# Patient Record
Sex: MALE | Race: WHITE
[De-identification: names, ages, dates, MRNs, and addresses within clinical notes are randomized per-mention and may not be internally consistent; named-entity substitution may affect disease eponyms.]

---

## 2019-12-05 ENCOUNTER — HOSPITAL ENCOUNTER (INPATIENT)
Dept: HOSPITAL 74 - JER | Age: 69
LOS: 1 days | Discharge: LEFT BEFORE DISCHARGE | DRG: 194 | End: 2019-12-06
Attending: NURSE PRACTITIONER | Admitting: INTERNAL MEDICINE
Payer: COMMERCIAL

## 2019-12-05 VITALS — BODY MASS INDEX: 18.2 KG/M2

## 2019-12-05 VITALS — TEMPERATURE: 98 F

## 2019-12-05 DIAGNOSIS — R64: ICD-10-CM

## 2019-12-05 DIAGNOSIS — A15.9: ICD-10-CM

## 2019-12-05 DIAGNOSIS — J18.9: Primary | ICD-10-CM

## 2019-12-05 DIAGNOSIS — R62.7: ICD-10-CM

## 2019-12-05 DIAGNOSIS — J44.9: ICD-10-CM

## 2019-12-05 DIAGNOSIS — E44.0: ICD-10-CM

## 2019-12-05 DIAGNOSIS — F17.210: ICD-10-CM

## 2019-12-05 LAB
ALBUMIN SERPL-MCNC: 3.1 G/DL (ref 3.4–5)
ALP SERPL-CCNC: 84 U/L (ref 45–117)
ALT SERPL-CCNC: 17 U/L (ref 13–61)
AMPHET UR-MCNC: NEGATIVE NG/ML
ANION GAP SERPL CALC-SCNC: 8 MMOL/L (ref 8–16)
APPEARANCE UR: CLEAR
AST SERPL-CCNC: 15 U/L (ref 15–37)
BACTERIA # UR AUTO: 1 /HPF
BARBITURATES UR-MCNC: NEGATIVE NG/ML
BASOPHILS # BLD: 0.9 % (ref 0–2)
BENZODIAZ UR SCN-MCNC: NEGATIVE NG/ML
BILIRUB SERPL-MCNC: 0.2 MG/DL (ref 0.2–1)
BILIRUB UR STRIP.AUTO-MCNC: NEGATIVE MG/DL
BUN SERPL-MCNC: 12.4 MG/DL (ref 7–18)
CALCIUM SERPL-MCNC: 8.5 MG/DL (ref 8.5–10.1)
CASTS URNS QL MICRO: 1 /LPF (ref 0–8)
CHLORIDE SERPL-SCNC: 103 MMOL/L (ref 98–107)
CO2 SERPL-SCNC: 25 MMOL/L (ref 21–32)
COCAINE UR-MCNC: NEGATIVE NG/ML
COLOR UR: YELLOW
CREAT SERPL-MCNC: 0.6 MG/DL (ref 0.55–1.3)
DEPRECATED RDW RBC AUTO: 13.1 % (ref 11.9–15.9)
EOSINOPHIL # BLD: 2.4 % (ref 0–4.5)
EPITH CASTS URNS QL MICRO: 0.3 /HPF
GLUCOSE SERPL-MCNC: 152 MG/DL (ref 74–106)
HCT VFR BLD CALC: 35.2 % (ref 35.4–49)
HGB BLD-MCNC: 11.9 GM/DL (ref 11.7–16.9)
KETONES UR QL STRIP: NEGATIVE
LEUKOCYTE ESTERASE UR QL STRIP.AUTO: NEGATIVE
LYMPHOCYTES # BLD: 14.6 % (ref 8–40)
MCH RBC QN AUTO: 31.8 PG (ref 25.7–33.7)
MCHC RBC AUTO-ENTMCNC: 33.9 G/DL (ref 32–35.9)
MCV RBC: 93.7 FL (ref 80–96)
METHADONE UR-MCNC: NEGATIVE NG/ML
MONOCYTES # BLD AUTO: 6.8 % (ref 3.8–10.2)
NEUTROPHILS # BLD: 75.3 % (ref 42.8–82.8)
NITRITE UR QL STRIP: NEGATIVE
OPIATES UR QL SCN: NEGATIVE NG/ML
PCP UR QL SCN: NEGATIVE NG/ML
PH UR: 5.5 [PH] (ref 5–8)
PLATELET # BLD AUTO: 253 K/MM3 (ref 134–434)
PMV BLD: 7.8 FL (ref 7.5–11.1)
POTASSIUM SERPLBLD-SCNC: 3.8 MMOL/L (ref 3.5–5.1)
PROT SERPL-MCNC: 7.3 G/DL (ref 6.4–8.2)
PROT UR QL STRIP: (no result)
PROT UR QL STRIP: NEGATIVE
RBC # BLD AUTO: 1 /HPF (ref 0–4)
RBC # BLD AUTO: 3.76 M/MM3 (ref 4–5.6)
SODIUM SERPL-SCNC: 136 MMOL/L (ref 136–145)
SP GR UR: 1.01 (ref 1.01–1.03)
UROBILINOGEN UR STRIP-MCNC: 0.2 MG/DL (ref 0.2–1)
WBC # BLD AUTO: 8.5 K/MM3 (ref 4–10)
WBC # UR AUTO: 0 /HPF (ref 0–5)

## 2019-12-05 RX ADMIN — IPRATROPIUM BROMIDE AND ALBUTEROL SULFATE SCH AMP: .5; 3 SOLUTION RESPIRATORY (INHALATION) at 16:30

## 2019-12-05 RX ADMIN — IPRATROPIUM BROMIDE AND ALBUTEROL SULFATE SCH AMP: .5; 3 SOLUTION RESPIRATORY (INHALATION) at 12:13

## 2019-12-05 RX ADMIN — IPRATROPIUM BROMIDE AND ALBUTEROL SULFATE SCH AMP: .5; 3 SOLUTION RESPIRATORY (INHALATION) at 20:28

## 2019-12-05 NOTE — PDOC
History of Present Illness





- General


Chief Complaint: Chest Pain


Stated Complaint: CHEST PAIN


Time Seen by Provider: 12/05/19 05:24





- History of Present Illness


Initial Comments: 


12/05/19 05:42


69 yo M who does not see doctors, p/w chest pain. Patient reports that he was 

at a restaurant eating this morning when he experienced L sided chest pain, 

associated with SOB and nausea w/o vomiting. Someone at the restaurant called 

the ambulance for him. Patient says that he has had this pain intermittently 

for weeks. Reports that the pain is not currently there.





Reports that he drinks approximately 3 beers daily, but "I drink more in the 

summer months". Further states "I'm not an alcoholic, I can stop anytime I want

". About 1ppd smoker since 15 years old.





States that in the ambulance, he saw "beautiful children, they were so beautiful

".





Currently complains that his back hurts and his legs are tingling.








Past History





- Past Medical History


Allergies/Adverse Reactions: 


 Allergies











Allergy/AdvReac Type Severity Reaction Status Date / Time


 


No Known Allergies Allergy   Verified 12/05/19 05:20











COPD: No


HTN: No (hypotension)





- Psycho Social/Smoking Cessation Hx


Smoking History: Unknown if ever smoked





**Review of Systems





- Review of Systems


Comments:: 


12/05/19 06:15


GENERAL/CONSTITUTIONAL: No fever or chills. No weakness.


HEAD, EYES, EARS, NOSE AND THROAT: No change in vision. No ear pain or 

discharge. No sore throat.


CARDIOVASCULAR: Chest pain w/ shortness of breath, resolved.


RESPIRATORY: Cough. No wheezing, or hemoptysis.


GASTROINTESTINAL: No nausea, vomiting, diarrhea or constipation.


GENITOURINARY: No dysuria, frequency, or change in urination.


MUSCULOSKELETAL: No joint or muscle swelling or pain. No neck or back pain.


SKIN: No rash


NEUROLOGIC: No headache, vertigo, loss of consciousness, or change in strength/

sensation.


ENDOCRINE: No increased thirst. No abnormal weight change.


HEMATOLOGIC/LYMPHATIC: No anemia, easy bleeding, or history of blood clots.


ALLERGIC/IMMUNOLOGIC: No hives or skin allergy








*Physical Exam





- Vital Signs


 Last Vital Signs











Temp Pulse Resp BP Pulse Ox


 


 98.5 F   81   94 H  101/62   99 


 


 12/05/19 05:20  12/05/19 05:20  12/05/19 05:20  12/05/19 05:20  12/05/19 05:20














- Physical Exam


12/05/19 06:14


Gen: unkempt, malodorous, undernourished, appears clinically intoxicated


Neuro: AAOX4, CN II-XII intact, FTN intact, EOMI, PERRLA, 5/5 strength, SILT


HEENT: atraumatic, normocephalic, dry mucous membranes


Neck: trachea midline, supple


CV: regular rate, regular rhythm, no murmurs, rubs, or gallops


Pulm: CTA b/l, no wheezing


Abd: soft, non-distended, non-tender


MSK: full ROM, intact pulses 


Extr: no edema, no deformities


Skin: warm, dry








ED Treatment Course





- LABORATORY


CBC & Chemistry Diagram: 


 12/05/19 05:45





 12/05/19 05:45





- RADIOLOGY


Radiology Studies Ordered: 














 Category Date Time Status


 


 CXRPORT [CHEST X-RAY PORTABLE*] [RAD] Stat Radiology  12/05/19 05:36 Ordered














Medical Decision Making





- Medical Decision Making


12/05/19 05:41


69 yo M who does not see doctors, drinks 3 beers daily, smoker, p/w CP.


- CBC, CMP


- EKG, trop


- CXR


- u tox, UA/UC


- reassess





PE low risk considering not tachycardic, not hypoxemic, no SOB.





12/05/19 06:30


EKG normal sinus at 91 bpm, left anterior fascicular block. Do not have old EKG

, but concern for ischemic change.





12/05/19 07:09


Patient signed out to Dr. SHELTON.








Discharge





- Discharge Information


Problems reviewed: Yes


Clinical Impression/Diagnosis: 


 Failure to thrive, COPD suggested by initial evaluation








- Follow up/Referral





- Patient Discharge Instructions





- Post Discharge Activity

## 2019-12-05 NOTE — PDOC
Attending Attestation





- Resident


Resident Name: Lisette Torres





- ED Attending Attestation


I have performed the following: I have examined & evaluated the patient, The 

case was reviewed & discussed with the resident, I agree w/resident's findings 

& plan





- HPI


HPI: 





12/05/19 06:19


see resident hpi





- Physicial Exam


PE: 





12/05/19 06:19


agree with resident exam





- Medical Decision Making





12/05/19 06:19


68-year-old male who does not pursue outpatient primary medical care with left-

sided chest pain after eating


Chest x-ray, EKG, labs


We will plan to admit to medical service for at minimum telemetry observation

## 2019-12-05 NOTE — CON.PULM
Consult


Consult Specialty:: PULMONARY


Referred by:: DOUGIE Laguerre


Reason for Consultation:: pneumonia





- History of Present Illness


Chief Complaint: chest pain


History of Present Illness: 





69yo male who denies past medical history who presents to the ED for abdominal 

pain per chart but tells me chest pain. Found to have a RUL infiltrate on CXR 

and CT chest. He denies any fevers, chills or sweats but reports a 60lb weight 

loss over the past 10 years. +cough with white sputum. Denies history of TB, 

states he has been tested for TB and HIV both of which were negative but unable 

to tell me when. Denies smoking. Born in [place].








- History Source


History Provided By: Patient, Medical Record


Limitations to Obtaining History: Poor Historian





- Alcohol/Substance Use


Hx Alcohol Use: Yes (denies to the writer)


Number of Drinks Daily: 3


Date of Last Use: 12/04/19





- Smoking History


Smoking history: Current every day smoker (1 ppd as per report to ED provider , 

denies to this writer)


Aproximately how many cigarettes per day: 20





- Social History


ADL: Independent


Occupation: unemployed


History of Recent Travel: No





Home Medications





- Allergies


Allergies/Adverse Reactions: 


 Allergies











Allergy/AdvReac Type Severity Reaction Status Date / Time


 


No Known Allergies Allergy   Verified 12/05/19 05:20














Review of Systems





- Review of Systems


Constitutional: reports: Unintentional Wgt. Loss.  denies: Chills, Fever


Eyes: denies: Recent Change in Vision


HENT: denies: Nasal Congestion, Throat Pain


Neck: denies: Stiffness, Tenderness


Cardiovascular: reports: Chest Pain.  denies: Palpitations, Shortness of Breath


Respiratory: reports: Cough.  denies: Hemoptysis, Wheezing


Gastrointestinal: denies: Abdominal Pain, Nausea, Vomiting


Genitourinary: denies: Dysuria, Hematuria


Neurological: denies: Dizziness, Headache


Endocrine: denies: Unexplained Weight Loss





Physical Exam


Vital Sings: 


 Vital Signs











Temperature  98.0 F   12/05/19 12:04


 


Pulse Rate  92 H  12/05/19 12:04


 


Respiratory Rate  16   12/05/19 12:04


 


Blood Pressure  104/58 L  12/05/19 12:04


 


O2 Sat by Pulse Oximetry (%)  97   12/05/19 07:15











Constitutional: Yes: Cachectic


Eyes: Yes: Conjunctiva Clear, EOM Intact


HENT: Yes: Atraumatic, Normocephalic


Neck: Yes: Supple, Trachea Midline


Cardiovascular: Yes: Regular Rate and Rhythm


Respiratory: Yes: Diminished (distant breath sounds)


...Clubbing: No


Gastrointestinal: Yes: Normal Bowel Sounds, Soft.  No: Tenderness


Edema: No


Neurological: Yes: Alert, Oriented


Labs: 


 CBC, BMP





 12/05/19 05:45 





 12/05/19 05:45 











Imaging





- Results


Chest X-ray: Report Reviewed, Image Reviewed


Cat Scan: Report Reviewed, Image Reviewed (RUL consolidation, RML infiltrates)





Assessment/Plan





Pneumonia


Failure to Thrive





-  collect sputum samples for AFB x 3


-  quantifieron


-  agree with empiric antibiotics but hold off on macrolides for now


-  may need bronchoscopy/BAL if sputum studies unrevealing


-  DVT prophylaxis





Thank you for this consult


Abraham Garcia MD

## 2019-12-05 NOTE — PDOC
*Physical Exam





- Vital Signs


 Last Vital Signs











Temp Pulse Resp BP Pulse Ox


 


 98.5 F   81   94 H  101/62   99 


 


 19 05:20  19 05:20  19 05:20  19 05:20  19 05:20














- Physical Exam


General Appearance: Yes: Nourished, Appropriately Dressed, Disheveled, Other (

strong scent of tobacco).  No: Alcohol on Breath


HEENT: positive: EOMI, DOC, Normal Voice, Symmetrical.  negative: Scleral 

Icterus (R), Scleral Icterus (L)


Neck: positive: Supple.  negative: Tender, Lymphadenopathy (R), Lymphadenopathy 

(L)


Respiratory/Chest: positive: Lungs Clear, Normal Breath Sounds.  negative: 

Chest Tender, Respiratory Distress, Accessory Muscle Use, Crackles, Rales, 

Rhonchi, Stridor, Wheezing


Cardiovascular: positive: Regular Rhythm, Regular Rate


Gastrointestinal/Abdominal: positive: Normal Bowel Sounds, Flat, Soft.  negative

: Tender, Organomegaly, Pulsatile Mass, Guarding, Rebound, Hernia


Musculoskeletal: positive: Normal Inspection.  negative: CVA Tenderness


Extremity: positive: Normal Capillary Refill, Normal Inspection, Normal Range 

of Motion, Tender (tender to LT, bilateral LE feet to knees, no evidence of 

swelling or pedal edema)


Integumentary: positive: Normal Color, Dry, Warm


Neurologic: positive: Fully Oriented (patient provides correct name, , 

location, and current President. Denies AV hallucinations, no evidence of 

improper logic or speech patterns), Alert, Normal Mood/Affect, Normal Response





ED Treatment Course





- LABORATORY


CBC & Chemistry Diagram: 


 19 05:45





 19 05:45





- ADDITIONAL ORDERS


Additional order review: 


 Laboratory  Results











  19





  06:10 06:10 05:45


 


Sodium   


 


Potassium   


 


Chloride   


 


Carbon Dioxide   


 


Anion Gap   


 


BUN   


 


Creatinine   


 


Est GFR (CKD-EPI)AfAm   


 


Est GFR (CKD-EPI)NonAf   


 


Random Glucose   


 


Calcium   


 


Total Bilirubin   


 


AST   


 


ALT   


 


Alkaline Phosphatase   


 


Creatine Kinase    113


 


Troponin I    < 0.02


 


Total Protein   


 


Albumin   


 


Urine Color  Yellow  


 


Urine Appearance  Clear  


 


Urine pH  5.5  


 


Ur Specific Gravity  1.009 L  


 


Urine Protein  Negative  


 


Urine Glucose (UA)  2+ H  


 


Urine Ketones  Negative  


 


Urine Blood  Trace  


 


Urine Nitrite  Negative  


 


Urine Bilirubin  Negative  


 


Urine Urobilinogen  0.2  


 


Ur Leukocyte Esterase  Negative  


 


Urine WBC (Auto)  0  


 


Urine RBC (Auto)  1  


 


Urine Casts (Auto)  1  


 


U Epithel Cells (Auto)  0.3  


 


Urine Bacteria (Auto)  1.0  


 


Opiates Screen   Negative 


 


Methadone Screen   Negative 


 


Barbiturate Screen   Negative 


 


Phencyclidine Screen   Negative 


 


Ur Amphetamines Screen   Negative 


 


MDMA (Ecstasy) Screen   Negative 


 


Benzodiazepines Screen   Negative 


 


Cocaine Screen   Negative 


 


U Marijuana (THC) Screen   Negative 














  19





  05:45


 


Sodium  136


 


Potassium  3.8


 


Chloride  103


 


Carbon Dioxide  25


 


Anion Gap  8


 


BUN  12.4


 


Creatinine  0.6


 


Est GFR (CKD-EPI)AfAm  118.90


 


Est GFR (CKD-EPI)NonAf  102.59


 


Random Glucose  152 H


 


Calcium  8.5


 


Total Bilirubin  0.2


 


AST  15


 


ALT  17


 


Alkaline Phosphatase  84


 


Creatine Kinase 


 


Troponin I 


 


Total Protein  7.3


 


Albumin  3.1 L


 


Urine Color 


 


Urine Appearance 


 


Urine pH 


 


Ur Specific Gravity 


 


Urine Protein 


 


Urine Glucose (UA) 


 


Urine Ketones 


 


Urine Blood 


 


Urine Nitrite 


 


Urine Bilirubin 


 


Urine Urobilinogen 


 


Ur Leukocyte Esterase 


 


Urine WBC (Auto) 


 


Urine RBC (Auto) 


 


Urine Casts (Auto) 


 


U Epithel Cells (Auto) 


 


Urine Bacteria (Auto) 


 


Opiates Screen 


 


Methadone Screen 


 


Barbiturate Screen 


 


Phencyclidine Screen 


 


Ur Amphetamines Screen 


 


MDMA (Ecstasy) Screen 


 


Benzodiazepines Screen 


 


Cocaine Screen 


 


U Marijuana (THC) Screen 








 











  19





  05:45


 


RBC  3.76 L


 


MCV  93.7


 


MCHC  33.9


 


RDW  13.1


 


MPV  7.8


 


Neutrophils %  75.3


 


Lymphocytes %  14.6


 


Monocytes %  6.8


 


Eosinophils %  2.4


 


Basophils %  0.9














Medical Decision Making





- Medical Decision Making





19 07:15


Signed out to me by Dr. Torres.





68M here with chest pain with SOB, N/V while eating at restaurant. Has had 

intermittent pain for weeks. Not currently in pain.





1ppd smoker, daily beer drinker. 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 

ambulance. Has back pain with leg tingling.





[] labs


[] UTOX


[] BAL


[] dispo





19 08:53


Patient re-assessed, appears disheveled but is fully oriented and does not want 

to answer questions about his symptoms.


Says he has chest pain and pain in both of his legs.





Labs notable for:


- BAL 40





CXR shows consolidations in upper lobes consistent with PNA vs. TB vs. mass.


No obvious symptoms for any of these other than nonproductive cough and known 

smoking history with poor prior medical follow-up, afebrile.


Ordering CT chest noncon for further evaluation, as well as CT head for 

evaluation of reported AMS with EMS.





19 10:32


Chest CT shows severe COPD with consolidations in the right upper and middle 

lobes concerning for TB vs. infiltrate.





Patient denies risk factors for TB, says he lives alone in a house on [facility] and does all chores by himself, no sick contacts, denies weight loss, 

denies incarceration or close contact with large number of other people, denies 

recent travel out of [place].





However, patient is an unreliable historian despite being A/O x4, not 

intoxicated and BAL 40. Patient refusing to answer further questioning at this 

time, but is concerned about a possible PNA when results discussed with him. 

Moderate suspicion of TB despite lack of symptoms given patient's unkempt 

appearance, unclear history, and lack of prior medical care.





CT head shows no acute pathology.


Ordering quantiferon and sputum cultures.





Plan to admit for PNA vs. TB.





19 12:13


Cased discussed with NP Nguyen Matt with admitting team, good for 

admission to Med Surg under Dr. Mackenzie.








Discharge





- Discharge Information


Problems reviewed: Yes


Clinical Impression/Diagnosis: 


 COPD suggested by initial evaluation





Failure to thrive


Qualifiers:


 Failure to thrive age range: in adult Qualified Code(s): R62.7 - Adult failure 

to thrive





Condition: Stable


Disposition: ELOPED





- Follow up/Referral





- Patient Discharge Instructions





- Post Discharge Activity

## 2019-12-05 NOTE — EKG
Test Reason : 

Blood Pressure : ***/*** mmHG

Vent. Rate : 091 BPM     Atrial Rate : 091 BPM

   P-R Int : 120 ms          QRS Dur : 100 ms

    QT Int : 352 ms       P-R-T Axes : 065 -74 063 degrees

   QTc Int : 432 ms

 

NORMAL SINUS RHYTHM

LEFT ANTERIOR FASCICULAR BLOCK

ABNORMAL ECG

NO PREVIOUS ECGS AVAILABLE

Confirmed by KURT FULLER MD ([id number]) on 12/5/2019 10:44:19 AM

 

Referred By:             Confirmed By:KURT FULLER MD

## 2019-12-05 NOTE — HP
Admitting History and Physical





- Primary Care Physician


PCP: none





- Admission


Chief Complaint: abdominal pain x 1 day since eating at [employer]


History of Present Illness: 





69 y/o male who denies any PMH or recent hospitalizations. Poor historian 

giving conflicting history to ED provider. States he ate a cheeseburger at 

[employer] yesterday and shorty after developed epigastric pain and vomiting. 

Denies hemetemesis or nausea. Denies any sick contacts, fever, chills, cough, 

malaise however report cough and recent visit to ED to the ED provider. States 

he lives alone in apartment in [place], unemployed and independent of ADLs. 

Denies any ETOH, illicit drug use or smoking however smells like cigarette smoke


History Source: Patient


Limitations to Obtaining History: Poor Historian, Uncooperative





- Smoking History


Smoking history: Current every day smoker (1 ppd as per report to ED provider , 

denies to this writer)


Aproximately how many cigarettes per day: 20





- Alcohol/Substance Use


Hx Alcohol Use: Yes (denies to the writer)


Number of Drinks Daily: 3


Date of Last Use: 12/04/19





- Social History


Usual Living Arrangement: Yes: Alone


Do you think of yourself as: Straight/Heterosexual


ADL: Independent


Occupation: unemployed


History of Recent Travel: No





Home Medications





- Allergies


Allergies/Adverse Reactions: 


 Allergies











Allergy/AdvReac Type Severity Reaction Status Date / Time


 


No Known Allergies Allergy   Verified 12/05/19 05:20














Family Medical History


Family History: Denies





Review of Systems





- Review of Systems


Constitutional: reports: No Symptoms


Eyes: reports: No Symptoms


HENT: reports: No Symptoms


Neck: reports: No Symptoms


Cardiovascular: reports: No Symptoms


Respiratory: reports: No Symptoms


Gastrointestinal: reports: Abdominal Pain


Genitourinary: reports: No Symptoms


Breasts: reports: No Symptoms Reported


Musculoskeletal: reports: No Symptoms


Integumentary: reports: No Symptoms


Neurological: reports: No Symptoms


Endocrine: reports: No Symptoms


Hematology/Lymphatic: reports: No Symptoms


Psychiatric: reports: No Symptoms





Physical Examination


Vital Signs: 


 Vital Signs











Temperature  97.7 F   12/05/19 07:15


 


Pulse Rate  85   12/05/19 07:15


 


Respiratory Rate  17   12/05/19 07:15


 


Blood Pressure  103/59 L  12/05/19 07:15


 


O2 Sat by Pulse Oximetry (%)  97   12/05/19 07:15











Constitutional: Yes: Poor Hygeine, Thin


Eyes: Yes: WNL, Conjunctiva Clear, EOM Intact


HENT: Yes: WNL, Atraumatic, Normocephalic


Neck: Yes: WNL, Supple, Trachea Midline


Cardiovascular: Yes: WNL, Regular Rate and Rhythm


Respiratory: Yes: Regular, CTA Bilaterally, Diminished (at bases)


Gastrointestinal: Yes: WNL, Normal Bowel Sounds, Soft


...Rectal Exam: Yes: Deferred


Renal/: Yes: WNL


Breast(s): Yes: WNL


Musculoskeletal: Yes: WNL


Extremities: Yes: WNL


Edema: No


Peripheral Pulses WNL: Yes


Peripheral Pulses: Left Radial: 2+, Right Radial: 2+, Left Doralis Pedis: 2+, 

Right Dorsalis Pedis: 2+, Left Femoral: 2+, Right Femoral: 2+


Integumentary: Yes: WNL


Neurological: Yes: WNL, Alert, Oriented


...Motor Strength: WNL


Psychiatric: Yes: WNL, Alert, Oriented


Labs: 


 CBC, BMP





 12/05/19 05:45 





 12/05/19 05:45 











Imaging





- Results


Cat Scan: Report Reviewed (HCt no acute pathology  Chest CT:moderately severe 

CPOD with RUL/RML consolidation)





Problem List





- Problems


(1) Abdominal pain


Assessment/Plan: 


denies any abdominal pain now


antiemetics as needed


if abdominal pain return will further investigate with CT


Code(s): R10.9 - UNSPECIFIED ABDOMINAL PAIN   





(2) Prophylactic measure


Assessment/Plan: 


FEN


regular diet


dietician consult requested


no additional IVF needed


monitor electrolytes





DVT


heparin sq





Dispo


admit to med surg


full code 


discharge planning


Code(s): Z29.9 - ENCOUNTER FOR PROPHYLACTIC MEASURES, UNSPECIFIED   





(3) PNA (pneumonia)


Assessment/Plan: 


RUL/RML consolidation


SPO2 97% on RA


start abx for questionble PNA


duo nebs prn


pulmonary consultation requested


Code(s): J18.9 - PNEUMONIA, UNSPECIFIED ORGANISM   





(4) COPD suggested by initial evaluation


Assessment/Plan: 


COPD changes seen on CT


Code(s): J44.9 - CHRONIC OBSTRUCTIVE PULMONARY DISEASE, UNSPECIFIED   





(5) Moderate protein-calorie malnutrition


Assessment/Plan: 


Clinical indicators: BMI 18, albumin 2.9, sunken cheeks with prominent bones, 

muscle wasting


add snacks to meals


start MVI


dietician consult requested





Code(s): E44.0 - MODERATE PROTEIN-CALORIE MALNUTRITION   





(6) Tuberculosis


Assessment/Plan: 


collect sputum samples for AFB x 3


quantifieron pending


maintain isolation until proven TB negative


Code(s): A15.9 - RESPIRATORY TUBERCULOSIS UNSPECIFIED   





Visit type





- Emergency Visit


Emergency Visit: Yes


ED Registration Date: 12/05/19


Care time: The patient presented to the Emergency Department on the above date 

and was hospitalized for further evaluation of their emergent condition.





- New Patient


This patient is new to me today: Yes


Date on this admission: 12/05/19





- Critical Care


Critical Care patient: No

## 2019-12-06 VITALS — DIASTOLIC BLOOD PRESSURE: 63 MMHG | SYSTOLIC BLOOD PRESSURE: 123 MMHG | HEART RATE: 92 BPM

## 2019-12-18 ENCOUNTER — HOSPITAL ENCOUNTER (INPATIENT)
Dept: HOSPITAL 74 - JER | Age: 69
LOS: 12 days | Discharge: HOME | DRG: 193 | End: 2019-12-30
Attending: FAMILY MEDICINE | Admitting: INTERNAL MEDICINE
Payer: COMMERCIAL

## 2019-12-18 VITALS — BODY MASS INDEX: 21.2 KG/M2

## 2019-12-18 DIAGNOSIS — J44.9: ICD-10-CM

## 2019-12-18 DIAGNOSIS — E43: ICD-10-CM

## 2019-12-18 DIAGNOSIS — E44.0: ICD-10-CM

## 2019-12-18 DIAGNOSIS — J18.1: Primary | ICD-10-CM

## 2019-12-18 DIAGNOSIS — E11.65: ICD-10-CM

## 2019-12-18 DIAGNOSIS — R64: ICD-10-CM

## 2019-12-18 LAB
ALBUMIN SERPL-MCNC: 3 G/DL (ref 3.4–5)
ALP SERPL-CCNC: 86 U/L (ref 45–117)
ALT SERPL-CCNC: 16 U/L (ref 13–61)
ANION GAP SERPL CALC-SCNC: 5 MMOL/L (ref 8–16)
AST SERPL-CCNC: 15 U/L (ref 15–37)
BASOPHILS # BLD: 0.8 % (ref 0–2)
BILIRUB SERPL-MCNC: 0.3 MG/DL (ref 0.2–1)
BUN SERPL-MCNC: 20.7 MG/DL (ref 7–18)
CALCIUM SERPL-MCNC: 8.7 MG/DL (ref 8.5–10.1)
CHLORIDE SERPL-SCNC: 106 MMOL/L (ref 98–107)
CO2 SERPL-SCNC: 28 MMOL/L (ref 21–32)
CREAT SERPL-MCNC: 0.9 MG/DL (ref 0.55–1.3)
DEPRECATED RDW RBC AUTO: 13.7 % (ref 11.9–15.9)
EOSINOPHIL # BLD: 3.1 % (ref 0–4.5)
GLUCOSE SERPL-MCNC: 243 MG/DL (ref 74–106)
HCT VFR BLD CALC: 37.4 % (ref 35.4–49)
HGB BLD-MCNC: 12.6 GM/DL (ref 11.7–16.9)
LYMPHOCYTES # BLD: 20 % (ref 8–40)
MCH RBC QN AUTO: 31.5 PG (ref 25.7–33.7)
MCHC RBC AUTO-ENTMCNC: 33.7 G/DL (ref 32–35.9)
MCV RBC: 93.5 FL (ref 80–96)
MONOCYTES # BLD AUTO: 10.1 % (ref 3.8–10.2)
NEUTROPHILS # BLD: 66 % (ref 42.8–82.8)
PLATELET # BLD AUTO: 257 K/MM3 (ref 134–434)
PMV BLD: 8.3 FL (ref 7.5–11.1)
POTASSIUM SERPLBLD-SCNC: 4.1 MMOL/L (ref 3.5–5.1)
PROT SERPL-MCNC: 7.4 G/DL (ref 6.4–8.2)
RBC # BLD AUTO: 3.99 M/MM3 (ref 4–5.6)
SODIUM SERPL-SCNC: 139 MMOL/L (ref 136–145)
WBC # BLD AUTO: 5.6 K/MM3 (ref 4–10)

## 2019-12-18 NOTE — PDOC
History of Present Illness





- General


Chief Complaint: Cold Symptoms


Stated Complaint: SENT BY PCP


Time Seen by Provider: 12/18/19 17:56


History Source: Patient, Old Records


Exam Limitations: No Limitations





- History of Present Illness


Initial Comments: 





12/18/19 19:44


68y M with PMH of COPD, DM, CVA w/ no residuals presenting to ED because he got 

a note from [facility]. He tried calling the 

number but did not get an answer so he called his PMD who said to go to the ER. 

He states that he may have TB. He was in the hospital 12/5/2019 but eloped 

before any testing for TB could be done. Patient is not having any problems at 

this time. He endorses weight loss over the period of a few years. Endorses 

chronic cough productive of white phlegm which has not increased, denies chest 

pain, new sob, n/v/d, fevers, chills, night sweats, hemoptysis, back pain, 

abdominal pain, headache. Denies history of HIV





PMD: Kati


PMH: see hpi


Meds: see med rec


Allergies: nkda


Social: current smoker








Past History





- Past Medical History


Allergies/Adverse Reactions: 


 Allergies











Allergy/AdvReac Type Severity Reaction Status Date / Time


 


No Known Allergies Allergy   Verified 02/12/19 08:57











Home Medications: 


Ambulatory Orders





NK [No Known Home Medication]  02/12/19 








COPD: No


HTN: No (hypotension)





- Psycho Social/Smoking Cessation Hx


Smoking History: Never smoked


Number of Cigarettes Smoked Daily: 20


Information on smoking cessation initiated: No


Hx Alcohol Use: No


Drug/Substance Use Hx: No





**Review of Systems





- Review of Systems


Constitutional: No: Chills, Fever, Night Sweats


HEENTM: No: Symptoms Reported


Respiratory: Yes: Productive cough.  No: Hemoptysis


Cardiac (ROS): No: Symptoms Reported


ABD/GI: No: Symptoms Reported


: No: Symptoms Reported


Musculoskeletal: No: Symptoms Reported


Integumentary: No: Symptoms Reported


Neurological: No: Symptoms reported





*Physical Exam





- Vital Signs


 Last Vital Signs











Temp Pulse Resp BP Pulse Ox


 


 98.5 F   96 H  18   103/58 L  98 


 


 12/18/19 18:04  12/18/19 18:04  12/18/19 18:04  12/18/19 18:04  12/18/19 18:04














- Physical Exam


General Appearance: Yes: Appropriately Dressed, Thin.  No: Apparent Distress


HEENT: positive: EOMI, DOC, Normal ENT Inspection.  negative: Tonsillar Exudate

, Lesions, Thrush


Neck: positive: Trachea midline, Supple.  negative: Lymphadenopathy (R), 

Lymphadenopathy (L)


Respiratory/Chest: positive: Lungs Clear.  negative: Respiratory Distress, 

Accessory Muscle Use, Crackles, Rales, Rhonchi, Stridor, Plerual Rub


Cardiovascular: positive: Regular Rhythm, Regular Rate, S1, S2.  negative: Edema

, JVD, Murmur


Gastrointestinal/Abdominal: positive: Normal Bowel Sounds, Soft.  negative: 

Tender


Musculoskeletal: negative: CVA Tenderness


Extremity: positive: Normal Capillary Refill.  negative: Swelling, Calf 

Tenderness, Erythema


Integumentary: positive: Normal Color, Dry, Warm.  negative: Petechiae, Rash


Neurologic: positive: CNs II-XII NML intact, Fully Oriented, Alert, Normal Mood/

Affect, Normal Response, Motor Strength 5/5





ED Treatment Course





- LABORATORY


CBC & Chemistry Diagram: 


 12/18/19 21:00





 12/18/19 22:21





Medical Decision Making





- Medical Decision Making





12/19/19 05:16


68y M presenting to ED for possible TB. 


vitals wnl





patient was in the hospital a few days ago however left prior to full workup. 

TB cannot be excluded. 





will admit for TB rule out. 


basic labs sent, cxr, ekg. 


cxr has not changed from prior showing RUL consolidation. 





ekg: nsr at 87bpm. no david or depressions. slighly widened qrs at 102. lAFB 





labs wnl. 





admit med/surg





Discharge





- Discharge Information


Problems reviewed: Yes


Clinical Impression/Diagnosis: 


 Evaluation by medical service required, Right upper lobe consolidation





Condition: Good





- Admission


Yes





- Follow up/Referral





- Patient Discharge Instructions





- Post Discharge Activity

## 2019-12-18 NOTE — PDOC
Attending Attestation





- Resident


Resident Name: JoselinMary





- ED Attending Attestation


I have performed the following: I have examined & evaluated the patient, The 

case was reviewed & discussed with the resident, I agree w/resident's findings 

& plan





- HPI


HPI: 





12/18/19 19:52


see resident hpi





- Physicial Exam


PE: 





12/18/19 19:53


agree with resident exam





- Medical Decision Making





68-year-old male noncompliant with last visit to rule out TB sent back by the 

[facility] for further evaluation


Plan for readmission with AFB x3 as previously recommended by pulmonary 

consultation


Patient has no complaints at this time

## 2019-12-18 NOTE — PDOC
Rapid Medical Evaluation


Chief Complaint: Cold Symptoms


Time Seen by Provider: 12/18/19 17:56


Medical Evaluation: 


 Allergies











Allergy/AdvReac Type Severity Reaction Status Date / Time


 


No Known Allergies Allergy   Verified 02/12/19 08:57








Vital Signs











Temp Pulse Resp BP Pulse Ox


 


 98.5 F   96 H  18   103/58 L  98 


 


 12/18/19 18:04  12/18/19 18:04  12/18/19 18:04  12/18/19 18:04  12/18/19 18:04








12/18/19 18:12


I have performed a brief in-person evaluation of this patient.





The patient presents with a chief complaint of:h/o COPD, DM, CVA w/ no residuals

, s/p admission for CP 12/5/19 and tx empirically for PNA for RUL infiltrate. 

Seen by pulm who r/o TB w/ sputum culture (unclear from chart if all 3 AFB 

smears were negative), no discharge summary on records. Pt here after receiving 

letter for [facility] and states he spoke to staff who told him to come to ER but pt 

does not know the reason. No acute med complaints at this time





Pertinent physical exam findings:stable





I have ordered the following:nothing





The patient will proceed to the ED for further evaluation.








**Discharge Disposition





- Diagnosis


 Evaluation by medical service required








- Discharge Dispostion


Last Admission D/C Date: 12/06/19





- Referrals





- Patient Instructions





- Post Discharge Activity

## 2019-12-19 LAB
ANION GAP SERPL CALC-SCNC: 5 MMOL/L (ref 8–16)
BASOPHILS # BLD: 0.8 % (ref 0–2)
BUN SERPL-MCNC: 22.2 MG/DL (ref 7–18)
CALCIUM SERPL-MCNC: 9.1 MG/DL (ref 8.5–10.1)
CHLORIDE SERPL-SCNC: 106 MMOL/L (ref 98–107)
CO2 SERPL-SCNC: 28 MMOL/L (ref 21–32)
CREAT SERPL-MCNC: 0.8 MG/DL (ref 0.55–1.3)
DEPRECATED RDW RBC AUTO: 13.9 % (ref 11.9–15.9)
EOSINOPHIL # BLD: 4.1 % (ref 0–4.5)
GLUCOSE SERPL-MCNC: 173 MG/DL (ref 74–106)
HCT VFR BLD CALC: 39.4 % (ref 35.4–49)
HGB BLD-MCNC: 13 GM/DL (ref 11.7–16.9)
LYMPHOCYTES # BLD: 23.9 % (ref 8–40)
MAGNESIUM SERPL-MCNC: 2 MG/DL (ref 1.8–2.4)
MCH RBC QN AUTO: 31.6 PG (ref 25.7–33.7)
MCHC RBC AUTO-ENTMCNC: 33 G/DL (ref 32–35.9)
MCV RBC: 95.9 FL (ref 80–96)
MONOCYTES # BLD AUTO: 11.4 % (ref 3.8–10.2)
NEUTROPHILS # BLD: 59.8 % (ref 42.8–82.8)
PHOSPHATE SERPL-MCNC: 4.1 MG/DL (ref 2.5–4.9)
PLATELET # BLD AUTO: 248 K/MM3 (ref 134–434)
PMV BLD: 8.4 FL (ref 7.5–11.1)
POTASSIUM SERPLBLD-SCNC: 4.5 MMOL/L (ref 3.5–5.1)
RBC # BLD AUTO: 4.1 M/MM3 (ref 4–5.6)
SODIUM SERPL-SCNC: 139 MMOL/L (ref 136–145)
WBC # BLD AUTO: 6.5 K/MM3 (ref 4–10)

## 2019-12-19 RX ADMIN — ACETAMINOPHEN PRN MG: 325 TABLET ORAL at 23:10

## 2019-12-19 RX ADMIN — INSULIN ASPART SCH: 100 INJECTION, SOLUTION INTRAVENOUS; SUBCUTANEOUS at 16:57

## 2019-12-19 RX ADMIN — INSULIN ASPART SCH UNITS: 100 INJECTION, SOLUTION INTRAVENOUS; SUBCUTANEOUS at 11:56

## 2019-12-19 RX ADMIN — INSULIN ASPART SCH: 100 INJECTION, SOLUTION INTRAVENOUS; SUBCUTANEOUS at 11:56

## 2019-12-19 RX ADMIN — INSULIN ASPART SCH: 100 INJECTION, SOLUTION INTRAVENOUS; SUBCUTANEOUS at 23:10

## 2019-12-19 RX ADMIN — HEPARIN SODIUM SCH: 5000 INJECTION, SOLUTION INTRAVENOUS; SUBCUTANEOUS at 11:02

## 2019-12-19 RX ADMIN — HEPARIN SODIUM SCH UNIT: 5000 INJECTION, SOLUTION INTRAVENOUS; SUBCUTANEOUS at 23:09

## 2019-12-19 NOTE — HP
CHIEF COMPLAINT: sent by PCP





PCP: Kati





HISTORY OF PRESENT ILLNESS:


This is a 68yM with PMH COPD, DM, CVA who presented to the ED at the urging of 

his PCP. He received a notice from [facility] and 

states he may have TB. He was recently in the hospital on  but eloped 

before workup could be completed. He had a CT chest which revealed RUL and RML 

consolidations. He denies any acute complaints but did report feeling feverish 

with chills. Denies night sweats. Reported weight loss over a few years to ED 

providers. He states that he has a chronic cough productive of white phlegm. 

Denies chest pain, SOB, hemoptysis. 








Recent Travel:


pt denies





PAST MEDICAL HISTORY:


COPD, DM, CVA





PAST SURGICAL HISTORY:


appendectomy


rods in right leg





Social History:


Smokin/2 PPD; pt states he doesn't "inhale"


Alcohol: frequent, states last drink was 3 days ago, denies h/o ETOH withdrawal 

or tremors


Drugs: pt denies





Allergies


No Known Allergies Allergy (Verified 19 08:57)


 


HOME MEDICATIONS:


pt states he does not take any medications; he stopped them





REVIEW OF SYSTEMS


CONSTITUTIONAL: Present: fever, chills


Absent:  diaphoresis, generalized weakness, malaise, loss of appetite


HEENT: 


Absent:  rhinorrhea, nasal congestion, throat pain, throat swelling, difficulty 

swallowing, mouth swelling, ear pain, eye pain, visual changes


CARDIOVASCULAR: 


Absent: chest pain, syncope, palpitations, irregular heart rate, lightheadedness

, peripheral edema


RESPIRATORY: Present: cough 


Absent: shortness of breath, dyspnea with exertion, orthopnea, wheezing, stridor

, hemoptysis


GASTROINTESTINAL:


Absent: abdominal pain, abdominal distension, nausea, vomiting, diarrhea, 

constipation, melena, hematochezia


GENITOURINARY: 


Absent: dysuria, frequency, urgency, hesitancy, hematuria, flank pain, genital 

pain


MUSCULOSKELETAL: 


Absent: myalgia, arthralgia, joint swelling, back pain, neck pain


SKIN: 


Absent: rash, itching, pallor


HEMATOLOGIC/IMMUNOLOGIC: 


Absent: easy bleeding, easy bruising, lymphadenopathy, frequent infections


ENDOCRINE:


Absent: unexplained weight gain, unexplained weight loss, heat intolerance, 

cold intolerance


NEUROLOGIC: 


Absent: headache, focal weakness or paresthesias, dizziness, unsteady gait, 

seizure, mental status changes, bladder or bowel incontinence


PSYCHIATRIC: 


Absent: anxiety, depression, suicidal or homicidal ideation, hallucinations.








PHYSICAL EXAMINATION


Vital Signs - 24 hr





 3





  19





  18:04


 


Temperature 98.5 F


 


Pulse Rate 96 H


 


Respiratory 18





Rate 


 


Blood Pressure 103/58 L


 


O2 Sat by Pulse 98





Oximetry (%) 








GENERAL: Awake, alert, and fully oriented, in no acute distress.


HEAD: Normal with no signs of trauma.


EYES: Pupils equal, round and reactive to light, extraocular movements intact, 

sclera anicteric, conjunctiva clear. No lid lag.


EARS, NOSE, THROAT: Ears normal, nares patent, oropharynx clear without 

exudates. Moist mucous membranes.


NECK: Normal range of motion, supple without lymphadenopathy, JVD, or masses.


LUNGS: Breath sounds equal, clear to auscultation bilaterally. No wheezes, and 

no crackles. No accessory muscle use.


HEART: Regular rate and rhythm, normal S1 and S2 without murmur, rub or gallop.


ABDOMEN: Soft, nontender, not distended, normoactive bowel sounds, no guarding, 

no rebound, no masses.  No hepatomegaly or  splenomegaly. 


MUSCULOSKELETAL: Normal range of motion at all joints. No bony deformities or 

tenderness. No CVA tenderness.


UPPER EXTREMITIES: 2+ pulses, warm, well-perfused. No cyanosis. No clubbing. No 

peripheral edema.


LOWER EXTREMITIES: 2+ pulses, warm, well-perfused. No calf tenderness. No 

peripheral edema. 


NEUROLOGICAL:  Cranial nerves II-XII intact. Normal speech. Normal gait.


PSYCHIATRIC: Cooperative. Good eye contact. Appropriate mood and affect.


SKIN: Warm, dry, normal turgor, no rashes or lesions noted, normal capillary 

refill. 





Laboratory Results - last 24 hr





 3





  19





  21:00 21:00 21:00


 


WBC   5.6 


 


RBC   3.99 L 


 


Hgb   12.6 


 


Hct   37.4 


 


MCV   93.5 


 


MCH   31.5 


 


MCHC   33.7 


 


RDW   13.7 


 


Plt Count   257 


 


MPV   8.3 


 


Absolute Neuts (auto)   3.7 


 


Neutrophils %   66.0 


 


Lymphocytes %   20.0  D 


 


Monocytes %   10.1 


 


Eosinophils %   3.1 


 


Basophils %   0.8 


 


Nucleated RBC %   0 


 


Sodium    Cancelled


 


Potassium    Cancelled


 


Chloride    Cancelled


 


Carbon Dioxide    Cancelled


 


Anion Gap    Cancelled


 


BUN    Cancelled


 


Creatinine    Cancelled


 


Est GFR (CKD-EPI)AfAm    Cancelled


 


Est GFR (CKD-EPI)NonAf    Cancelled


 


Random Glucose    Cancelled


 


Calcium    Cancelled


 


Total Bilirubin    Cancelled


 


AST    Cancelled


 


ALT    Cancelled


 


Alkaline Phosphatase    Cancelled


 


Troponin I  < 0.02  


 


Total Protein    Cancelled


 


Albumin    Cancelled








 3





  19





  22:21


 


WBC 


 


RBC 


 


Hgb 


 


Hct 


 


MCV 


 


MCH 


 


MCHC 


 


RDW 


 


Plt Count 


 


MPV 


 


Absolute Neuts (auto) 


 


Neutrophils % 


 


Lymphocytes % 


 


Monocytes % 


 


Eosinophils % 


 


Basophils % 


 


Nucleated RBC % 


 


Sodium  139


 


Potassium  4.1


 


Chloride  106


 


Carbon Dioxide  28


 


Anion Gap  5 L


 


BUN  20.7 H


 


Creatinine  0.9


 


Est GFR (CKD-EPI)AfAm  100.65


 


Est GFR (CKD-EPI)NonAf  86.84


 


Random Glucose  243 H


 


Calcium  8.7


 


Total Bilirubin  0.3


 


AST  15


 


ALT  16


 


Alkaline Phosphatase  86


 


Troponin I 


 


Total Protein  7.4


 


Albumin  3.0 L











ASSESSMENT/PLAN:


68yM with PMH COPD, DM, CVA presented to the ED for TB workup.





RUL/RML consolidation r/o TB


- sputum for AFB sent, sent 2 more


- quantiferon gold ordered


- respiratory isolation


- f/u with [facility] in am





COPD


- chronic cough but otherwise asymptomatic


- albuterol nebs prn





DM


- BGM AC/HS with novolog sliding scale


- initiate levemir if sugars persistently elevated





DVT PPX


- heparin SC





FEN


- tolerating po fluids


- BMP in am


- regular diet as tolerated





Dispo: pt currently requires inpatient management of his emergent condition.








Family Medical History


Family Hx Diabetes: Mother


Family Hx Gastrointestinal Disorder: Father (Liver, ETOH)





Visit type





- Emergency Visit


Emergency Visit: Yes


ED Registration Date: 19


Care time: The patient presented to the Emergency Department on the above date 

and was hospitalized for further evaluation of their emergent condition.





- New Patient


This patient is new to me today: Yes


Date on this admission: 19





- Critical Care


Critical Care patient: No

## 2019-12-19 NOTE — PN
Progress Note, Physician


Chief Complaint: 





R consolidation, R/O TB


COPD


History of Present Illness: 





Previous notes and events reviewed


awake and alert


NAD


complain of productive cough with white phlegm


no leukocytosis


afebrile


pending AFB results





- Current Medication List


Current Medications: 


Active Medications





Heparin Sodium (Porcine) (Heparin -)  5,000 unit SQ BID ROLY


   Last Admin: 12/19/19 11:02 Dose:  Not Given


Insulin Aspart (Novolog Vial Sliding Scale -)  1 vial SQ TIDAC ROLY; Protocol


Insulin Aspart (Novolog Vial Sliding Scale -)  1 vial SQ HS ROLY; Protocol











- Objective


Vital Signs: 


 Vital Signs











Temperature  98.0 F   12/19/19 11:29


 


Pulse Rate  73   12/19/19 11:29


 


Respiratory Rate  17   12/19/19 11:29


 


Blood Pressure  110/68   12/19/19 11:29


 


O2 Sat by Pulse Oximetry (%)  97   12/19/19 11:29











Constitutional: Yes: No Distress, Calm, Poor Hygeine


Eyes: Yes: Conjunctiva Clear


HENT: Yes: Atraumatic


Cardiovascular: Yes: Regular Rate and Rhythm


Respiratory: Yes: Regular, Cough, Diminished


Gastrointestinal: Yes: Normal Bowel Sounds, Soft


Musculoskeletal: Yes: Muscle Weakness


Extremities: Yes: WNL


Edema: No


Neurological: Yes: Alert, Oriented


Psychiatric: Yes: Alert, Oriented


Labs: 


 CBC, BMP





 12/19/19 06:40 





 12/19/19 06:40 





 Microbiology





12/19/19 00:50   Sputum - Expectorated   AFB Smear Concentration - Preliminary


12/19/19 00:50   Sputum - Expectorated   Mycobacterial Culture - Preliminary











- ....Imaging


Chest X-ray: Report Reviewed





Problem List





- Problems


(1) Right upper lobe consolidation


Assessment/Plan: 


-Pulm consult


-no leukocytosis


-afebrile


-O2 via NC


-keep SpO2 >90%


-CXR shows extensive COPD


-Bronchodilators


Code(s): J18.1 - LOBAR PNEUMONIA, UNSPECIFIED ORGANISM   





(2) COPD suggested by initial evaluation


Assessment/Plan: 


-Pulm consult


-no leukocytosis


-afebrile


-O2 via NC


-keep SpO2 >90%


-CXR shows extensive COPD


-Bronchodilators


Code(s): J44.9 - CHRONIC OBSTRUCTIVE PULMONARY DISEASE, UNSPECIFIED   





(3) Moderate protein-calorie malnutrition


Assessment/Plan: 


-dietary consult


Code(s): E44.0 - MODERATE PROTEIN-CALORIE MALNUTRITION   





(4) Poorly controlled diabetes mellitus


Assessment/Plan: 


-ISS


-HgA1c


-BGM ACHS


Code(s): E11.65 - TYPE 2 DIABETES MELLITUS WITH HYPERGLYCEMIA   





(5) Tuberculosis


Assessment/Plan: 


-Pulm consult


-no leukocytosis


-afebrile


-O2 via NC


-keep SpO2 >90%


-CXR shows extensive COPD


-Bronchodilators


-AFB pending result


-quantiferon pending


Code(s): A15.9 - RESPIRATORY TUBERCULOSIS UNSPECIFIED   





Assessment/Plan





see problem list


dvt ppx

## 2019-12-19 NOTE — EKG
Test Reason : 

Blood Pressure : ***/*** mmHG

Vent. Rate : 088 BPM     Atrial Rate : 088 BPM

   P-R Int : 116 ms          QRS Dur : 094 ms

    QT Int : 352 ms       P-R-T Axes : 063 -65 059 degrees

   QTc Int : 425 ms

 

NORMAL SINUS RHYTHM

LEFT ANTERIOR FASCICULAR BLOCK

ABNORMAL ECG

WHEN COMPARED WITH ECG OF 12-FEB-2019 10:23,

NO SIGNIFICANT CHANGE WAS FOUND

Confirmed by JONNY ROUSE, KURT ([id number]) on 12/19/2019 11:55:43 AM

 

Referred By:             Confirmed By:KURT FULLER MD

## 2019-12-20 LAB
ALBUMIN SERPL-MCNC: 3 G/DL (ref 3.4–5)
ALP SERPL-CCNC: 74 U/L (ref 45–117)
ALT SERPL-CCNC: 14 U/L (ref 13–61)
ANION GAP SERPL CALC-SCNC: 5 MMOL/L (ref 8–16)
AST SERPL-CCNC: 14 U/L (ref 15–37)
BILIRUB SERPL-MCNC: 0.5 MG/DL (ref 0.2–1)
BUN SERPL-MCNC: 19.6 MG/DL (ref 7–18)
CALCIUM SERPL-MCNC: 8.6 MG/DL (ref 8.5–10.1)
CHLORIDE SERPL-SCNC: 100 MMOL/L (ref 98–107)
CO2 SERPL-SCNC: 27 MMOL/L (ref 21–32)
CREAT SERPL-MCNC: 0.6 MG/DL (ref 0.55–1.3)
DEPRECATED RDW RBC AUTO: 13.1 % (ref 11.9–15.9)
GLUCOSE SERPL-MCNC: 277 MG/DL (ref 74–106)
HCT VFR BLD CALC: 36.6 % (ref 35.4–49)
HGB BLD-MCNC: 12.2 GM/DL (ref 11.7–16.9)
MCH RBC QN AUTO: 31.3 PG (ref 25.7–33.7)
MCHC RBC AUTO-ENTMCNC: 33.2 G/DL (ref 32–35.9)
MCV RBC: 94.2 FL (ref 80–96)
PLATELET # BLD AUTO: 234 K/MM3 (ref 134–434)
PMV BLD: 8.4 FL (ref 7.5–11.1)
POTASSIUM SERPLBLD-SCNC: 4.2 MMOL/L (ref 3.5–5.1)
PROT SERPL-MCNC: 7.3 G/DL (ref 6.4–8.2)
RBC # BLD AUTO: 3.89 M/MM3 (ref 4–5.6)
SODIUM SERPL-SCNC: 133 MMOL/L (ref 136–145)
WBC # BLD AUTO: 4.7 K/MM3 (ref 4–10)

## 2019-12-20 RX ADMIN — INSULIN ASPART SCH UNITS: 100 INJECTION, SOLUTION INTRAVENOUS; SUBCUTANEOUS at 11:46

## 2019-12-20 RX ADMIN — INSULIN ASPART SCH: 100 INJECTION, SOLUTION INTRAVENOUS; SUBCUTANEOUS at 21:24

## 2019-12-20 RX ADMIN — HEPARIN SODIUM SCH UNIT: 5000 INJECTION, SOLUTION INTRAVENOUS; SUBCUTANEOUS at 21:25

## 2019-12-20 RX ADMIN — HEPARIN SODIUM SCH UNIT: 5000 INJECTION, SOLUTION INTRAVENOUS; SUBCUTANEOUS at 09:08

## 2019-12-20 RX ADMIN — INSULIN ASPART SCH UNITS: 100 INJECTION, SOLUTION INTRAVENOUS; SUBCUTANEOUS at 06:49

## 2019-12-20 RX ADMIN — INSULIN ASPART SCH UNITS: 100 INJECTION, SOLUTION INTRAVENOUS; SUBCUTANEOUS at 17:00

## 2019-12-20 NOTE — CON.PULM
Consult


Consult Specialty:: PULM/CCM 


Referred by:: YUAN


Reason for Consultation:: R/O TB





- History of Present Illness


Chief Complaint: Possible TB


History of Present Illness: 


68 M, who denies past medical history.  Recently hospitalized here at [facility] (12/5

) but eloped before his work up could be completed.  He was being worked up for 

a RUL consolidation noted on CXR/CT.  





He apparently received a notice from [facility] 

and states he may have TB. 





At that time, I do not see any AFB samples.  





He denies any fevers, chills or sweats.  He does report a 60lb weight loss over 

the past 10 years.





He has a productive cough with of white/clear sputum.





He denies previous history of TB.  He does recall previously being tested for 

TB and HIV: apparently both were negative (unable to recall how long ago). 





No hemoptysis or night sweats. 





No recent travel history or sick contacts.  He is originally from [place]. 




















- History Source


History Provided By: Patient, Medical Record


Limitations to Obtaining History: Poor Historian





- Past Medical History


Pulmonary: Yes: Bronchitis, Pneumonia.  No: Asthma, COPD, O2 Dependent, 

Previously Intubated, Pulmonary Embolus, Pulmonary Fibrosis, Sleep Apnea





- Alcohol/Substance Use


Hx Alcohol Use: Yes


Number of Drinks Daily: 3


Date of Last Use: 12/04/19





- Smoking History


Smoking history: Current every day smoker


Have you smoked in the past 12 months: Yes


Aproximately how many cigarettes per day: 20





- Social History


ADL: Independent


Occupation: unemployed


History of Recent Travel: No





Home Medications





- Allergies


Allergies/Adverse Reactions: 


 Allergies











Allergy/AdvReac Type Severity Reaction Status Date / Time


 


No Known Allergies Allergy   Verified 02/12/19 08:57














- Home Medications


Home Medications: 


Ambulatory Orders





NK [No Known Home Medication]  02/12/19 











Review of Systems





- Review of Systems


Constitutional: reports: Malaise, Unintentional Wgt. Loss.  denies: Chills, 

Fever, Night Sweats


Eyes: reports: No Symptoms


HENT: reports: No Symptoms


Neck: reports: No Symptoms


Cardiovascular: reports: Shortness of Breath.  denies: Chest Pain, Edema, 

Palpitations


Respiratory: reports: Cough, SOB, SOB on Exertion.  denies: Hemoptysis, 

Orthopnea, PND, Snoring, Wheezing


Gastrointestinal: reports: No Symptoms


Genitourinary: reports: No Symptoms


Breasts: reports: No Symptoms Reported


Musculoskeletal: reports: Back Pain, Joint Pain


Integumentary: reports: No Symptoms


Neurological: reports: No Symptoms


Endocrine: reports: No Symptoms


Hematology/Lymphatic: reports: No Symptoms


Psychiatric: reports: No Symptoms





Physical Exam


Vital Sings: 


 Vital Signs











Temperature  97.8 F   12/20/19 06:00


 


Pulse Rate  84   12/20/19 06:00


 


Respiratory Rate  18   12/20/19 06:00


 


Blood Pressure  106/70   12/20/19 06:00


 


O2 Sat by Pulse Oximetry (%)  96   12/20/19 09:00











Constitutional: Yes: No Distress, Thin


Eyes: Yes: Conjunctiva Clear, EOM Intact


HENT: Yes: Atraumatic, Normocephalic


Neck: Yes: Supple, Trachea Midline


Cardiovascular: Yes: Regular Rate and Rhythm


Respiratory: Yes: Cough, Diminished, Rhonchi.  No: Accessory Muscle Use, Rales, 

SOB, SOB on Exertion, Stridor, Tachypnea, Wheezes


...Inspection: Yes: WNL


...Clubbing: No


Gastrointestinal: Yes: Normal Bowel Sounds, Soft


Renal/: Yes: WNL


Musculoskeletal: Yes: WNL


Extremities: Yes: WNL


Edema: No


Peripheral Pulses WNL: Yes


Integumentary: Yes: WNL


Neurological: Yes: WNL, Alert, Oriented


...Motor Strength: WNL


Psychiatric: Yes: WNL, Alert, Oriented


Labs: 


 CBC, BMP





 12/20/19 05:25 





 12/20/19 05:25 











Imaging





- Results


Chest X-ray: Report Reviewed, Image Reviewed


Cat Scan: Report Reviewed, Image Reviewed





Problem List





- Problems


(1) Smoker


Code(s): F17.200 - NICOTINE DEPENDENCE, UNSPECIFIED, UNCOMPLICATED   





(2) Right upper lobe consolidation


Code(s): J18.1 - LOBAR PNEUMONIA, UNSPECIFIED ORGANISM   





(3) COPD suggested by initial evaluation


Code(s): J44.9 - CHRONIC OBSTRUCTIVE PULMONARY DISEASE, UNSPECIFIED   





(4) Moderate protein-calorie malnutrition


Code(s): E44.0 - MODERATE PROTEIN-CALORIE MALNUTRITION   





(5) Musculoskeletal pain


Code(s): M79.18 - MYALGIA, OTHER SITE   





(6) PNA (pneumonia)


Code(s): J18.9 - PNEUMONIA, UNSPECIFIED ORGANISM   





(7) Tuberculosis


Code(s): A15.9 - RESPIRATORY TUBERCULOSIS UNSPECIFIED   





Assessment/Plan


Isolation 


AFB x 3 


O2 as needed


Smoking cessation was discussed 


VTE prophylaxis 


Will need outpatient PFTs 


Would monitor off ABX for now 


ID evaluation pending 


Will follow 





Thank you. 





Dr Phipps

## 2019-12-20 NOTE — PN
Progress Note, Physician


Chief Complaint: 





EVENTS AND NOTES REVIEWED


HERE FOR TB WORKUP


NO FEVER OR NIGHT SWEATS





- Current Medication List


Current Medications: 


Active Medications





Acetaminophen (Tylenol -)  650 mg PO Q4H PRN


   PRN Reason: PAIN LEVEL 1-5


   Last Admin: 12/19/19 23:10 Dose:  650 mg


Albuterol Sulfate (Ventolin 0.083% Nebulizer Soln -)  1 amp NEB Q6H PRN


   PRN Reason: SHORT OF BREATH/WHEEZING


Heparin Sodium (Porcine) (Heparin -)  5,000 unit SQ BID ROLY


   Last Admin: 12/20/19 09:08 Dose:  5,000 unit


Insulin Aspart (Novolog Vial Sliding Scale -)  1 vial SQ TIDAC ROLY; Protocol


   Last Admin: 12/20/19 06:49 Dose:  3 units


Insulin Aspart (Novolog Vial Sliding Scale -)  1 vial SQ HS ROLY; Protocol


   Last Admin: 12/19/19 23:10 Dose:  Not Given











- Objective


Vital Signs: 


 Vital Signs











Temperature  97.8 F   12/20/19 06:00


 


Pulse Rate  84   12/20/19 06:00


 


Respiratory Rate  18   12/20/19 06:00


 


Blood Pressure  106/70   12/20/19 06:00


 


O2 Sat by Pulse Oximetry (%)  96   12/20/19 09:00











Constitutional: Yes: Mild Distress


Cardiovascular: Yes: Regular Rate and Rhythm


Respiratory: Yes: CTA Bilaterally


Genitourinary: Yes: WNL


Musculoskeletal: Yes: WNL


Peripheral Pulses WNL: Yes


Wound/Incision: Yes: Other


Neurological: Yes: Other


Labs: 


 CBC, BMP





 12/20/19 05:25 





 12/20/19 05:25 











Problem List





- Problems


(1) Right upper lobe consolidation


Code(s): J18.1 - LOBAR PNEUMONIA, UNSPECIFIED ORGANISM   





(2) Smoker


Code(s): F17.200 - NICOTINE DEPENDENCE, UNSPECIFIED, UNCOMPLICATED   





(3) COPD suggested by initial evaluation


Code(s): J44.9 - CHRONIC OBSTRUCTIVE PULMONARY DISEASE, UNSPECIFIED   





Assessment/Plan





TB WORKUP ON ISOLATION


PULM AND ID WORKUP


NEBS


DVT PROPHYLAXIS


SSI/BGM CHECKS

## 2019-12-21 RX ADMIN — INSULIN ASPART SCH UNITS: 100 INJECTION, SOLUTION INTRAVENOUS; SUBCUTANEOUS at 16:26

## 2019-12-21 RX ADMIN — INSULIN ASPART SCH UNITS: 100 INJECTION, SOLUTION INTRAVENOUS; SUBCUTANEOUS at 06:09

## 2019-12-21 RX ADMIN — HEPARIN SODIUM SCH UNIT: 5000 INJECTION, SOLUTION INTRAVENOUS; SUBCUTANEOUS at 22:03

## 2019-12-21 RX ADMIN — HEPARIN SODIUM SCH UNIT: 5000 INJECTION, SOLUTION INTRAVENOUS; SUBCUTANEOUS at 09:07

## 2019-12-21 RX ADMIN — INSULIN ASPART SCH: 100 INJECTION, SOLUTION INTRAVENOUS; SUBCUTANEOUS at 11:14

## 2019-12-21 RX ADMIN — INSULIN ASPART SCH: 100 INJECTION, SOLUTION INTRAVENOUS; SUBCUTANEOUS at 21:16

## 2019-12-21 RX ADMIN — ACETAMINOPHEN PRN MG: 325 TABLET ORAL at 22:03

## 2019-12-21 RX ADMIN — ACETAMINOPHEN PRN MG: 325 TABLET ORAL at 04:30

## 2019-12-21 NOTE — PN
Progress Note (short form)





- Note


Progress Note: 





PULMONARY





Denies shortness of breath. +cough with clear sputum.





 Vital Signs











 Period  Temp  Pulse  Resp  BP Sys/Michael  Pulse Ox


 


 Last 24 Hr  98.1 F-98.3 F  84-94  18-18  /54-67  








Gen: NAD at rest


Heart: RRR


Lung: decreased breath sounds at the bases


Abd: soft, nontender


Ext: no edema





 CBC, BMP





 12/20/19 05:25 





 12/20/19 05:25 





Active Medications





Acetaminophen (Tylenol -)  650 mg PO Q4H PRN


   PRN Reason: PAIN LEVEL 1-5


   Last Admin: 12/21/19 04:30 Dose:  650 mg


Albuterol Sulfate (Ventolin 0.083% Nebulizer Soln -)  1 amp NEB Q6H PRN


   PRN Reason: SHORT OF BREATH/WHEEZING


Heparin Sodium (Porcine) (Heparin -)  5,000 unit SQ BID ROLY


   Last Admin: 12/21/19 09:07 Dose:  5,000 unit


Insulin Aspart (Novolog Vial Sliding Scale -)  1 vial SQ TIDAC ROLY; Protocol


   Last Admin: 12/21/19 11:14 Dose:  Not Given


Insulin Aspart (Novolog Vial Sliding Scale -)  1 vial SQ HS ROLY; Protocol


   Last Admin: 12/20/19 21:24 Dose:  Not Given





A/P


Pulmonary Consolidation


r/o TB


COPD


DM


h/o CVA





-  sputum AFB x 3


-  quantiferon


-  inhaled bronchodilators as needed


-  DVT prophylaxis

## 2019-12-21 NOTE — PN
Progress Note (short form)





- Note


Progress Note: 


ID CONSULT DICTATED


 RUL PNEUMONIA


 R/O AFB DISEASE


 ISOLATION


QUANTIFERON


SPUTUM AFB X 3


SPUTUM TB PCR


HIV TEST  ( PT CONSENTS )


OBSERVE OFF ANTIBIOTICS

## 2019-12-21 NOTE — PN
Progress Note, Physician


Chief Complaint: 








R consolidation, R/O TB


COPD


History of Present Illness: 





NAD


Denies any SOB





- Current Medication List


Current Medications: 


Active Medications





Acetaminophen (Tylenol -)  650 mg PO Q4H PRN


   PRN Reason: PAIN LEVEL 1-5


   Last Admin: 12/21/19 04:30 Dose:  650 mg


Albuterol Sulfate (Ventolin 0.083% Nebulizer Soln -)  1 amp NEB Q6H PRN


   PRN Reason: SHORT OF BREATH/WHEEZING


Heparin Sodium (Porcine) (Heparin -)  5,000 unit SQ BID ROLY


   Last Admin: 12/21/19 09:07 Dose:  5,000 unit


Insulin Aspart (Novolog Vial Sliding Scale -)  1 vial SQ TIDAC ROLY; Protocol


   Last Admin: 12/21/19 11:14 Dose:  Not Given


Insulin Aspart (Novolog Vial Sliding Scale -)  1 vial SQ HS ROLY; Protocol


   Last Admin: 12/20/19 21:24 Dose:  Not Given











- Objective


Vital Signs: 


 Vital Signs











Temperature  98.1 F   12/21/19 10:03


 


Pulse Rate  88   12/21/19 10:03


 


Respiratory Rate  18   12/21/19 10:03


 


Blood Pressure  120/67   12/21/19 10:03


 


O2 Sat by Pulse Oximetry (%)  99   12/21/19 09:00











Constitutional: Yes: No Distress, Calm, Cachectic


Cardiovascular: Yes: Regular Rate and Rhythm


Respiratory: Yes: Regular, Cough (productive with clear sputum)


Gastrointestinal: Yes: Normal Bowel Sounds, Soft


Genitourinary: Yes: WNL


Musculoskeletal: Yes: WNL


Extremities: Yes: WNL


Edema: No


Peripheral Pulses WNL: Yes


Neurological: Yes: Alert, Oriented


Psychiatric: Yes: Alert, Oriented


Labs: 


 CBC, BMP





 12/20/19 05:25 





 12/20/19 05:25 











Assessment/Plan





(1) Right upper lobe consolidation


Assessment/Plan: 


-Pulm consult


-no leukocytosis


-afebrile


-O2 via NC


-keep SpO2 >90%


-CXR shows extensive COPD


-Bronchodilators


-ID consult


Code(s): J18.1 - LOBAR PNEUMONIA, UNSPECIFIED ORGANISM   





(2) COPD suggested by initial evaluation


Assessment/Plan: 


-Pulm consult


-no leukocytosis


-afebrile


-O2 via NC


-keep SpO2 >90%


-CXR shows extensive COPD


-Bronchodilators


Code(s): J44.9 - CHRONIC OBSTRUCTIVE PULMONARY DISEASE, UNSPECIFIED   





(3) Moderate protein-calorie malnutrition


Assessment/Plan: 


-dietary consult


Code(s): E44.0 - MODERATE PROTEIN-CALORIE MALNUTRITION   





(4) Poorly controlled diabetes mellitus


Assessment/Plan: 


-ISS


-HgA1c 7.0


-BGM ACHS


Code(s): E11.65 - TYPE 2 DIABETES MELLITUS WITH HYPERGLYCEMIA   





(5) Tuberculosis


Assessment/Plan: 


-Pulm consult


-no leukocytosis


-afebrile


-O2 via NC


-keep SpO2 >90%


-CXR shows extensive COPD


-Bronchodilators


-AFB pending result


-quantiferon pending


Code(s): A15.9 - RESPIRATORY TUBERCULOSIS UNSPECIFIED   





 Assessment/Plan 





see problem list


dvt ppx

## 2019-12-22 RX ADMIN — INSULIN ASPART SCH UNITS: 100 INJECTION, SOLUTION INTRAVENOUS; SUBCUTANEOUS at 11:04

## 2019-12-22 RX ADMIN — HEPARIN SODIUM SCH: 5000 INJECTION, SOLUTION INTRAVENOUS; SUBCUTANEOUS at 11:06

## 2019-12-22 RX ADMIN — HEPARIN SODIUM SCH UNIT: 5000 INJECTION, SOLUTION INTRAVENOUS; SUBCUTANEOUS at 11:03

## 2019-12-22 RX ADMIN — INSULIN ASPART SCH: 100 INJECTION, SOLUTION INTRAVENOUS; SUBCUTANEOUS at 11:06

## 2019-12-22 RX ADMIN — INSULIN ASPART SCH UNIT: 100 INJECTION, SOLUTION INTRAVENOUS; SUBCUTANEOUS at 21:13

## 2019-12-22 RX ADMIN — HEPARIN SODIUM SCH UNIT: 5000 INJECTION, SOLUTION INTRAVENOUS; SUBCUTANEOUS at 21:13

## 2019-12-22 RX ADMIN — INSULIN ASPART SCH: 100 INJECTION, SOLUTION INTRAVENOUS; SUBCUTANEOUS at 06:10

## 2019-12-22 RX ADMIN — INSULIN ASPART SCH: 100 INJECTION, SOLUTION INTRAVENOUS; SUBCUTANEOUS at 17:27

## 2019-12-22 NOTE — PN
Progress Note, Physician


Chief Complaint: 








R consolidation, R/O TB


COPD


History of Present Illness: 





NAD


Denies any SOB


AFB pending


Seen by ID





- Current Medication List


Current Medications: 


Active Medications





Acetaminophen (Tylenol -)  650 mg PO Q4H PRN


   PRN Reason: PAIN LEVEL 1-5


   Last Admin: 12/21/19 22:03 Dose:  650 mg


Albuterol Sulfate (Ventolin 0.083% Nebulizer Soln -)  1 amp NEB Q6H PRN


   PRN Reason: SHORT OF BREATH/WHEEZING


Heparin Sodium (Porcine) (Heparin -)  5,000 unit SQ BID ROLY


   Last Admin: 12/21/19 22:03 Dose:  5,000 unit


Insulin Aspart (Novolog Vial Sliding Scale -)  1 vial SQ TIDAC ROLY; Protocol


   Last Admin: 12/22/19 06:10 Dose:  Not Given


Insulin Aspart (Novolog Vial Sliding Scale -)  1 vial SQ HS ROLY; Protocol


   Last Admin: 12/21/19 21:16 Dose:  Not Given











- Objective


Vital Signs: 


 Vital Signs











Temperature  98.8 F   12/22/19 05:51


 


Pulse Rate  92 H  12/22/19 05:51


 


Respiratory Rate  18   12/22/19 05:51


 


Blood Pressure  99/46 L  12/22/19 05:51


 


O2 Sat by Pulse Oximetry (%)  99   12/21/19 09:00











Constitutional: Yes: No Distress, Calm, Cachectic


Cardiovascular: Yes: Regular Rate and Rhythm


Respiratory: Yes: Regular


Gastrointestinal: Yes: Normal Bowel Sounds, Soft


Genitourinary: Yes: WNL


Musculoskeletal: Yes: WNL


Extremities: Yes: WNL


Edema: No


Peripheral Pulses WNL: Yes


Neurological: Yes: Alert, Oriented


Psychiatric: Yes: Alert, Oriented


Labs: 


 CBC, BMP





 12/20/19 05:25 





 12/20/19 05:25 











Assessment/Plan





(1) Right upper lobe consolidation


Assessment/Plan: 


-Pulm consult


-no leukocytosis


-afebrile


-O2 via NC


-keep SpO2 >90%


-CXR shows extensive COPD


-Bronchodilators


-ID consult


Code(s): J18.1 - LOBAR PNEUMONIA, UNSPECIFIED ORGANISM   





(2) COPD suggested by initial evaluation


Assessment/Plan: 


-Pulm consult


-no leukocytosis


-afebrile


-O2 via NC


-keep SpO2 >90%


-CXR shows extensive COPD


-Bronchodilators


Code(s): J44.9 - CHRONIC OBSTRUCTIVE PULMONARY DISEASE, UNSPECIFIED   





(3) Moderate protein-calorie malnutrition


Assessment/Plan: 


-dietary consult


Code(s): E44.0 - MODERATE PROTEIN-CALORIE MALNUTRITION   





(4) Poorly controlled diabetes mellitus


Assessment/Plan: 


-ISS


-HgA1c 7.0


-BGM ACHS


Code(s): E11.65 - TYPE 2 DIABETES MELLITUS WITH HYPERGLYCEMIA   





(5) Tuberculosis


Assessment/Plan: 


-Pulm consult


-no leukocytosis


-afebrile


-O2 via NC


-keep SpO2 >90%


-CXR shows extensive COPD


-Bronchodilators


-AFB pending result


-quantiferon pending


Code(s): A15.9 - RESPIRATORY TUBERCULOSIS UNSPECIFIED   





 Assessment/Plan 





see problem list


dvt ppx

## 2019-12-22 NOTE — PN
Progress Note (short form)





- Note


Progress Note: 





PULMONARY





Denies shortness of breath. +cough with clear sputum. Quantiferon negative.





 Vital Signs











 Period  Temp  Pulse  Resp  BP Sys/Michael  Pulse Ox


 


 Last 24 Hr  98 F-98.8 F    18-20  /46-63  











Gen: NAD at rest


Heart: RRR


Lung: decreased breath sounds at the bases


Abd: soft, nontender


Ext: no edema





 CBC, BMP





 12/20/19 05:25 





 12/20/19 05:25 





Active Medications





Acetaminophen (Tylenol -)  650 mg PO Q4H PRN


   PRN Reason: PAIN LEVEL 1-5


   Last Admin: 12/21/19 22:03 Dose:  650 mg


Albuterol Sulfate (Ventolin 0.083% Nebulizer Soln -)  1 amp NEB Q6H PRN


   PRN Reason: SHORT OF BREATH/WHEEZING


Heparin Sodium (Porcine) (Heparin -)  5,000 unit SQ BID ROLY


   Last Admin: 12/22/19 11:06 Dose:  Not Given


Insulin Aspart (Novolog Vial Sliding Scale -)  1 vial SQ TIDAC ROLY; Protocol


   Last Admin: 12/22/19 11:06 Dose:  Not Given


Insulin Aspart (Novolog Vial Sliding Scale -)  1 vial SQ HS ROLY; Protocol


   Last Admin: 12/21/19 21:16 Dose:  Not Given








A/P


Pulmonary Consolidation


r/o TB


COPD


DM


h/o CVA





-  given negative quantiferon, CT findings likely chronic


-  sputum AFB x 3


-  inhaled bronchodilators as needed


-  DVT prophylaxis

## 2019-12-23 RX ADMIN — INSULIN ASPART SCH UNITS: 100 INJECTION, SOLUTION INTRAVENOUS; SUBCUTANEOUS at 11:48

## 2019-12-23 RX ADMIN — HEPARIN SODIUM SCH UNIT: 5000 INJECTION, SOLUTION INTRAVENOUS; SUBCUTANEOUS at 10:11

## 2019-12-23 RX ADMIN — INSULIN ASPART SCH: 100 INJECTION, SOLUTION INTRAVENOUS; SUBCUTANEOUS at 06:02

## 2019-12-23 RX ADMIN — HEPARIN SODIUM SCH UNIT: 5000 INJECTION, SOLUTION INTRAVENOUS; SUBCUTANEOUS at 21:15

## 2019-12-23 RX ADMIN — INSULIN ASPART SCH: 100 INJECTION, SOLUTION INTRAVENOUS; SUBCUTANEOUS at 21:10

## 2019-12-23 RX ADMIN — INSULIN ASPART SCH UNITS: 100 INJECTION, SOLUTION INTRAVENOUS; SUBCUTANEOUS at 17:51

## 2019-12-23 NOTE — PN
Progress Note, Physician


Chief Complaint: 





ASLEEP


NO EVENTS OVERNIGHT





- Current Medication List


Current Medications: 


Active Medications





Acetaminophen (Tylenol -)  650 mg PO Q4H PRN


   PRN Reason: PAIN LEVEL 1-5


   Last Admin: 12/21/19 22:03 Dose:  650 mg


Albuterol Sulfate (Ventolin 0.083% Nebulizer Soln -)  1 amp NEB Q6H PRN


   PRN Reason: SHORT OF BREATH/WHEEZING


Heparin Sodium (Porcine) (Heparin -)  5,000 unit SQ BID ROLY


   Last Admin: 12/22/19 21:13 Dose:  5,000 unit


Insulin Aspart (Novolog Vial Sliding Scale -)  1 vial SQ TIDAC ROLY; Protocol


   Last Admin: 12/23/19 06:02 Dose:  Not Given


Insulin Aspart (Novolog Vial Sliding Scale -)  1 vial SQ HS ROLY; Protocol


   Last Admin: 12/22/19 21:13 Dose:  2 unit











- Objective


Vital Signs: 


 Vital Signs











Temperature  98.4 F   12/23/19 06:00


 


Pulse Rate  83   12/23/19 06:00


 


Respiratory Rate  20   12/23/19 06:00


 


Blood Pressure  92/57 L  12/23/19 06:00


 


O2 Sat by Pulse Oximetry (%)  99   12/22/19 20:17











Constitutional: Yes: Mild Distress


Cardiovascular: Yes: Regular Rate and Rhythm


Respiratory: Yes: CTA Bilaterally


Gastrointestinal: Yes: WNL


Labs: 


 CBC, BMP





 12/20/19 05:25 





 12/20/19 05:25 











Problem List





- Problems


(1) Right upper lobe consolidation


Code(s): J18.1 - LOBAR PNEUMONIA, UNSPECIFIED ORGANISM   





(2) Smoker


Code(s): F17.200 - NICOTINE DEPENDENCE, UNSPECIFIED, UNCOMPLICATED   





(3) COPD suggested by initial evaluation


Code(s): J44.9 - CHRONIC OBSTRUCTIVE PULMONARY DISEASE, UNSPECIFIED   





Assessment/Plan





PULM/ID CONSULT APPRECIATED


QUANTEFIRON NEGATIVE


AFB PENDING


OOB TO CHAIR


DVT PROPHLAXIS

## 2019-12-23 NOTE — PN
Progress Note (short form)





- Note


Progress Note: 


Denies shortness of breath.


Still with some cough with clear sputum. 





Quantiferon negative.





 Intake & Output











 12/20/19 12/21/19 12/22/19 12/23/19





 23:59 23:59 23:59 23:59


 


Intake Total  


 


Output Total  810  


 


Balance 240 -270 1420 


 


Weight 100 lb   











 Last Vital Signs











Temp Pulse Resp BP Pulse Ox


 


 97.9 F   90   16   106/58 L  99 


 


 12/23/19 10:13  12/23/19 10:13  12/23/19 10:13  12/23/19 10:13  12/23/19 10:00








Active Medications





Acetaminophen (Tylenol -)  650 mg PO Q4H PRN


   PRN Reason: PAIN LEVEL 1-5


   Last Admin: 12/21/19 22:03 Dose:  650 mg


Albuterol Sulfate (Ventolin 0.083% Nebulizer Soln -)  1 amp NEB Q6H PRN


   PRN Reason: SHORT OF BREATH/WHEEZING


Heparin Sodium (Porcine) (Heparin -)  5,000 unit SQ BID ROLY


   Last Admin: 12/23/19 10:11 Dose:  5,000 unit


Insulin Aspart (Novolog Vial Sliding Scale -)  1 vial SQ TIDAC ROLY; Protocol


   Last Admin: 12/23/19 06:02 Dose:  Not Given


Insulin Aspart (Novolog Vial Sliding Scale -)  1 vial SQ HS ROLY; Protocol


   Last Admin: 12/22/19 21:13 Dose:  2 unit








Gen: NAD at rest


Heart: RRR


Lung: decreased breath sounds at the bases


Abd: soft, nontender


Ext: no edema





 Laboratory Results - last 24 hr











  12/22/19 12/22/19 12/22/19





  17:26 19:58 21:06


 


POC Glucometer  133  209  211














  12/23/19





  05:55


 


POC Glucometer  138

















A/P


Pulmonary Consolidation


r/o TB


COPD


DM


h/o CVA





-  given negative quantiferon, CT findings likely chronic


-  sputum AFB x 3


-  inhaled bronchodilators as needed


-  DVT prophylaxis





Dr Phipps 








Problem List





- Problems


(1) Smoker


Code(s): F17.200 - NICOTINE DEPENDENCE, UNSPECIFIED, UNCOMPLICATED   





(2) Right upper lobe consolidation


Code(s): J18.1 - LOBAR PNEUMONIA, UNSPECIFIED ORGANISM   





(3) COPD suggested by initial evaluation


Code(s): J44.9 - CHRONIC OBSTRUCTIVE PULMONARY DISEASE, UNSPECIFIED   





(4) Moderate protein-calorie malnutrition


Code(s): E44.0 - MODERATE PROTEIN-CALORIE MALNUTRITION   





(5) Musculoskeletal pain


Code(s): M79.18 - MYALGIA, OTHER SITE   





(6) PNA (pneumonia)


Code(s): J18.9 - PNEUMONIA, UNSPECIFIED ORGANISM   





(7) Tuberculosis


Code(s): A15.9 - RESPIRATORY TUBERCULOSIS UNSPECIFIED

## 2019-12-24 LAB
ALBUMIN SERPL-MCNC: 3.2 G/DL (ref 3.4–5)
ALP SERPL-CCNC: 80 U/L (ref 45–117)
ALT SERPL-CCNC: 21 U/L (ref 13–61)
ANION GAP SERPL CALC-SCNC: 6 MMOL/L (ref 8–16)
AST SERPL-CCNC: 25 U/L (ref 15–37)
BILIRUB SERPL-MCNC: 0.3 MG/DL (ref 0.2–1)
BUN SERPL-MCNC: 18.5 MG/DL (ref 7–18)
CALCIUM SERPL-MCNC: 9 MG/DL (ref 8.5–10.1)
CHLORIDE SERPL-SCNC: 100 MMOL/L (ref 98–107)
CO2 SERPL-SCNC: 26 MMOL/L (ref 21–32)
CREAT SERPL-MCNC: 0.5 MG/DL (ref 0.55–1.3)
DEPRECATED RDW RBC AUTO: 13.3 % (ref 11.9–15.9)
GLUCOSE SERPL-MCNC: 186 MG/DL (ref 74–106)
HCT VFR BLD CALC: 38.9 % (ref 35.4–49)
HGB BLD-MCNC: 12.8 GM/DL (ref 11.7–16.9)
MCH RBC QN AUTO: 31.5 PG (ref 25.7–33.7)
MCHC RBC AUTO-ENTMCNC: 32.9 G/DL (ref 32–35.9)
MCV RBC: 95.7 FL (ref 80–96)
PLATELET # BLD AUTO: 232 K/MM3 (ref 134–434)
PMV BLD: 8.6 FL (ref 7.5–11.1)
POTASSIUM SERPLBLD-SCNC: 4.3 MMOL/L (ref 3.5–5.1)
PROT SERPL-MCNC: 7.8 G/DL (ref 6.4–8.2)
RBC # BLD AUTO: 4.07 M/MM3 (ref 4–5.6)
SODIUM SERPL-SCNC: 132 MMOL/L (ref 136–145)
WBC # BLD AUTO: 10.1 K/MM3 (ref 4–10)

## 2019-12-24 RX ADMIN — INSULIN ASPART SCH: 100 INJECTION, SOLUTION INTRAVENOUS; SUBCUTANEOUS at 11:41

## 2019-12-24 RX ADMIN — HEPARIN SODIUM SCH UNIT: 5000 INJECTION, SOLUTION INTRAVENOUS; SUBCUTANEOUS at 22:14

## 2019-12-24 RX ADMIN — INSULIN ASPART SCH UNITS: 100 INJECTION, SOLUTION INTRAVENOUS; SUBCUTANEOUS at 06:59

## 2019-12-24 RX ADMIN — HEPARIN SODIUM SCH: 5000 INJECTION, SOLUTION INTRAVENOUS; SUBCUTANEOUS at 10:51

## 2019-12-24 RX ADMIN — INSULIN ASPART SCH: 100 INJECTION, SOLUTION INTRAVENOUS; SUBCUTANEOUS at 22:14

## 2019-12-24 RX ADMIN — ACETAMINOPHEN PRN MG: 325 TABLET ORAL at 17:30

## 2019-12-24 RX ADMIN — INSULIN ASPART SCH UNITS: 100 INJECTION, SOLUTION INTRAVENOUS; SUBCUTANEOUS at 17:27

## 2019-12-24 NOTE — PN
Progress Note, Physician


History of Present Illness: 





pulmonary





alert,comfortable,-resp distress





- Current Medication List


Current Medications: 


Active Medications





Acetaminophen (Tylenol -)  650 mg PO Q4H PRN


   PRN Reason: PAIN LEVEL 1-5


   Last Admin: 12/21/19 22:03 Dose:  650 mg


Albuterol Sulfate (Ventolin 0.083% Nebulizer Soln -)  1 amp NEB Q6H PRN


   PRN Reason: SHORT OF BREATH/WHEEZING


Heparin Sodium (Porcine) (Heparin -)  5,000 unit SQ BID ROLY


   Last Admin: 12/23/19 21:15 Dose:  5,000 unit


Insulin Aspart (Novolog Vial Sliding Scale -)  1 vial SQ TIDAC ROLY; Protocol


   Last Admin: 12/24/19 06:59 Dose:  2 units


Insulin Aspart (Novolog Vial Sliding Scale -)  1 vial SQ HS ROLY; Protocol


   Last Admin: 12/23/19 21:10 Dose:  Not Given











- Objective


Vital Signs: 


 Vital Signs











Temperature  98.1 F   12/24/19 05:00


 


Pulse Rate  90   12/24/19 05:00


 


Respiratory Rate  16   12/24/19 05:00


 


Blood Pressure  103/62   12/24/19 05:00


 


O2 Sat by Pulse Oximetry (%)  95   12/23/19 21:00











Constitutional: Yes: Calm, Cachectic


Eyes: Yes: WNL


HENT: Yes: WNL


Neck: Yes: WNL


Cardiovascular: Yes: Regular Rate and Rhythm, S1, S2


Respiratory: Yes: Diminished


Gastrointestinal: Yes: Normal Bowel Sounds, Soft


Extremities: Yes: WNL


Edema: No


Labs: 


 CBC, BMP





 12/24/19 05:10 





 12/24/19 05:10 











Problem List





- Problems


(1) Right upper lobe consolidation


Code(s): J18.1 - LOBAR PNEUMONIA, UNSPECIFIED ORGANISM   





(2) Smoker


Code(s): F17.200 - NICOTINE DEPENDENCE, UNSPECIFIED, UNCOMPLICATED   





(3) Abdominal pain


Code(s): R10.9 - UNSPECIFIED ABDOMINAL PAIN   





(4) Failure to thrive


Code(s): CHC1096 -    


Qualifiers: 


   Failure to thrive age range: in adult   Qualified Code(s): R62.7 - Adult 

failure to thrive   





(5) Moderate malnutrition


Code(s): E44.0 - MODERATE PROTEIN-CALORIE MALNUTRITION   





(6) Moderate protein-calorie malnutrition


Code(s): E44.0 - MODERATE PROTEIN-CALORIE MALNUTRITION   





(7) PNA (pneumonia)


Code(s): J18.9 - PNEUMONIA, UNSPECIFIED ORGANISM   





Assessment/Plan





A/P


Pulmonary Consolidation


r/o TB


COPD


DM


h/o CVA





-  given negative quantiferon, CT findings likely chronic


-  sputum AFB x 3


-  inhaled bronchodilators as needed


-  DVT prophylaxis





DR TALLEY

## 2019-12-24 NOTE — PN
Progress Note (short form)





- Note


Progress Note: 





IN BED AWAITING AFB SMEARS FOR CX/CYTO/ACID FAST


NO FEVERS NO CHILLS


CONTINUE MONITORING AND R/O TB


PULM/ID F/U APPRECIATED


DVT PROPHYLAXIS


SMOKING CESSATION





Problem List





- Problems


(1) Right upper lobe consolidation


Code(s): J18.1 - LOBAR PNEUMONIA, UNSPECIFIED ORGANISM   





(2) Smoker


Code(s): F17.200 - NICOTINE DEPENDENCE, UNSPECIFIED, UNCOMPLICATED   





(3) COPD suggested by initial evaluation


Code(s): J44.9 - CHRONIC OBSTRUCTIVE PULMONARY DISEASE, UNSPECIFIED

## 2019-12-25 RX ADMIN — IPRATROPIUM BROMIDE AND ALBUTEROL SULFATE SCH AMP: .5; 3 SOLUTION RESPIRATORY (INHALATION) at 20:22

## 2019-12-25 RX ADMIN — ACETAMINOPHEN PRN MG: 325 TABLET ORAL at 19:47

## 2019-12-25 RX ADMIN — ACETAMINOPHEN PRN MG: 325 TABLET ORAL at 03:43

## 2019-12-25 RX ADMIN — INSULIN ASPART SCH UNITS: 100 INJECTION, SOLUTION INTRAVENOUS; SUBCUTANEOUS at 06:52

## 2019-12-25 RX ADMIN — HEPARIN SODIUM SCH: 5000 INJECTION, SOLUTION INTRAVENOUS; SUBCUTANEOUS at 09:38

## 2019-12-25 RX ADMIN — INSULIN ASPART SCH: 100 INJECTION, SOLUTION INTRAVENOUS; SUBCUTANEOUS at 21:31

## 2019-12-25 RX ADMIN — IPRATROPIUM BROMIDE AND ALBUTEROL SULFATE SCH AMP: .5; 3 SOLUTION RESPIRATORY (INHALATION) at 14:20

## 2019-12-25 RX ADMIN — HEPARIN SODIUM SCH UNIT: 5000 INJECTION, SOLUTION INTRAVENOUS; SUBCUTANEOUS at 21:31

## 2019-12-25 RX ADMIN — INSULIN ASPART SCH UNITS: 100 INJECTION, SOLUTION INTRAVENOUS; SUBCUTANEOUS at 11:52

## 2019-12-25 RX ADMIN — INSULIN ASPART SCH UNITS: 100 INJECTION, SOLUTION INTRAVENOUS; SUBCUTANEOUS at 16:48

## 2019-12-25 NOTE — PN
Progress Note, Physician


History of Present Illness: 





PULMONARY





ALERT,C/O COUGH,MILD CONGESTION,+ SWEATS LAST NIGHT





- Current Medication List


Current Medications: 


Active Medications





Acetaminophen (Tylenol -)  650 mg PO Q4H PRN


   PRN Reason: PAIN LEVEL 1-5


   Last Admin: 12/25/19 03:43 Dose:  650 mg


Heparin Sodium (Porcine) (Heparin -)  5,000 unit SQ BID ROLY


   Last Admin: 12/25/19 09:38 Dose:  Not Given


Insulin Aspart (Novolog Vial Sliding Scale -)  1 vial SQ TIDAC ROLY; Protocol


   Last Admin: 12/25/19 06:52 Dose:  4 units


Insulin Aspart (Novolog Vial Sliding Scale -)  1 vial SQ HS ROLY; Protocol


   Last Admin: 12/24/19 22:14 Dose:  Not Given











- Objective


Vital Signs: 


 Vital Signs











Temperature  97.6 F   12/25/19 06:45


 


Pulse Rate  78   12/25/19 06:45


 


Respiratory Rate  18   12/25/19 06:45


 


Blood Pressure  109/56 L  12/25/19 06:45


 


O2 Sat by Pulse Oximetry (%)  95   12/24/19 21:00











Constitutional: Yes: Calm, Cachectic


Eyes: Yes: WNL


HENT: Yes: WNL


Neck: Yes: WNL


Cardiovascular: Yes: Regular Rate and Rhythm, S1, S2


Respiratory: Yes: Rhonchi (SCATTERED RHONCHI)


Gastrointestinal: Yes: Normal Bowel Sounds, Soft


Extremities: Yes: WNL


Edema: No


Labs: 


 CBC, BMP








Problem List





- Problems


(1) Right upper lobe consolidation


Code(s): J18.1 - LOBAR PNEUMONIA, UNSPECIFIED ORGANISM   





(2) Smoker


Code(s): F17.200 - NICOTINE DEPENDENCE, UNSPECIFIED, UNCOMPLICATED   





(3) Abdominal pain


Code(s): R10.9 - UNSPECIFIED ABDOMINAL PAIN   





(4) Failure to thrive


Code(s): [id number] -    


Qualifiers: 


   Failure to thrive age range: in adult   Qualified Code(s): R62.7 - Adult 

failure to thrive   





(5) Moderate malnutrition


Code(s): E44.0 - MODERATE PROTEIN-CALORIE MALNUTRITION   





(6) Moderate protein-calorie malnutrition


Code(s): E44.0 - MODERATE PROTEIN-CALORIE MALNUTRITION   





(7) PNA (pneumonia)


Code(s): J18.9 - PNEUMONIA, UNSPECIFIED ORGANISM   





Assessment/Plan





A/P


Pulmonary Consolidation


r/o TB


COPD


DM


h/o CVA





-  given negative quantiferon, CT findings likely chronic


-  sputum AFB x 3


-  inhaled bronchodilators as needed


-  DVT prophylaxis





DR TALLEY

## 2019-12-25 NOTE — PN
Progress Note, Physician


Chief Complaint: 








R consolidation, R/O TB


COPD


History of Present Illness: 





NAD


Denies any SOB


AFB pending


Quantiferon negative


Seen by ID


refused HIV testing





- Current Medication List


Current Medications: 


Active Medications





Acetaminophen (Tylenol -)  650 mg PO Q4H PRN


   PRN Reason: PAIN LEVEL 1-5


   Last Admin: 12/25/19 03:43 Dose:  650 mg


Heparin Sodium (Porcine) (Heparin -)  5,000 unit SQ BID ROLY


   Last Admin: 12/24/19 22:14 Dose:  5,000 unit


Insulin Aspart (Novolog Vial Sliding Scale -)  1 vial SQ TIDAC ROLY; Protocol


   Last Admin: 12/25/19 06:52 Dose:  4 units


Insulin Aspart (Novolog Vial Sliding Scale -)  1 vial SQ HS ROLY; Protocol


   Last Admin: 12/24/19 22:14 Dose:  Not Given











- Objective


Vital Signs: 


 Vital Signs











Temperature  97.6 F   12/25/19 06:45


 


Pulse Rate  78   12/25/19 06:45


 


Respiratory Rate  18   12/25/19 06:45


 


Blood Pressure  109/56 L  12/25/19 06:45


 


O2 Sat by Pulse Oximetry (%)  95   12/24/19 21:00











Constitutional: Yes: No Distress, Calm, Cachectic


Cardiovascular: Yes: Regular Rate and Rhythm


Respiratory: Yes: Regular


Gastrointestinal: Yes: WNL


Genitourinary: Yes: WNL


Musculoskeletal: Yes: WNL


Extremities: Yes: WNL


Edema: No


Peripheral Pulses WNL: Yes


Neurological: Yes: Alert, Oriented


Psychiatric: Yes: Alert, Oriented


Labs: 


 CBC, BMP





 12/24/19 05:10 





 12/24/19 05:10 











Assessment/Plan





(1) Right upper lobe consolidation


Assessment/Plan: 


-Pulm consult


-no leukocytosis


-afebrile


-O2 via NC


-keep SpO2 >90%


-CXR shows extensive COPD


-Bronchodilators


-ID consult


Code(s): J18.1 - LOBAR PNEUMONIA, UNSPECIFIED ORGANISM   





(2) COPD suggested by initial evaluation


Assessment/Plan: 


-Pulm consult


-no leukocytosis


-afebrile


-O2 via NC


-keep SpO2 >90%


-CXR shows extensive COPD


-Bronchodilators


Code(s): J44.9 - CHRONIC OBSTRUCTIVE PULMONARY DISEASE, UNSPECIFIED   





(3) Moderate protein-calorie malnutrition


Assessment/Plan: 


-dietary consult


Code(s): E44.0 - MODERATE PROTEIN-CALORIE MALNUTRITION   





(4) Poorly controlled diabetes mellitus


Assessment/Plan: 


-ISS


-HgA1c 7.0


-BGM ACHS


Code(s): E11.65 - TYPE 2 DIABETES MELLITUS WITH HYPERGLYCEMIA   





(5) Tuberculosis


Assessment/Plan: 


-Pulm consult


-no leukocytosis


-afebrile


-O2 via NC


-keep SpO2 >90%


-CXR shows extensive COPD


-Bronchodilators


-AFB pending result


-quantiferon negative


Code(s): A15.9 - RESPIRATORY TUBERCULOSIS UNSPECIFIED   





 Assessment/Plan 





see problem list


dvt ppx

## 2019-12-26 RX ADMIN — ACETAMINOPHEN PRN MG: 325 TABLET ORAL at 04:00

## 2019-12-26 RX ADMIN — IPRATROPIUM BROMIDE AND ALBUTEROL SULFATE SCH AMP: .5; 3 SOLUTION RESPIRATORY (INHALATION) at 08:46

## 2019-12-26 RX ADMIN — INSULIN ASPART SCH UNITS: 100 INJECTION, SOLUTION INTRAVENOUS; SUBCUTANEOUS at 06:57

## 2019-12-26 RX ADMIN — INSULIN ASPART SCH UNIT: 100 INJECTION, SOLUTION INTRAVENOUS; SUBCUTANEOUS at 21:58

## 2019-12-26 RX ADMIN — HEPARIN SODIUM SCH: 5000 INJECTION, SOLUTION INTRAVENOUS; SUBCUTANEOUS at 09:26

## 2019-12-26 RX ADMIN — HEPARIN SODIUM SCH: 5000 INJECTION, SOLUTION INTRAVENOUS; SUBCUTANEOUS at 21:59

## 2019-12-26 RX ADMIN — IPRATROPIUM BROMIDE AND ALBUTEROL SULFATE SCH AMP: .5; 3 SOLUTION RESPIRATORY (INHALATION) at 15:08

## 2019-12-26 RX ADMIN — INSULIN ASPART SCH UNITS: 100 INJECTION, SOLUTION INTRAVENOUS; SUBCUTANEOUS at 11:37

## 2019-12-26 RX ADMIN — INSULIN ASPART SCH UNITS: 100 INJECTION, SOLUTION INTRAVENOUS; SUBCUTANEOUS at 17:05

## 2019-12-26 RX ADMIN — IPRATROPIUM BROMIDE AND ALBUTEROL SULFATE SCH AMP: .5; 3 SOLUTION RESPIRATORY (INHALATION) at 20:20

## 2019-12-26 NOTE — PN
Progress Note, Physician


Chief Complaint: 





ASLEEP/COMFORTABLE


NO NEW EVENTS OVER NIGHT





- Current Medication List


Current Medications: 


Active Medications





Acetaminophen (Tylenol -)  650 mg PO Q4H PRN


   PRN Reason: PAIN LEVEL 1-5


   Last Admin: 12/26/19 04:00 Dose:  650 mg


Albuterol/Ipratropium (Duoneb -)  1 amp NEB RTID ROLY


   Last Admin: 12/26/19 08:46 Dose:  1 amp


Heparin Sodium (Porcine) (Heparin -)  5,000 unit SQ BID ROLY


Insulin Aspart (Novolog Vial Sliding Scale -)  1 vial SQ TIDAC ROLY; Protocol


   Last Admin: 12/26/19 06:57 Dose:  3 units


Insulin Aspart (Novolog Vial Sliding Scale -)  1 vial SQ HS ROLY; Protocol


   Last Admin: 12/25/19 21:31 Dose:  Not Given











- Objective


Vital Signs: 


 Vital Signs











Temperature  98.2 F   12/26/19 07:00


 


Pulse Rate  78   12/26/19 07:00


 


Respiratory Rate  18   12/26/19 07:00


 


Blood Pressure  106/51 L  12/26/19 07:00


 


O2 Sat by Pulse Oximetry (%)  96   12/25/19 20:39











Constitutional: Yes: Mild Distress


Cardiovascular: Yes: Regular Rate and Rhythm


Respiratory: Yes: Other


Gastrointestinal: Yes: Soft


Genitourinary: Yes: WNL


Labs: 


 CBC, BMP





 12/24/19 05:10 





 12/24/19 05:10 











Problem List





- Problems


(1) Right upper lobe consolidation


Code(s): J18.1 - LOBAR PNEUMONIA, UNSPECIFIED ORGANISM   





(2) Smoker


Code(s): F17.200 - NICOTINE DEPENDENCE, UNSPECIFIED, UNCOMPLICATED   





(3) COPD suggested by initial evaluation


Code(s): J44.9 - CHRONIC OBSTRUCTIVE PULMONARY DISEASE, UNSPECIFIED   





Assessment/Plan





PULM/ID CONSULT APPRECIATED


QUANTEFIRON NEGATIVE


AFB PENDING


OOB TO CHAIR


DVT PROPHLAXIS

## 2019-12-26 NOTE — PN
Progress Note (short form)





- Note


Progress Note: 


Denies shortness of breath.


Still with some cough with clear sputum. 





 Intake & Output











 12/23/19 12/24/19 12/25/19 12/26/19





 23:59 23:59 23:59 23:59


 


Intake Total 240 1540 720 500


 


Output Total  300 200 


 


Balance 240 1240 520 500


 


Weight 100 lb 2 oz   








 Last Vital Signs











Temp Pulse Resp BP Pulse Ox


 


 98.2 F   78   18   106/51 L  96 


 


 12/26/19 07:00  12/26/19 07:00  12/26/19 07:00  12/26/19 07:00  12/25/19 20:39








Active Medications





Acetaminophen (Tylenol -)  650 mg PO Q4H PRN


   PRN Reason: PAIN LEVEL 1-5


   Last Admin: 12/26/19 04:00 Dose:  650 mg


Albuterol/Ipratropium (Duoneb -)  1 amp NEB RTID [facility]


   Last Admin: 12/26/19 08:46 Dose:  1 amp


Heparin Sodium (Porcine) (Heparin -)  5,000 unit SQ BID ROLY


   Last Admin: 12/26/19 09:26 Dose:  Not Given


Insulin Aspart (Novolog Vial Sliding Scale -)  1 vial SQ TIDAC [facility]; Protocol


   Last Admin: 12/26/19 06:57 Dose:  3 units


Insulin Aspart (Novolog Vial Sliding Scale -)  1 vial SQ HS ROLY; Protocol


   Last Admin: 12/25/19 21:31 Dose:  Not Given











Gen: NAD at rest


Heart: RRR


Lung: decreased breath sounds at the bases


Abd: soft, nontender


Ext: no edema





 








A/P


Pulmonary Consolidation


r/o TB


COPD


DM


h/o CVA





-  given negative quantiferon, CT findings likely chronic


-  sputum AFB x 3: 1 reported negative and 2 are to be reported 


-  inhaled bronchodilators as needed


-  DVT prophylaxis





Dr Phipps 








Problem List





- Problems


(1) Smoker


Code(s): F17.200 - NICOTINE DEPENDENCE, UNSPECIFIED, UNCOMPLICATED   





(2) Right upper lobe consolidation


Code(s): J18.1 - LOBAR PNEUMONIA, UNSPECIFIED ORGANISM   





(3) COPD suggested by initial evaluation


Code(s): J44.9 - CHRONIC OBSTRUCTIVE PULMONARY DISEASE, UNSPECIFIED   





(4) Moderate protein-calorie malnutrition


Code(s): E44.0 - MODERATE PROTEIN-CALORIE MALNUTRITION   





(5) Musculoskeletal pain


Code(s): M79.18 - MYALGIA, OTHER SITE   





(6) PNA (pneumonia)


Code(s): J18.9 - PNEUMONIA, UNSPECIFIED ORGANISM   





(7) Tuberculosis


Code(s): A15.9 - RESPIRATORY TUBERCULOSIS UNSPECIFIED

## 2019-12-27 LAB
ALBUMIN SERPL-MCNC: 3.1 G/DL (ref 3.4–5)
ALP SERPL-CCNC: 82 U/L (ref 45–117)
ALT SERPL-CCNC: 21 U/L (ref 13–61)
ANION GAP SERPL CALC-SCNC: 6 MMOL/L (ref 8–16)
AST SERPL-CCNC: 16 U/L (ref 15–37)
BILIRUB SERPL-MCNC: 0.2 MG/DL (ref 0.2–1)
BUN SERPL-MCNC: 19.4 MG/DL (ref 7–18)
CALCIUM SERPL-MCNC: 8.9 MG/DL (ref 8.5–10.1)
CHLORIDE SERPL-SCNC: 100 MMOL/L (ref 98–107)
CO2 SERPL-SCNC: 28 MMOL/L (ref 21–32)
CREAT SERPL-MCNC: 0.6 MG/DL (ref 0.55–1.3)
DEPRECATED RDW RBC AUTO: 13.4 % (ref 11.9–15.9)
GLUCOSE SERPL-MCNC: 192 MG/DL (ref 74–106)
HCT VFR BLD CALC: 39.3 % (ref 35.4–49)
HGB BLD-MCNC: 12.9 GM/DL (ref 11.7–16.9)
MCH RBC QN AUTO: 31.4 PG (ref 25.7–33.7)
MCHC RBC AUTO-ENTMCNC: 32.7 G/DL (ref 32–35.9)
MCV RBC: 95.8 FL (ref 80–96)
PLATELET # BLD AUTO: 271 K/MM3 (ref 134–434)
PMV BLD: 9 FL (ref 7.5–11.1)
POTASSIUM SERPLBLD-SCNC: 4.5 MMOL/L (ref 3.5–5.1)
PROT SERPL-MCNC: 7.8 G/DL (ref 6.4–8.2)
RBC # BLD AUTO: 4.1 M/MM3 (ref 4–5.6)
SODIUM SERPL-SCNC: 135 MMOL/L (ref 136–145)
WBC # BLD AUTO: 6.9 K/MM3 (ref 4–10)

## 2019-12-27 RX ADMIN — INSULIN ASPART SCH: 100 INJECTION, SOLUTION INTRAVENOUS; SUBCUTANEOUS at 17:40

## 2019-12-27 RX ADMIN — IPRATROPIUM BROMIDE AND ALBUTEROL SULFATE SCH AMP: .5; 3 SOLUTION RESPIRATORY (INHALATION) at 20:38

## 2019-12-27 RX ADMIN — INSULIN ASPART SCH UNITS: 100 INJECTION, SOLUTION INTRAVENOUS; SUBCUTANEOUS at 11:24

## 2019-12-27 RX ADMIN — IPRATROPIUM BROMIDE AND ALBUTEROL SULFATE SCH: .5; 3 SOLUTION RESPIRATORY (INHALATION) at 15:35

## 2019-12-27 RX ADMIN — HEPARIN SODIUM SCH: 5000 INJECTION, SOLUTION INTRAVENOUS; SUBCUTANEOUS at 21:48

## 2019-12-27 RX ADMIN — HEPARIN SODIUM SCH: 5000 INJECTION, SOLUTION INTRAVENOUS; SUBCUTANEOUS at 10:09

## 2019-12-27 RX ADMIN — INSULIN ASPART SCH UNIT: 100 INJECTION, SOLUTION INTRAVENOUS; SUBCUTANEOUS at 21:51

## 2019-12-27 RX ADMIN — INSULIN ASPART SCH UNITS: 100 INJECTION, SOLUTION INTRAVENOUS; SUBCUTANEOUS at 06:27

## 2019-12-27 RX ADMIN — IPRATROPIUM BROMIDE AND ALBUTEROL SULFATE SCH AMP: .5; 3 SOLUTION RESPIRATORY (INHALATION) at 08:16

## 2019-12-27 RX ADMIN — ACETAMINOPHEN PRN MG: 325 TABLET ORAL at 02:34

## 2019-12-27 NOTE — PN
Progress Note, Physician


History of Present Illness: 





REPORTS COUGH PRODUCTIVE OF YELLOW SPUTUM


NO HEMOPTYSIS


NO F/C


QUANTIFERON (-)


AFB X 1 (-)    2 PENDING





- Current Medication List


Current Medications: 


Active Medications





Acetaminophen (Tylenol -)  650 mg PO Q4H PRN


   PRN Reason: PAIN LEVEL 1-5


   Last Admin: 12/27/19 02:34 Dose:  650 mg


Albuterol/Ipratropium (Duoneb -)  1 amp NEB RTID On [facility] of [facility]


   Last Admin: 12/27/19 15:35 Dose:  Not Given


Heparin Sodium (Porcine) (Heparin -)  5,000 unit SQ BID ROLY


   Last Admin: 12/27/19 10:09 Dose:  Not Given


Insulin Aspart (Novolog Vial Sliding Scale -)  1 vial SQ TIDAC On license of [facility]; Protocol


   Last Admin: 12/27/19 17:40 Dose:  Not Given


Insulin Aspart (Novolog Vial Sliding Scale -)  1 vial SQ HS On license of [facility]; Protocol


   Last Admin: 12/26/19 21:58 Dose:  2 unit











- Objective


Vital Signs: 


 Vital Signs











Temperature  98.2 F   12/27/19 13:59


 


Pulse Rate  96 H  12/27/19 13:59


 


Respiratory Rate  17   12/27/19 13:59


 


Blood Pressure  112/67   12/27/19 13:59


 


O2 Sat by Pulse Oximetry (%)  96   12/27/19 09:00











Constitutional: Yes: No Distress


Cardiovascular: Yes: Regular Rate and Rhythm, S1, S2


Respiratory: Yes: Rhonchi


Gastrointestinal: Yes: Normal Bowel Sounds, Soft.  No: Tenderness


Labs: 


 CBC, BMP





 12/27/19 13:20 





 12/27/19 13:20 











Assessment/Plan





CHRONIC BRONCHIECTASIS


DOUBT TB


AWAIT 2ND AND 3RD SP[employer]


IF NEGATIVE, D/C ISOLATION


OBSERVE OFF ANTIBIOTICS

## 2019-12-27 NOTE — CONS
DATE OF CONSULTATION:  12/21/2019

 

HISTORY OF PRESENT ILLNESS:  The patient is a 68-year-old male being evaluated for

possible pulmonary tuberculosis.  History was obtained from the chart as he cannot

give a reliable history.  He was recently hospitalized from December 5 to December 6

with a right upper lobe pneumonia.  At that time, he was being evaluated for possible

tuberculosis.  The patient eloped.  According to the chart, he was advised by the

Health Department to return to the hospital.  He was readmitted on December 18, 2019.

 A CT scan from his previous admission showed a right upper lobe infiltrate which

appeared chronic in nature.  There was extensive consolidation and atelectasis within

the right upper lobe with air bronchograms.  

 

The patient reports a 60-pound weight loss over the past 10 years.  He reports a

cough productive of yellowish sputum.  He denies any hemoptysis.  No complaints of

chest pain.  The patient states he was born in [place].  He has been living in

[place] since age 10.  He denies any exposure to TB.  His TBD has been negative in

the past.  In addition, he has had HIV testing in the past which has been negative.  

 

PAST MEDICAL HISTORY:  Positive for COPD, diabetes mellitus, stroke.  

 

ALLERGIES:  No known drug allergies.

 

MEDICATIONS:  Tylenol, albuterol, insulin.  

 

SOCIAL HISTORY:  He resides in the community.  He is a smoker.  He denies HIV risk

factors and reports that he tested HIV negative in the past. 

 

REVIEW OF SYSTEMS:  

Neurologic:  Positive for stroke. 

Cardiac:  Negative for chest pain or palpitations. 

Respiratory:  As per HPI.

Gastrointestinal:  Negative for vomiting or diarrhea.

Genitourinary:  Negative for urinary tract infection. 

 

LABORATORY DATA:  White count 4.7, 59 neutrophils, 23 lymphocytes, 11 monocytes, 4

eosinophils.  Hematocrit 36.6, platelet count 234, BUN 19, creatinine 0.7, serum AFP

negative x1. 

 

PHYSICAL EXAMINATION: 

General:  The patient appears cachectic.  He is in no acute distress.  His breathing

is unlabored. 

Vital Signs:  Temperature 98.1, pulse 101 and regular, blood pressure 88/58,

respiratory rate 18 per minute. 

HEENT::  Sclerae anicteric. 

Heart:  Heart sounds S1, S2.  

Lungs:  Scattered rhonchi. 

Abdomen:  Soft.  No tenderness was elicited.  No mass, rebound or rigidity.  

Extremities:  Negative for edema.  

 

IMPRESSION: 

1.  Right upper lobe pneumonia; rule out pulmonary tuberculosis.  

2.  Wasting syndrome. 

 

PLAN: 

1.  Respiratory isolation.

2.  Sputum AFB x3.

3.  Sputum TB PCR.

4.  QuantiFERON.

5.  HIV testing.  

6.  Observe off antibiotic therapy.

 

Thank you for the kind referral. 

 

COLIN OMSQUERA M.D.

 

AYDEN/[id number]

DD: 12/21/2019 15:53

DT: 12/21/2019 17:00

Job #:  [id number]

## 2019-12-27 NOTE — PN
Progress Note, Physician


Chief Complaint: 





NO COMPLAINTS OFFERED BY THE PATIENT





- Current Medication List


Current Medications: 


Active Medications





Acetaminophen (Tylenol -)  650 mg PO Q4H PRN


   PRN Reason: PAIN LEVEL 1-5


   Last Admin: 12/27/19 02:34 Dose:  650 mg


Albuterol/Ipratropium (Duoneb -)  1 amp NEB RTID [facility]


   Last Admin: 12/27/19 08:16 Dose:  1 amp


Heparin Sodium (Porcine) (Heparin -)  5,000 unit SQ BID [facility]


   Last Admin: 12/27/19 10:09 Dose:  Not Given


Insulin Aspart (Novolog Vial Sliding Scale -)  1 vial SQ TIDAC [facility]; Protocol


   Last Admin: 12/27/19 06:27 Dose:  2 units


Insulin Aspart (Novolog Vial Sliding Scale -)  1 vial SQ HS [facility]; Protocol


   Last Admin: 12/26/19 21:58 Dose:  2 unit











- Objective


Vital Signs: 


 Vital Signs











Temperature  98.6 F   12/27/19 10:10


 


Pulse Rate  89   12/27/19 10:10


 


Respiratory Rate  18   12/27/19 10:10


 


Blood Pressure  101/58 L  12/27/19 10:10


 


O2 Sat by Pulse Oximetry (%)  96   12/27/19 09:00











Constitutional: Yes: No Distress


Cardiovascular: Yes: Regular Rate and Rhythm


Respiratory: Yes: Regular


Gastrointestinal: Yes: WNL


Musculoskeletal: Yes: Other


Neurological: Yes: Pre-Existing Deficit


Labs: 


 CBC, BMP





 12/24/19 05:10 





 12/24/19 05:10 











Problem List





- Problems


(1) Right upper lobe consolidation


Code(s): J18.1 - LOBAR PNEUMONIA, UNSPECIFIED ORGANISM   





(2) Smoker


Code(s): F17.200 - NICOTINE DEPENDENCE, UNSPECIFIED, UNCOMPLICATED   





(3) COPD suggested by initial evaluation


Code(s): J44.9 - CHRONIC OBSTRUCTIVE PULMONARY DISEASE, UNSPECIFIED   





Assessment/Plan





PULM/ID CONSULT APPRECIATED


QUANTEFIRON NEGATIVE


AFB PENDING


OOB TO CHAIR


DVT PROPHLAXIS

## 2019-12-27 NOTE — PN
Progress Note, Physician


History of Present Illness: 





pulmonary





alert,comfortable,-sob,less cough





- Current Medication List


Current Medications: 


Active Medications





Acetaminophen (Tylenol -)  650 mg PO Q4H PRN


   PRN Reason: PAIN LEVEL 1-5


   Last Admin: 12/27/19 02:34 Dose:  650 mg


Albuterol/Ipratropium (Duoneb -)  1 amp NEB RTID [facility]


   Last Admin: 12/27/19 08:16 Dose:  1 amp


Heparin Sodium (Porcine) (Heparin -)  5,000 unit SQ BID [facility]


   Last Admin: 12/27/19 10:09 Dose:  Not Given


Insulin Aspart (Novolog Vial Sliding Scale -)  1 vial SQ TIDAC [facility]; Protocol


   Last Admin: 12/27/19 11:24 Dose:  3 units


Insulin Aspart (Novolog Vial Sliding Scale -)  1 vial SQ HS [facility]; Protocol


   Last Admin: 12/26/19 21:58 Dose:  2 unit











- Objective


Vital Signs: 


 Vital Signs











Temperature  98.6 F   12/27/19 10:10


 


Pulse Rate  89   12/27/19 10:10


 


Respiratory Rate  18   12/27/19 10:10


 


Blood Pressure  101/58 L  12/27/19 10:10


 


O2 Sat by Pulse Oximetry (%)  96   12/27/19 09:00











Constitutional: Yes: Calm, Cachectic


Eyes: Yes: WNL


HENT: Yes: WNL


Neck: Yes: WNL


Cardiovascular: Yes: Regular Rate and Rhythm, S1, S2


Respiratory: Yes: Rhonchi (few scattered rhonchi)


Gastrointestinal: Yes: Normal Bowel Sounds, Soft


Extremities: Yes: WNL


Edema: No


Labs: 


 CBC, BMP





 12/24/19 05:10 





Problem List





- Problems


(1) Right upper lobe consolidation


Code(s): J18.1 - LOBAR PNEUMONIA, UNSPECIFIED ORGANISM   





(2) Smoker


Code(s): F17.200 - NICOTINE DEPENDENCE, UNSPECIFIED, UNCOMPLICATED   





(3) Abdominal pain


Code(s): R10.9 - UNSPECIFIED ABDOMINAL PAIN   





(4) Failure to thrive


Code(s): SLB2405 -    


Qualifiers: 


   Failure to thrive age range: in adult   Qualified Code(s): R62.7 - Adult 

failure to thrive   





(5) Moderate malnutrition


Code(s): E44.0 - MODERATE PROTEIN-CALORIE MALNUTRITION   





(6) Moderate protein-calorie malnutrition


Code(s): E44.0 - MODERATE PROTEIN-CALORIE MALNUTRITION   





(7) PNA (pneumonia)


Code(s): J18.9 - PNEUMONIA, UNSPECIFIED ORGANISM   





Assessment/Plan





A/P


Pulmonary Consolidation


r/o TB


COPD


DM


h/o CVA





-  given negative quantiferon, CT findings likely chronic


-  sputum AFB x 3


-  inhaled bronchodilators as needed


-  DVT prophylaxis





DR TALLEY

## 2019-12-28 RX ADMIN — INSULIN ASPART SCH: 100 INJECTION, SOLUTION INTRAVENOUS; SUBCUTANEOUS at 11:06

## 2019-12-28 RX ADMIN — INSULIN ASPART SCH: 100 INJECTION, SOLUTION INTRAVENOUS; SUBCUTANEOUS at 21:39

## 2019-12-28 RX ADMIN — NICOTINE SCH: 21 PATCH TRANSDERMAL at 11:05

## 2019-12-28 RX ADMIN — INSULIN ASPART SCH UNITS: 100 INJECTION, SOLUTION INTRAVENOUS; SUBCUTANEOUS at 17:14

## 2019-12-28 RX ADMIN — Medication SCH ML: at 17:16

## 2019-12-28 RX ADMIN — HEPARIN SODIUM SCH: 5000 INJECTION, SOLUTION INTRAVENOUS; SUBCUTANEOUS at 21:39

## 2019-12-28 RX ADMIN — IPRATROPIUM BROMIDE AND ALBUTEROL SULFATE SCH: .5; 3 SOLUTION RESPIRATORY (INHALATION) at 16:40

## 2019-12-28 RX ADMIN — HEPARIN SODIUM SCH: 5000 INJECTION, SOLUTION INTRAVENOUS; SUBCUTANEOUS at 11:05

## 2019-12-28 RX ADMIN — IPRATROPIUM BROMIDE AND ALBUTEROL SULFATE SCH AMP: .5; 3 SOLUTION RESPIRATORY (INHALATION) at 20:10

## 2019-12-28 RX ADMIN — ACETAMINOPHEN PRN MG: 325 TABLET ORAL at 21:38

## 2019-12-28 RX ADMIN — IPRATROPIUM BROMIDE AND ALBUTEROL SULFATE SCH: .5; 3 SOLUTION RESPIRATORY (INHALATION) at 08:16

## 2019-12-28 RX ADMIN — INSULIN ASPART SCH UNITS: 100 INJECTION, SOLUTION INTRAVENOUS; SUBCUTANEOUS at 06:31

## 2019-12-28 RX ADMIN — MIRTAZAPINE SCH MG: 15 TABLET, FILM COATED ORAL at 21:38

## 2019-12-28 NOTE — PN
Progress Note, Physician


History of Present Illness: 





pulmonary





alert,no distress,less cough,-sob





- Current Medication List


Current Medications: 


Active Medications





Acetaminophen (Tylenol -)  650 mg PO Q4H PRN


   PRN Reason: PAIN LEVEL 1-5


   Last Admin: 12/27/19 02:34 Dose:  650 mg


Albuterol/Ipratropium (Duoneb -)  1 amp NEB RTID [facility]


   Last Admin: 12/28/19 08:16 Dose:  Not Given


Amino Acids (Prosource No Carb Liquid Pkt)  30 ml PO BID@0800,1730 [facility]


Heparin Sodium (Porcine) (Heparin -)  5,000 unit SQ BID [facility]


   Last Admin: 12/28/19 11:05 Dose:  Not Given


Insulin Aspart (Novolog Vial Sliding Scale -)  1 vial SQ TIDAC [facility]; Protocol


   Last Admin: 12/28/19 11:06 Dose:  Not Given


Insulin Aspart (Novolog Vial Sliding Scale -)  1 vial SQ HS [facility]; Protocol


   Last Admin: 12/27/19 21:51 Dose:  2 unit


Mirtazapine (Remeron -)  15 mg PO HS [facility]


Nicotine (Nicoderm Patch -)  21 mg TD DAILY [facility]


   Last Admin: 12/28/19 11:05 Dose:  Not Given











- Objective


Vital Signs: 


 Vital Signs











Temperature  98.1 F   12/28/19 06:00


 


Pulse Rate  84   12/28/19 06:00


 


Respiratory Rate  16   12/28/19 06:00


 


Blood Pressure  100/40 L  12/28/19 06:00


 


O2 Sat by Pulse Oximetry (%)  96   12/27/19 20:31











Constitutional: Yes: Calm, Cachectic


Eyes: Yes: WNL


HENT: Yes: WNL


Neck: Yes: WNL


Cardiovascular: Yes: Regular Rate and Rhythm, S1, S2


Respiratory: Yes: Rhonchi


Gastrointestinal: Yes: Normal Bowel Sounds, Soft


Extremities: Yes: WNL


Edema: No


Labs: 


 CBC, BMP








Problem List





- Problems


(1) Right upper lobe consolidation


Code(s): J18.1 - LOBAR PNEUMONIA, UNSPECIFIED ORGANISM   





(2) Smoker


Code(s): F17.200 - NICOTINE DEPENDENCE, UNSPECIFIED, UNCOMPLICATED   





(3) Abdominal pain


Code(s): R10.9 - UNSPECIFIED ABDOMINAL PAIN   





(4) Failure to thrive


Code(s): TSI6271 -    


Qualifiers: 


   Failure to thrive age range: in adult   Qualified Code(s): R62.7 - Adult 

failure to thrive   





(5) Moderate malnutrition


Code(s): E44.0 - MODERATE PROTEIN-CALORIE MALNUTRITION   





(6) Moderate protein-calorie malnutrition


Code(s): E44.0 - MODERATE PROTEIN-CALORIE MALNUTRITION   





(7) PNA (pneumonia)


Code(s): J18.9 - PNEUMONIA, UNSPECIFIED ORGANISM   





Assessment/Plan





A/P


Pulmonary Consolidation


r/o TB


COPD


DM


h/o CVA





-  given negative quantiferon, CT findings likely chronic


-  sputum AFB x 3


-  inhaled bronchodilators as needed


-  DVT prophylaxis





DR TALLEY

## 2019-12-28 NOTE — PN
Progress Note, Physician


Chief Complaint: 





AWAKE ALERT


WALKING IN HALLWAYS


UPSET AND AGITATED





- Current Medication List


Current Medications: 


Active Medications





Acetaminophen (Tylenol -)  650 mg PO Q4H PRN


   PRN Reason: PAIN LEVEL 1-5


   Last Admin: 12/27/19 02:34 Dose:  650 mg


Albuterol/Ipratropium (Duoneb -)  1 amp NEB RTID [facility]


   Last Admin: 12/28/19 08:16 Dose:  Not Given


Heparin Sodium (Porcine) (Heparin -)  5,000 unit SQ BID [facility]


   Last Admin: 12/27/19 21:48 Dose:  Not Given


Insulin Aspart (Novolog Vial Sliding Scale -)  1 vial SQ TIDAC [facility]; Protocol


   Last Admin: 12/28/19 06:31 Dose:  3 units


Insulin Aspart (Novolog Vial Sliding Scale -)  1 vial SQ HS [facility]; Protocol


   Last Admin: 12/27/19 21:51 Dose:  2 unit











- Objective


Vital Signs: 


 Vital Signs











Temperature  98.1 F   12/28/19 06:00


 


Pulse Rate  84   12/28/19 06:00


 


Respiratory Rate  16   12/28/19 06:00


 


Blood Pressure  100/40 L  12/28/19 06:00


 


O2 Sat by Pulse Oximetry (%)  96   12/27/19 20:31











Constitutional: Yes: Mild Distress


Cardiovascular: Yes: Regular Rate and Rhythm


Respiratory: Yes: Diminished


Gastrointestinal: Yes: WNL


Genitourinary: Yes: WNL


Musculoskeletal: Yes: WNL


Extremities: Yes: WNL


Edema: No


Integumentary: Yes: WNL


Wound/Incision: Yes: Clean/Dry


Neurological: Yes: WNL


Psychiatric: Yes: Agitated


Labs: 


 CBC, BMP





 12/27/19 13:20 





 12/27/19 13:20 











Problem List





- Problems


(1) Right upper lobe consolidation


Code(s): J18.1 - LOBAR PNEUMONIA, UNSPECIFIED ORGANISM   





(2) Smoker


Code(s): F17.200 - NICOTINE DEPENDENCE, UNSPECIFIED, UNCOMPLICATED   





(3) COPD suggested by initial evaluation


Code(s): J44.9 - CHRONIC OBSTRUCTIVE PULMONARY DISEASE, UNSPECIFIED   





Assessment/Plan





PULM/ID CONSULT APPRECIATED


QUANTEFIRON NEGATIVE


AFB PENDING


OOB TO CHAIR


DVT PROPHLAXIS


NICOTINE PATCH ORDERED


REMERON FOR ANXIETY/DEPRESSION/APPETITE STIMULANT

## 2019-12-29 RX ADMIN — ACETAMINOPHEN PRN MG: 325 TABLET ORAL at 12:08

## 2019-12-29 RX ADMIN — HEPARIN SODIUM SCH: 5000 INJECTION, SOLUTION INTRAVENOUS; SUBCUTANEOUS at 21:54

## 2019-12-29 RX ADMIN — IPRATROPIUM BROMIDE AND ALBUTEROL SULFATE SCH AMP: .5; 3 SOLUTION RESPIRATORY (INHALATION) at 08:10

## 2019-12-29 RX ADMIN — ACETAMINOPHEN PRN MG: 325 TABLET ORAL at 21:51

## 2019-12-29 RX ADMIN — Medication SCH ML: at 17:02

## 2019-12-29 RX ADMIN — Medication SCH ML: at 08:59

## 2019-12-29 RX ADMIN — MIRTAZAPINE SCH MG: 15 TABLET, FILM COATED ORAL at 21:51

## 2019-12-29 RX ADMIN — HEPARIN SODIUM SCH: 5000 INJECTION, SOLUTION INTRAVENOUS; SUBCUTANEOUS at 11:55

## 2019-12-29 RX ADMIN — IPRATROPIUM BROMIDE AND ALBUTEROL SULFATE SCH AMP: .5; 3 SOLUTION RESPIRATORY (INHALATION) at 14:33

## 2019-12-29 RX ADMIN — INSULIN ASPART SCH UNITS: 100 INJECTION, SOLUTION INTRAVENOUS; SUBCUTANEOUS at 06:58

## 2019-12-29 RX ADMIN — INSULIN ASPART SCH: 100 INJECTION, SOLUTION INTRAVENOUS; SUBCUTANEOUS at 21:54

## 2019-12-29 RX ADMIN — INSULIN ASPART SCH UNITS: 100 INJECTION, SOLUTION INTRAVENOUS; SUBCUTANEOUS at 11:59

## 2019-12-29 RX ADMIN — IPRATROPIUM BROMIDE AND ALBUTEROL SULFATE SCH AMP: .5; 3 SOLUTION RESPIRATORY (INHALATION) at 20:15

## 2019-12-29 RX ADMIN — NICOTINE SCH: 21 PATCH TRANSDERMAL at 11:56

## 2019-12-29 RX ADMIN — INSULIN ASPART SCH: 100 INJECTION, SOLUTION INTRAVENOUS; SUBCUTANEOUS at 17:00

## 2019-12-29 NOTE — PN
Progress Note, Physician


Chief Complaint: 





ASLEEP


NO ACUTE CHANGES








- Current Medication List


Current Medications: 


Active Medications





Acetaminophen (Tylenol -)  650 mg PO Q4H PRN


   PRN Reason: PAIN LEVEL 1-5


   Last Admin: 12/28/19 21:38 Dose:  650 mg


Albuterol/Ipratropium (Duoneb -)  1 amp NEB RTID [facility]


   Last Admin: 12/29/19 08:10 Dose:  1 amp


Amino Acids (Prosource No Carb Liquid Pkt)  30 ml PO BID@0800,1730 [facility]


   Last Admin: 12/29/19 08:59 Dose:  30 ml


Heparin Sodium (Porcine) (Heparin -)  5,000 unit SQ BID [facility]


   Last Admin: 12/28/19 21:39 Dose:  Not Given


Insulin Aspart (Novolog Vial Sliding Scale -)  1 vial SQ TIDAC [facility]; Protocol


   Last Admin: 12/29/19 06:58 Dose:  3 units


Insulin Aspart (Novolog Vial Sliding Scale -)  1 vial SQ HS [facility]; Protocol


   Last Admin: 12/28/19 21:39 Dose:  Not Given


Mirtazapine (Remeron -)  15 mg PO HS [facility]


   Last Admin: 12/28/19 21:38 Dose:  15 mg


Nicotine (Nicoderm Patch -)  21 mg TD DAILY [facility]


   Last Admin: 12/28/19 11:05 Dose:  Not Given











- Objective


Vital Signs: 


 Vital Signs











Temperature  98 F   12/29/19 06:00


 


Pulse Rate  100 H  12/29/19 06:00


 


Respiratory Rate  18   12/29/19 06:00


 


Blood Pressure  102/66   12/29/19 06:00


 


O2 Sat by Pulse Oximetry (%)  97   12/28/19 21:00











Constitutional: Yes: No Distress


Cardiovascular: Yes: Regular Rate and Rhythm


Respiratory: Yes: Diminished


Musculoskeletal: Yes: WNL


Psychiatric: Yes: Other (ANXIOUS)


Labs: 


 CBC, BMP





 12/27/19 13:20 





 12/27/19 13:20 











Problem List





- Problems


(1) Right upper lobe consolidation


Code(s): J18.1 - LOBAR PNEUMONIA, UNSPECIFIED ORGANISM   





(2) Smoker


Code(s): F17.200 - NICOTINE DEPENDENCE, UNSPECIFIED, UNCOMPLICATED   





(3) COPD suggested by initial evaluation


Code(s): J44.9 - CHRONIC OBSTRUCTIVE PULMONARY DISEASE, UNSPECIFIED   





Assessment/Plan





PULM/ID CONSULT APPRECIATED


QUANTEFIRON NEGATIVE


AFB PRELIM X1 NEGATIVE


OOB TO CHAIR


DVT PROPHLAXIS


NICOTINE PATCH ORDERED


REMERON FOR ANXIETY/DEPRESSION/APPETITE STIMULANT

## 2019-12-29 NOTE — PN
Progress Note, Physician


History of Present Illness: 





pulmonary





alert,comfortable,-sob,+ cough





- Current Medication List


Current Medications: 


Active Medications





Acetaminophen (Tylenol -)  650 mg PO Q4H PRN


   PRN Reason: PAIN LEVEL 1-5


   Last Admin: 12/28/19 21:38 Dose:  650 mg


Albuterol/Ipratropium (Duoneb -)  1 amp NEB RTID [facility]


   Last Admin: 12/29/19 08:10 Dose:  1 amp


Amino Acids (Prosource No Carb Liquid Pkt)  30 ml PO BID@0800,1730 [facility]


   Last Admin: 12/29/19 08:59 Dose:  30 ml


Heparin Sodium (Porcine) (Heparin -)  5,000 unit SQ BID [facility]


   Last Admin: 12/28/19 21:39 Dose:  Not Given


Insulin Aspart (Novolog Vial Sliding Scale -)  1 vial SQ TIDAC [facility]; Protocol


   Last Admin: 12/29/19 06:58 Dose:  3 units


Insulin Aspart (Novolog Vial Sliding Scale -)  1 vial SQ HS [facility]; Protocol


   Last Admin: 12/28/19 21:39 Dose:  Not Given


Mirtazapine (Remeron -)  15 mg PO HS [facility]


   Last Admin: 12/28/19 21:38 Dose:  15 mg


Nicotine (Nicoderm Patch -)  21 mg TD DAILY [facility]


   Last Admin: 12/28/19 11:05 Dose:  Not Given











- Objective


Vital Signs: 


 Vital Signs











Temperature  98 F   12/29/19 06:00


 


Pulse Rate  100 H  12/29/19 06:00


 


Respiratory Rate  18   12/29/19 06:00


 


Blood Pressure  102/66   12/29/19 06:00


 


O2 Sat by Pulse Oximetry (%)  96   12/29/19 11:27











Constitutional: Yes: Calm, Cachectic


Eyes: Yes: WNL


HENT: Yes: WNL


Neck: Yes: WNL


Cardiovascular: Yes: Regular Rate and Rhythm, S1, S2


Respiratory: Yes: Rhonchi (few rhonchi)


Gastrointestinal: Yes: Normal Bowel Sounds, Soft


Extremities: Yes: WNL


Edema: No


Labs: 


 CBC, BMP








Problem List





- Problems


(1) Right upper lobe consolidation


Code(s): J18.1 - LOBAR PNEUMONIA, UNSPECIFIED ORGANISM   





(2) Smoker


Code(s): F17.200 - NICOTINE DEPENDENCE, UNSPECIFIED, UNCOMPLICATED   





(3) Abdominal pain


Code(s): R10.9 - UNSPECIFIED ABDOMINAL PAIN   





(4) Failure to thrive


Code(s): [id number] -    


Qualifiers: 


   Failure to thrive age range: in adult   Qualified Code(s): R62.7 - Adult 

failure to thrive   





(5) Moderate malnutrition


Code(s): E44.0 - MODERATE PROTEIN-CALORIE MALNUTRITION   





(6) Moderate protein-calorie malnutrition


Code(s): E44.0 - MODERATE PROTEIN-CALORIE MALNUTRITION   





(7) PNA (pneumonia)


Code(s): J18.9 - PNEUMONIA, UNSPECIFIED ORGANISM   





Assessment/Plan





A/P


Pulmonary Consolidation


r/o TB


COPD


DM


h/o CVA





-  given negative quantiferon, CT findings likely chronic


-  sputum AFB x 2 negative


-  inhaled bronchodilators as needed


-  DVT prophylaxis





DR TALLEY

## 2019-12-30 VITALS — TEMPERATURE: 97.9 F

## 2019-12-30 VITALS — HEART RATE: 119 BPM | DIASTOLIC BLOOD PRESSURE: 55 MMHG | SYSTOLIC BLOOD PRESSURE: 104 MMHG

## 2019-12-30 RX ADMIN — IPRATROPIUM BROMIDE AND ALBUTEROL SULFATE SCH: .5; 3 SOLUTION RESPIRATORY (INHALATION) at 08:05

## 2019-12-30 RX ADMIN — NICOTINE SCH: 21 PATCH TRANSDERMAL at 10:55

## 2019-12-30 RX ADMIN — INSULIN ASPART SCH UNITS: 100 INJECTION, SOLUTION INTRAVENOUS; SUBCUTANEOUS at 06:27

## 2019-12-30 RX ADMIN — INSULIN ASPART SCH UNITS: 100 INJECTION, SOLUTION INTRAVENOUS; SUBCUTANEOUS at 11:22

## 2019-12-30 RX ADMIN — Medication SCH: at 08:41

## 2019-12-30 RX ADMIN — HEPARIN SODIUM SCH: 5000 INJECTION, SOLUTION INTRAVENOUS; SUBCUTANEOUS at 10:55

## 2019-12-30 RX ADMIN — Medication SCH: at 18:11

## 2019-12-30 RX ADMIN — INSULIN ASPART SCH: 100 INJECTION, SOLUTION INTRAVENOUS; SUBCUTANEOUS at 18:11

## 2019-12-30 NOTE — DS
Physical Examination


Vital Signs: 


 Vital Signs











Temperature  97.9 F   12/30/19 14:09


 


Pulse Rate  119 H  12/30/19 14:09


 


Respiratory Rate  18   12/30/19 14:09


 


Blood Pressure  104/55 L  12/30/19 14:09


 


O2 Sat by Pulse Oximetry (%)  98   12/30/19 10:00











Findings/Remarks: 





afb x 3 neg


Labs: 


 CBC, BMP





 12/27/19 13:20 





 12/27/19 13:20 











Discharge Summary


Problems reviewed: Yes


Reason For Visit: TUBERCULOSIS


Current Active Problems





Evaluation by medical service required (Acute)


Right upper lobe consolidation (Acute)


Smoker (Acute)








Hospital Course: 


TB workup negative


Condition: Good





- Instructions


Diet, Activity, Other Instructions: 


see dr parikh in 1 week


Referrals: 


Rony Parikh MD [Primary Care Provider] - 


Disposition: HOME





- Home Medications


Comprehensive Discharge Medication List: 


Ambulatory Orders





Acetaminophen [Tylenol .Regular Strength -] 650 mg PO Q4H PRN  tablet 12/30/19 


Albuterol 2.5/Ipratropium 0.5 [Duoneb -] 1 amp NEB RTID #120 amp 12/30/19 


Mirtazapine [Remeron -] 15 mg PO HS #30 tablet 12/30/19 


Nicotine Patch [Nicoderm Patch -] 21 mg TD DAILY #30 patch 12/30/19 








Prescription Drug Monitoring Program (I-STOP) results: I-STOP not reviewed

## 2019-12-30 NOTE — PN
Progress Note, Physician


History of Present Illness: 





pulmonary





alert comfortable ,-resp distress





- Current Medication List


Current Medications: 


Active Medications





Acetaminophen (Tylenol -)  650 mg PO Q4H PRN


   PRN Reason: PAIN LEVEL 1-5


   Last Admin: 12/29/19 21:51 Dose:  650 mg


Albuterol/Ipratropium (Duoneb -)  1 amp NEB RTID [facility]


   Last Admin: 12/30/19 08:05 Dose:  Not Given


Amino Acids (Prosource No Carb Liquid Pkt)  30 ml PO BID@0800,1730 [facility]


   Last Admin: 12/30/19 08:41 Dose:  Not Given


Heparin Sodium (Porcine) (Heparin -)  5,000 unit SQ BID [facility]


   Last Admin: 12/29/19 21:54 Dose:  Not Given


Insulin Aspart (Novolog Vial Sliding Scale -)  1 vial SQ TIDAC [facility]; Protocol


   Last Admin: 12/30/19 06:27 Dose:  3 units


Insulin Aspart (Novolog Vial Sliding Scale -)  1 vial SQ HS [facility]; Protocol


   Last Admin: 12/29/19 21:54 Dose:  Not Given


Mirtazapine (Remeron -)  15 mg PO HS [facility]


   Last Admin: 12/29/19 21:51 Dose:  15 mg


Nicotine (Nicoderm Patch -)  21 mg TD DAILY [facility]


   Last Admin: 12/29/19 11:56 Dose:  Not Given











- Objective


Vital Signs: 


 Vital Signs











Temperature  97.7 F   12/30/19 06:00


 


Pulse Rate  97 H  12/30/19 06:00


 


Respiratory Rate  18   12/30/19 06:00


 


Blood Pressure  106/65   12/30/19 06:00


 


O2 Sat by Pulse Oximetry (%)  98   12/29/19 21:00











Constitutional: Yes: Calm, Cachectic


Eyes: Yes: WNL


HENT: Yes: WNL


Neck: Yes: WNL


Cardiovascular: Yes: Regular Rate and Rhythm, S1, S2


Respiratory: Yes: Rhonchi (few rhonchi)


Gastrointestinal: Yes: Normal Bowel Sounds, Soft


Extremities: Yes: WNL


Edema: No


Labs: 


 





Problem List





- Problems


(1) Right upper lobe consolidation


Code(s): J18.1 - LOBAR PNEUMONIA, UNSPECIFIED ORGANISM   





(2) Smoker


Code(s): F17.200 - NICOTINE DEPENDENCE, UNSPECIFIED, UNCOMPLICATED   





(3) Abdominal pain


Code(s): R10.9 - UNSPECIFIED ABDOMINAL PAIN   





(4) Failure to thrive


Code(s): [id number] -    


Qualifiers: 


   Failure to thrive age range: in adult   Qualified Code(s): R62.7 - Adult 

failure to thrive   





(5) Moderate malnutrition


Code(s): E44.0 - MODERATE PROTEIN-CALORIE MALNUTRITION   





(6) Moderate protein-calorie malnutrition


Code(s): E44.0 - MODERATE PROTEIN-CALORIE MALNUTRITION   





(7) PNA (pneumonia)


Code(s): J18.9 - PNEUMONIA, UNSPECIFIED ORGANISM   





Assessment/Plan





A/P


Pulmonary Consolidation


r/o TB


COPD


DM


h/o CVA





-  given negative quantiferon, CT findings likely chronic


-  sputum AFB x 2 negative


-  inhaled bronchodilators as needed


-  DVT prophylaxis





DR TALLEY

## 2019-12-30 NOTE — PN
Progress Note, Physician


Chief Complaint: 





AWAKE WALKING IN HALLWAY WITH MASK





- Current Medication List


Current Medications: 


Active Medications





Acetaminophen (Tylenol -)  650 mg PO Q4H PRN


   PRN Reason: PAIN LEVEL 1-5


   Last Admin: 12/29/19 21:51 Dose:  650 mg


Albuterol/Ipratropium (Duoneb -)  1 amp NEB RTID [facility]


   Last Admin: 12/30/19 08:05 Dose:  Not Given


Amino Acids (Prosource No Carb Liquid Pkt)  30 ml PO BID@0800,1730 [facility]


   Last Admin: 12/29/19 17:02 Dose:  30 ml


Heparin Sodium (Porcine) (Heparin -)  5,000 unit SQ BID [facility]


   Last Admin: 12/29/19 21:54 Dose:  Not Given


Insulin Aspart (Novolog Vial Sliding Scale -)  1 vial SQ TIDAC [facility]; Protocol


   Last Admin: 12/30/19 06:27 Dose:  3 units


Insulin Aspart (Novolog Vial Sliding Scale -)  1 vial SQ HS [facility]; Protocol


   Last Admin: 12/29/19 21:54 Dose:  Not Given


Mirtazapine (Remeron -)  15 mg PO HS [facility]


   Last Admin: 12/29/19 21:51 Dose:  15 mg


Nicotine (Nicoderm Patch -)  21 mg TD DAILY [facility]


   Last Admin: 12/29/19 11:56 Dose:  Not Given











- Objective


Vital Signs: 


 Vital Signs











Temperature  97.7 F   12/30/19 06:00


 


Pulse Rate  97 H  12/30/19 06:00


 


Respiratory Rate  18   12/30/19 06:00


 


Blood Pressure  106/65   12/30/19 06:00


 


O2 Sat by Pulse Oximetry (%)  98   12/29/19 21:00











Constitutional: Yes: No Distress


Cardiovascular: Yes: Regular Rate and Rhythm


Respiratory: Yes: Diminished


Gastrointestinal: Yes: WNL


Genitourinary: Yes: WNL


Neurological: Yes: WNL


Psychiatric: Yes: Other


Labs: 


 CBC, BMP





 12/27/19 13:20 





 12/27/19 13:20 











Problem List





- Problems


(1) Right upper lobe consolidation


Code(s): J18.1 - LOBAR PNEUMONIA, UNSPECIFIED ORGANISM   





(2) Smoker


Code(s): F17.200 - NICOTINE DEPENDENCE, UNSPECIFIED, UNCOMPLICATED   





(3) COPD suggested by initial evaluation


Code(s): J44.9 - CHRONIC OBSTRUCTIVE PULMONARY DISEASE, UNSPECIFIED   





Assessment/Plan





PULM/ID CONSULT APPRECIATED


QUANTEFIRON NEGATIVE


AFB PRELIM X1 NEGATIVE


OOB TO CHAIR


DVT PROPHLAXIS


NICOTINE PATCH ORDERED


REMERON FOR ANXIETY/DEPRESSION/APPETITE STIMULANT

## 2019-12-31 NOTE — PN
Progress Note (short form)





- Note


Progress Note: 





ADDENDUM NOTE


DIAGNOSIS


SEVERE MALNUTRITION IN THE SEETING OF DIABETIC PATIENT WITH WEIGHT LOSS AND TB 

WORKUP ASPEN CRITERIA 17.7 BMI





Problem List





- Problems


(1) Right upper lobe consolidation


Code(s): J18.1 - LOBAR PNEUMONIA, UNSPECIFIED ORGANISM   





(2) Smoker


Code(s): F17.200 - NICOTINE DEPENDENCE, UNSPECIFIED, UNCOMPLICATED   





(3) COPD suggested by initial evaluation


Code(s): J44.9 - CHRONIC OBSTRUCTIVE PULMONARY DISEASE, UNSPECIFIED

## 2020-03-20 ENCOUNTER — HOSPITAL ENCOUNTER (INPATIENT)
Dept: HOSPITAL 74 - JER | Age: 70
LOS: 2 days | Discharge: HOME | DRG: 191 | End: 2020-03-22
Attending: NURSE PRACTITIONER | Admitting: STUDENT IN AN ORGANIZED HEALTH CARE EDUCATION/TRAINING PROGRAM
Payer: COMMERCIAL

## 2020-03-20 VITALS — BODY MASS INDEX: 19.7 KG/M2

## 2020-03-20 DIAGNOSIS — J44.1: Primary | ICD-10-CM
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
## 2020-03-20 DIAGNOSIS — F17.210: ICD-10-CM

## 2020-03-20 DIAGNOSIS — S42.201A: ICD-10-CM
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
## 2020-03-20 DIAGNOSIS — E11.65: ICD-10-CM

## 2020-03-20 DIAGNOSIS — R91.1: ICD-10-CM

## 2020-03-20 LAB
ALBUMIN SERPL-MCNC: 3.5 G/DL (ref 3.4–5)
ALP SERPL-CCNC: 109 U/L (ref 45–117)
ALT SERPL-CCNC: 25 U/L (ref 13–61)
ANION GAP SERPL CALC-SCNC: 13 MMOL/L (ref 8–16)
AST SERPL-CCNC: 65 U/L (ref 15–37)
BASOPHILS # BLD: 0.8 % (ref 0–2)
BILIRUB SERPL-MCNC: 1 MG/DL (ref 0.2–1)
BNP SERPL-MCNC: 63.8 PG/ML (ref 5–125)
BUN SERPL-MCNC: 27.7 MG/DL (ref 7–18)
CALCIUM SERPL-MCNC: 9.1 MG/DL (ref 8.5–10.1)
CHLORIDE SERPL-SCNC: 101 MMOL/L (ref 98–107)
CO2 SERPL-SCNC: 21 MMOL/L (ref 21–32)
CREAT SERPL-MCNC: 0.8 MG/DL (ref 0.55–1.3)
DEPRECATED RDW RBC AUTO: 13.4 % (ref 11.9–15.9)
EOSINOPHIL # BLD: 0 % (ref 0–4.5)
GLUCOSE SERPL-MCNC: 129 MG/DL (ref 74–106)
HCT VFR BLD CALC: 38.6 % (ref 35.4–49)
HGB BLD-MCNC: 12.8 GM/DL (ref 11.7–16.9)
INR BLD: 1.28 (ref 0.83–1.09)
LYMPHOCYTES # BLD: 6.3 % (ref 8–40)
MCH RBC QN AUTO: 30.4 PG (ref 25.7–33.7)
MCHC RBC AUTO-ENTMCNC: 33.1 G/DL (ref 32–35.9)
MCV RBC: 92 FL (ref 80–96)
MONOCYTES # BLD AUTO: 8 % (ref 3.8–10.2)
NEUTROPHILS # BLD: 84.9 % (ref 42.8–82.8)
PLATELET # BLD AUTO: 246 K/MM3 (ref 134–434)
PMV BLD: 8.7 FL (ref 7.5–11.1)
POTASSIUM SERPLBLD-SCNC: 4.3 MMOL/L (ref 3.5–5.1)
PROT SERPL-MCNC: 8.2 G/DL (ref 6.4–8.2)
PT PNL PPP: 15.1 SEC (ref 9.7–13)
RBC # BLD AUTO: 4.2 M/MM3 (ref 4–5.6)
SODIUM SERPL-SCNC: 135 MMOL/L (ref 136–145)
WBC # BLD AUTO: 11.5 K/MM3 (ref 4–10)

## 2020-03-20 RX ADMIN — ALBUTEROL SULFATE SCH PUFF: 90 AEROSOL, METERED RESPIRATORY (INHALATION) at 21:00

## 2020-03-20 RX ADMIN — FOLIC ACID SCH MG: 1 TABLET ORAL at 21:00

## 2020-03-20 RX ADMIN — LIDOCAINE SCH PATCH: 50 PATCH TOPICAL at 21:00

## 2020-03-20 NOTE — HP
CHIEF COMPLAINT:  short of breath and right upper arm shoulder pain





PCP: unknown





HISTORY OF PRESENT ILLNESS:


Patient is 69 year old male with a significant past medical history of COPD.  

Patient also reports having right shoulder pain which he states he sustained 

after being kicked out of [facility] yesterday.  Patient denies falls.  He

is a poor historian and unable to provide full past medical history.  History 

taken from chart and ED signout.  Patient complained of body aches, shortness of

breath and general malaise. No recent travel, no recent sick contact, no cough. 

He is being ruled out for possible COVID 19 infection at this time.   He will be

isolated.  





He was given Solumedrol 125 in the ED with relief of symptoms.  Currently 

tolerating room air and denies any shortness of breath. 





ER course was notable for:


(1) no signs of acute alcohol withdrawal


(2) right shoulder xray with acute humerus fracture, sling ordered


(3) isolation covid 19, but seems low risk.  will defer to pulmonary





Recent Travel:  none 





PAST MEDICAL HISTORY: copd





PAST SURGICAL HISTORY: denies 





Social History:


Smokin cigarrettes per day, then denied smoking 


Alcohol: 5 beers daily


Drugs: denies





Allergies





No Known Allergies Allergy (Verified 20 15:00)


   








HOME MEDICATIONS:


 








PHYSICAL EXAMINATION


                               Vital Signs - 24 hr











  20





  14:54 15:02 16:13


 


Temperature 99.3 F  


 


Pulse Rate 120 H  


 


Pulse Rate [   100 H





Apical]   


 


Respiratory 26 H  20





Rate   


 


Blood Pressure 126/73  


 


Blood Pressure   119/71





[Left Arm]   


 


O2 Sat by Pulse 98 98 98





Oximetry (%)   














  20





  17:00


 


Temperature 98.5 F


 


Pulse Rate 


 


Pulse Rate [ 110 H





Apical] 


 


Respiratory 22 H





Rate 


 


Blood Pressure 


 


Blood Pressure 126/70





[Left Arm] 


 


O2 Sat by Pulse 98





Oximetry (%) 








GENERAL: Awake, alert, and fully oriented, in no acute distress. Lithuanian 

speaking primarily


HEAD: Normal with no signs of trauma.


EYES: Pupils equal, round and reactive to light, extraocular movements intact, 

sclera anicteric, conjunctiva clear. No lid lag.


EARS, NOSE, THROAT: Ears normal, nares patent, oropharynx clear without 

exudates. Moist mucous membranes.


NECK: Normal range of motion, supple without lymphadenopathy, JVD, or masses.


LUNGS: diminished, no wheezing, tolerating room air


HEART: Regular rate and rhythm 


ABDOMEN: Soft, nontender, not distended, normoactive bowel sounds 


MUSCULOSKELETAL:  No bony deformities or tenderness. No CVA tenderness.


UPPER EXTREMITIES:   No peripheral edema.


LOWER EXTREMITIES:   No peripheral edema. 


NEUROLOGICAL:    Normal speech. Normal gait.


PSYCHIATRIC: Cooperative. Good eye contact. Appropriate mood and affect.


SKIN: Warm, dry, normal turgor, no rashes or lesions noted, normal capillary 

refill. 


                         Laboratory Results - last 24 hr











  20





  14:10 14:10 14:15


 


WBC    11.5 H


 


RBC    4.20


 


Hgb    12.8


 


Hct    38.6


 


MCV    92.0


 


MCH    30.4


 


MCHC    33.1


 


RDW    13.4


 


Plt Count    246


 


MPV    8.7


 


Absolute Neuts (auto)    9.7 H


 


Neutrophils %    84.9 H D


 


Lymphocytes %    6.3 L D


 


Monocytes %    8.0


 


Eosinophils %    0.0  D


 


Basophils %    0.8


 


Nucleated RBC %    0


 


PT with INR   15.10 H 


 


INR   1.28 H 


 


Sodium   


 


Potassium   


 


Chloride   


 


Carbon Dioxide   


 


Anion Gap   


 


BUN   


 


Creatinine   


 


Est GFR (CKD-EPI)AfAm   


 


Est GFR (CKD-EPI)NonAf   


 


Random Glucose   


 


Lactic Acid   


 


Calcium   


 


Total Bilirubin   


 


AST   


 


ALT   


 


Alkaline Phosphatase   


 


Creatine Kinase   


 


Creatine Kinase Index   


 


CK-MB (CK-2)   


 


Troponin I   


 


B-Natriuretic Peptide   


 


Total Protein   


 


Albumin   


 


Influenza A (Rapid)   


 


Influenza B (Rapid)   


 


Blood Type  A POSITIVE  


 


Antibody Screen  Negative  














  20





  14:15 14:15 14:15


 


WBC   


 


RBC   


 


Hgb   


 


Hct   


 


MCV   


 


MCH   


 


MCHC   


 


RDW   


 


Plt Count   


 


MPV   


 


Absolute Neuts (auto)   


 


Neutrophils %   


 


Lymphocytes %   


 


Monocytes %   


 


Eosinophils %   


 


Basophils %   


 


Nucleated RBC %   


 


PT with INR   


 


INR   


 


Sodium   135 L 


 


Potassium   4.3 


 


Chloride   101 


 


Carbon Dioxide   21 


 


Anion Gap   13 


 


BUN   27.7 H 


 


Creatinine   0.8 


 


Est GFR (CKD-EPI)AfAm   104.90 


 


Est GFR (CKD-EPI)NonAf   90.51 


 


Random Glucose   129 H 


 


Lactic Acid    1.7


 


Calcium   9.1 


 


Total Bilirubin   1.0 


 


AST   65 H 


 


ALT   25 


 


Alkaline Phosphatase   109 


 


Creatine Kinase   1159 H 


 


Creatine Kinase Index   1.3 


 


CK-MB (CK-2)   15.5 H 


 


Troponin I   < 0.02 


 


B-Natriuretic Peptide   63.8 


 


Total Protein   8.2 


 


Albumin   3.5 


 


Influenza A (Rapid)  Negative  


 


Influenza B (Rapid)  Negative  


 


Blood Type   


 


Antibody Screen   











ASSESSMENT/PLAN:








Family Medical History


Family History: Unable to Obtain





Problem List





- Problem


(1) COPD exacerbation


Assessment/Plan: 


patient wheezing on presentation to the ED and given solumedrol 125mg once


will defer on further steriods at this time as he appears comfortable and is not

wheezing


tolerating room air


will order prn oxygen as needed and scheduled duonebs


pulmonary consulted


on azithromcycin emperically


Code(s): J44.1 - CHRONIC OBSTRUCTIVE PULMONARY DISEASE W (ACUTE) EXACERBATION   





(2) Fx humeral neck


Assessment/Plan: 


for ortho to see


place on a sling


pain management with oxycodone


Code(s): S42.213A - UNSP DISP FX OF SURGICAL NECK OF UNSP HUMERUS, INIT   





(3) Shortness of breath


Assessment/Plan: 


resolved, tolerating room air


Code(s): R06.02 - SHORTNESS OF BREATH   





(4) Poorly controlled diabetes mellitus


Assessment/Plan: 


hmga1c in a.m. 


Code(s): E11.65 - TYPE 2 DIABETES MELLITUS WITH HYPERGLYCEMIA   





(5) Prophylactic measure


Assessment/Plan: 


fen


tolerating po


monitor electrolytes





full code


heparin as dvt prophy


Code(s): Z29.9 - ENCOUNTER FOR PROPHYLACTIC MEASURES, UNSPECIFIED   





Visit type





- Emergency Visit


Emergency Visit: Yes


ED Registration Date: 20


Care time: The patient presented to the Emergency Department on the above date 

and was hospitalized for further evaluation of their emergent condition.





- New Patient


This patient is new to me today: No





- Critical Care


Critical Care patient: No

## 2020-03-20 NOTE — PDOC
History of Present Illness





- General


Chief Complaint: Shortness of Breath


Stated Complaint: RESPIRATORY PROBLEM


History Source: Patient


Exam Limitations: No Limitations





- History of Present Illness


Initial Comments: 





03/20/20 15:00


68 yo no known pmh lives alone here with c/o sob. and shoulder pain. pt is poor 

historian, unable to provide details about what hapened to his shoulder. caled 

EMS because he was in pain. states he has whole body pain specifically right 

shouler pain and is sob.  no known details of trauma. no f/c does c/o chills. no

cp does feel sob. per EMS pt ws seen at [facility] yesterday, unclear of what 

happened and pt is unable to recal why he was there. 





Past History





- Past Medical History


Allergies/Adverse Reactions: 


                                    Allergies











Allergy/AdvReac Type Severity Reaction Status Date / Time


 


No Known Allergies Allergy   Verified 03/20/20 15:00











CVA: Yes


COPD: Yes


Diabetes: Yes


HTN: No (hypotension)





- Psycho Social/Smoking Cessation Hx


Smoking History: Current every day smoker


Have you smoked in the past 12 months: Yes


Number of Cigarettes Smoked Daily: 5


Information on smoking cessation initiated: Yes


'Breaking Loose' booklet given: 12/20/19


Hx Alcohol Use: No (denies)


Drug/Substance Use Hx: No (denies)





**Review of Systems





- Review of Systems


Constitutional: No: Chills, Diaphoresis


Respiratory: Yes: Shortness of Breath.  No: Cough, Orthopnea


Cardiac (ROS): No: Chest Pain


Musculoskeletal: Yes: Joint Pain.  No: Neck Pain


Integumentary: Yes: Bruising, Other (right shoulder)


All Other Systems: Reviewed and Negative





*Physical Exam





- Vital Signs


                                Last Vital Signs











Temp Pulse Resp BP Pulse Ox


 


 99.3 F   120 H  26 H  126/73   98 


 


 03/20/20 14:54  03/20/20 14:54  03/20/20 14:54  03/20/20 14:54  03/20/20 14:54














- Physical Exam





03/20/20 15:02


awake alert lungs with decreased breath sounds bilat bases. increased work of 

breathing tachypnea. heart reg tachycardia.abd soft thin nontenderness. ext 

thin.  right shoulder eccymotic, ttp. swollen, decreased rom secondary to pain. 





ED Treatment Course





- LABORATORY


CBC & Chemistry Diagram: 


                                 03/20/20 14:15





                                 03/20/20 14:15





- RADIOLOGY


Radiology Studies Ordered: 














 Category Date Time Status


 


 CHEST X-RAY PORTABLE* [RAD] [facility]  03/20/20 14:59 Ordered


 


 SHOULDER-RIGHT [RAD] [facility]  03/20/20 14:59 Ordered














Medical Decision Making





- Medical Decision Making





03/20/20 15:04


68 yo unknown past med history, denies all, with right shoulder trauma, fever 

sob .


differential trauma rib fx, shoulder fx. head trauma. infection such as pna, or 

aspiration, rhabdo. mi. plan labs cxr ct head neck. 


due to sob and fever,considered for COVID. plan influenza and covid swabs sent. 


03/20/20 15:10


called [facility] for clarification as pt unable to provide history and cant 

recollect what happened.  per ED attending. pt ws brought in off street by EMS 

for etoh intoxication, registered  [id number],  and was discharged later in evening 

when clinically sober. per their chart review no imaging was performed at this 

time. 


03/20/20 15:17





03/20/20 16:27


pt with normal cxr hyperinflated.  ct head negative ct cervical spine no 

fracture.  right shoulder with humeral neck fx.  plan pt to be admitted r/o 

covid with sob. 


03/20/20 16:32


chart review pt h/o copd, chip given inhaler ( duoneb avoided due to concerns for

 COvid)  also given steroids. due to fever given ceftriaxone and azithromycin. 

steroids ordered. DR boudreaux / richard paged for right humeral neck fx. 


03/20/20 17:06


pt ct with plueral thickening scarring most right apex, similar to prior but 

slightly worse, questionable right middle lobe infiltrate.


given ceftriaxone and azithromycin.


dr asif/ tomas paged, awaiting call back. no call back at this time will 

microblog hospitalist for admission. 


03/20/20 17:08


d/w orthopedic PA, recommend Sling. given to patient, and will see inpatient. 





Discharge





- Discharge Information


Problems reviewed: Yes


Clinical Impression/Diagnosis: 


 Shortness of breath, COPD exacerbation, Fx humeral neck





Condition: Good





- Admission


Yes





- Follow up/Referral





- Patient Discharge Instructions





- Post Discharge Activity

## 2020-03-21 LAB
ALBUMIN SERPL-MCNC: 2.9 G/DL (ref 3.4–5)
ALP SERPL-CCNC: 89 U/L (ref 45–117)
ALT SERPL-CCNC: 21 U/L (ref 13–61)
ANION GAP SERPL CALC-SCNC: 8 MMOL/L (ref 8–16)
AST SERPL-CCNC: 41 U/L (ref 15–37)
BILIRUB SERPL-MCNC: 1 MG/DL (ref 0.2–1)
BUN SERPL-MCNC: 23.4 MG/DL (ref 7–18)
CALCIUM SERPL-MCNC: 8.2 MG/DL (ref 8.5–10.1)
CHLORIDE SERPL-SCNC: 102 MMOL/L (ref 98–107)
CO2 SERPL-SCNC: 25 MMOL/L (ref 21–32)
CREAT SERPL-MCNC: 0.7 MG/DL (ref 0.55–1.3)
DEPRECATED RDW RBC AUTO: 13.2 % (ref 11.9–15.9)
GLUCOSE SERPL-MCNC: 313 MG/DL (ref 74–106)
HCT VFR BLD CALC: 33.6 % (ref 35.4–49)
HGB BLD-MCNC: 11.1 GM/DL (ref 11.7–16.9)
MAGNESIUM SERPL-MCNC: 2.2 MG/DL (ref 1.8–2.4)
MCH RBC QN AUTO: 30.3 PG (ref 25.7–33.7)
MCHC RBC AUTO-ENTMCNC: 32.9 G/DL (ref 32–35.9)
MCV RBC: 92.2 FL (ref 80–96)
PHOSPHATE SERPL-MCNC: 3 MG/DL (ref 2.5–4.9)
PLATELET # BLD AUTO: 218 K/MM3 (ref 134–434)
PMV BLD: 9 FL (ref 7.5–11.1)
POTASSIUM SERPLBLD-SCNC: 4.9 MMOL/L (ref 3.5–5.1)
PROT SERPL-MCNC: 7.1 G/DL (ref 6.4–8.2)
RBC # BLD AUTO: 3.65 M/MM3 (ref 4–5.6)
SODIUM SERPL-SCNC: 134 MMOL/L (ref 136–145)
WBC # BLD AUTO: 8.8 K/MM3 (ref 4–10)

## 2020-03-21 RX ADMIN — SODIUM CHLORIDE SCH MLS/HR: 9 INJECTION, SOLUTION INTRAVENOUS at 10:25

## 2020-03-21 RX ADMIN — Medication SCH EACH: at 07:00

## 2020-03-21 RX ADMIN — INSULIN ASPART SCH UNITS: 100 INJECTION, SOLUTION INTRAVENOUS; SUBCUTANEOUS at 21:35

## 2020-03-21 RX ADMIN — INSULIN ASPART SCH UNITS: 100 INJECTION, SOLUTION INTRAVENOUS; SUBCUTANEOUS at 18:04

## 2020-03-21 RX ADMIN — ALBUTEROL SULFATE SCH PUFF: 90 AEROSOL, METERED RESPIRATORY (INHALATION) at 21:35

## 2020-03-21 RX ADMIN — ALBUTEROL SULFATE SCH PUFF: 90 AEROSOL, METERED RESPIRATORY (INHALATION) at 09:25

## 2020-03-21 RX ADMIN — HEPARIN SODIUM SCH UNIT: 5000 INJECTION, SOLUTION INTRAVENOUS; SUBCUTANEOUS at 21:14

## 2020-03-21 RX ADMIN — ALBUTEROL SULFATE SCH PUFF: 90 AEROSOL, METERED RESPIRATORY (INHALATION) at 13:25

## 2020-03-21 RX ADMIN — FOLIC ACID SCH MG: 1 TABLET ORAL at 09:48

## 2020-03-21 RX ADMIN — SODIUM CHLORIDE SCH MLS/HR: 9 INJECTION, SOLUTION INTRAVENOUS at 23:40

## 2020-03-21 RX ADMIN — ALBUTEROL SULFATE SCH PUFF: 90 AEROSOL, METERED RESPIRATORY (INHALATION) at 18:04

## 2020-03-21 RX ADMIN — LIDOCAINE SCH PATCH: 50 PATCH TOPICAL at 18:04

## 2020-03-21 RX ADMIN — INSULIN ASPART SCH UNITS: 100 INJECTION, SOLUTION INTRAVENOUS; SUBCUTANEOUS at 13:26

## 2020-03-21 NOTE — CON.PULM
Consult


Consult Specialty:: PULMONARY


Referred by:: DOUGIE Mathis


Reason for Consultation:: r/o COVID





- History of Present Illness


Chief Complaint: shoulder pain


History of Present Illness: 





70yo male with h/o COPD, former smoker who was admitted with shoulder pain for 

which he is unable to explain what happened. Also had reported shortness of 

breath which has now resolved. No fevers, chills or sweats. No cough or 

wheezing. No headache, nausea or vomiting. CT chest showing chronic changes.








- History Source


History Provided By: Patient, Medical Record


Limitations to Obtaining History: Poor Historian





- Past Medical History


Pulmonary: Yes: Bronchitis, Pneumonia





- Alcohol/Substance Use


Hx Alcohol Use: No (denies)


Number of Drinks Daily: 3


Date of Last Use: 12/04/19





- Smoking History


Smoking history: Current every day smoker


Have you smoked in the past 12 months: Yes


Aproximately how many cigarettes per day: 5





- Social History


ADL: Independent


Occupation: unemployed


History of Recent Travel: No





Home Medications





- Allergies


Allergies/Adverse Reactions: 


                                    Allergies











Allergy/AdvReac Type Severity Reaction Status Date / Time


 


No Known Allergies Allergy   Verified 03/20/20 15:00














Review of Systems





- Review of Systems


Constitutional: denies: Chills, Fever


Eyes: denies: Recent Change in Vision


HENT: denies: Nasal Congestion, Throat Pain


Neck: denies: Stiffness, Tenderness


Cardiovascular: reports: Shortness of Breath.  denies: Chest Pain


Respiratory: denies: Cough, Hemoptysis, Wheezing


Gastrointestinal: denies: Abdominal Pain, Nausea, Vomiting


Genitourinary: denies: Dysuria, Hematuria


Neurological: denies: Dizziness, Headache


Endocrine: denies: Unexplained Weight Loss





Physical Exam


Vital Sings: 


                                   Vital Signs











Temperature  97.6 F   03/21/20 06:00


 


Pulse Rate  94 H  03/21/20 06:00


 


Respiratory Rate  20   03/21/20 09:00


 


Blood Pressure  110/85   03/21/20 06:00


 


O2 Sat by Pulse Oximetry (%)  98   03/21/20 09:00











Constitutional: Yes: Calm


Eyes: Yes: Conjunctiva Clear, EOM Intact


HENT: Yes: Atraumatic, Normocephalic


Neck: Yes: Supple, Trachea Midline


Cardiovascular: Yes: Regular Rate and Rhythm


Respiratory: Yes: Diminished (distant breath sounds).  No: Wheezes


...Clubbing: No


Gastrointestinal: Yes: Normal Bowel Sounds, Soft.  No: Tenderness


Edema: No


Neurological: Yes: Alert, Oriented


Labs: 


                                    CBC, BMP





                                 03/21/20 07:03 





                                 03/21/20 07:03 











Imaging





- Results


Chest X-ray: Report Reviewed, Image Reviewed


Cat Scan: Report Reviewed, Image Reviewed (chronic RUL, RML changes, RLL nodule)





Assessment/Plan





Mild COPD Exacerbation


Lung Nodule





-  inhaled bronchodilators as needed


-  O2 to keep SpO2 >90%


-  outpt f/u of lung nodule


-  DVT prophylaxis


-  very low suspicion for COVID-19





Thank you for this consult


Abraham Garcia MD

## 2020-03-21 NOTE — PN
Physical Exam: 


SUBJECTIVE: Patient seen and examined





OBJECTIVE:


Patient is 69 year old male with a significant past medical history of COPD.  

Patient also reports having right shoulder pain which he states he sustained 

after being kicked out of [facility] yesterday.  Patient denies falls.  He

is a poor historian and unable to provide full past medical history.  History 

taken from chart and ED signout.  Patient complained of body aches, shortness of

breath and general malaise. No recent travel, no recent sick contact, no cough. 

He is being ruled out for possible COVID 19 infection at this time.   He will be

isolated.  





He was given Solumedrol 125 in the ED with relief of symptoms.  Currently 

tolerating room air and denies any shortness of breath. 





                                   Vital Signs











 Period  Temp  Pulse  Resp  BP Sys/Michael  Pulse Ox


 


 Last 24 Hr  97.2 F-98.5 F    18-22  110-130/59-85  98-98





 


GENERAL: Awake, alert, and fully oriented, in no acute distress. Telugu 

speaking primarily


HEAD: Normal with no signs of trauma.


EYES: Pupils equal, round and reactive to light, extraocular movements intact, 

sclera anicteric, conjunctiva clear. No lid lag.


EARS, NOSE, THROAT: Ears normal, nares patent, oropharynx clear without 

exudates. Moist mucous membranes.


NECK: Normal range of motion, supple without lymphadenopathy, JVD, or masses.


LUNGS: diminished, no wheezing, tolerating room air


HEART: Regular rate and rhythm 


ABDOMEN: Soft, nontender, not distended, normoactive bowel sounds 


MUSCULOSKELETAL:  No bony deformities or tenderness. No CVA tenderness.


UPPER EXTREMITIES:   No peripheral edema.


LOWER EXTREMITIES:   No peripheral edema. 


NEUROLOGICAL:    Normal speech. Normal gait.


PSYCHIATRIC: Cooperative. Good eye contact. Appropriate mood and affect.


SKIN: Warm, dry, normal turgor, no rashes or lesions noted, normal capillary 

refill. 





                         Laboratory Results - last 24 hr











  03/20/20 03/20/20 03/20/20





  14:10 14:10 14:15


 


WBC   


 


RBC   


 


Hgb   


 


Hct   


 


MCV   


 


MCH   


 


MCHC   


 


RDW   


 


Plt Count   


 


MPV   


 


PT with INR   15.10 H 


 


INR   1.28 H 


 


PTT (Actin FS)   


 


Sodium    135 L


 


Potassium    4.3


 


Chloride    101


 


Carbon Dioxide    21


 


Anion Gap    13


 


BUN    27.7 H


 


Creatinine    0.8


 


Est GFR (CKD-EPI)AfAm    104.90


 


Est GFR (CKD-EPI)NonAf    90.51


 


POC Glucometer   


 


Random Glucose    129 H


 


Hemoglobin A1c %   


 


Lactic Acid   


 


Calcium    9.1


 


Phosphorus   


 


Magnesium   


 


Total Bilirubin    1.0


 


AST    65 H


 


ALT    25


 


Alkaline Phosphatase    109


 


Creatine Kinase    1159 H


 


Creatine Kinase Index    1.3


 


CK-MB (CK-2)    15.5 H


 


Troponin I    < 0.02


 


B-Natriuretic Peptide    63.8


 


Total Protein    8.2


 


Albumin    3.5


 


TSH   


 


Blood Type  A POSITIVE  


 


Antibody Screen  Negative  














  03/20/20 03/21/20 03/21/20





  14:15 07:03 07:03


 


WBC   8.8 


 


RBC   3.65 L 


 


Hgb   11.1 L 


 


Hct   33.6 L 


 


MCV   92.2 


 


MCH   30.3 


 


MCHC   32.9 


 


RDW   13.2 


 


Plt Count   218 


 


MPV   9.0 


 


PT with INR   


 


INR   


 


PTT (Actin FS)    31.7


 


Sodium   


 


Potassium   


 


Chloride   


 


Carbon Dioxide   


 


Anion Gap   


 


BUN   


 


Creatinine   


 


Est GFR (CKD-EPI)AfAm   


 


Est GFR (CKD-EPI)NonAf   


 


POC Glucometer   


 


Random Glucose   


 


Hemoglobin A1c %   


 


Lactic Acid  1.7  


 


Calcium   


 


Phosphorus   


 


Magnesium   


 


Total Bilirubin   


 


AST   


 


ALT   


 


Alkaline Phosphatase   


 


Creatine Kinase   


 


Creatine Kinase Index   


 


CK-MB (CK-2)   


 


Troponin I   


 


B-Natriuretic Peptide   


 


Total Protein   


 


Albumin   


 


TSH   


 


Blood Type   


 


Antibody Screen   














  03/21/20 03/21/20 03/21/20





  07:03 07:03 11:53


 


WBC   


 


RBC   


 


Hgb   


 


Hct   


 


MCV   


 


MCH   


 


MCHC   


 


RDW   


 


Plt Count   


 


MPV   


 


PT with INR   


 


INR   


 


PTT (Actin FS)   


 


Sodium  134 L  


 


Potassium  4.9  


 


Chloride  102  


 


Carbon Dioxide  25  


 


Anion Gap  8  


 


BUN  23.4 H  


 


Creatinine  0.7  


 


Est GFR (CKD-EPI)AfAm  110.82  


 


Est GFR (CKD-EPI)NonAf  95.61  


 


POC Glucometer    371


 


Random Glucose  313 H  


 


Hemoglobin A1c %   7.0 H 


 


Lactic Acid   


 


Calcium  8.2 L  


 


Phosphorus  3.0  


 


Magnesium  2.2  


 


Total Bilirubin  1.0  


 


AST  41 H  


 


ALT  21  


 


Alkaline Phosphatase  89  


 


Creatine Kinase  557 H  


 


Creatine Kinase Index  1.3  


 


CK-MB (CK-2)  7.3 H  


 


Troponin I   


 


B-Natriuretic Peptide   


 


Total Protein  7.1  


 


Albumin  2.9 L  


 


TSH  0.53  


 


Blood Type   


 


Antibody Screen   








Active Medications











Generic Name Dose Route Start Last Admin





  Trade Name Freq  PRN Reason Stop Dose Admin


 


Albuterol Sulfate  2 puff  03/20/20 20:00  03/21/20 13:25





  Ventolin Hfa Inhaler -  IH   2 puff





  RQID ROLY   Administration


 


Folic Acid  1 mg  03/20/20 18:45  03/21/20 09:48





  Folic Acid -  PO   1 mg





  DAILY ROLY   Administration


 


Sodium Chloride  1,000 mls @ 75 mls/hr  03/21/20 09:15  03/21/20 10:25





  Normal Saline -  IV   75 mls/hr





  ASDIR ROLY   Administration


 


Insulin Aspart  1 vial  03/21/20 11:00  03/21/20 13:26





  Novolog Vial Sliding Scale -  SQ   10 units





  ACHS ROLY   Administration





  Protocol  


 


Lidocaine  1 patch  03/20/20 19:00  03/20/20 21:00





  Lidoderm Patch -  TP   1 patch





  DAILY@1900 ROLY   Administration


 


Miscellaneous  1 each  03/21/20 07:00  03/21/20 07:00





  Lidoderm Patch Removal  MC   1 each





  DAILY@0700 [facility]   Administration


 


Oxycodone HCl  5 mg  03/20/20 18:35  03/21/20 05:30





  Roxicodone -  PO   5 mg





  Q6H PRN   Administration





  PAIN LEVEL 7 - 10  











ASSESSMENT/PLAN:








Problem List





- Problems


(1) COPD exacerbation


Assessment/Plan: 


patient wheezing on presentation to the ED and given solumedrol 125mg once


will defer on further steriods at this time as he appears comfortable and is not

wheezing


tolerating room air


will order prn oxygen as needed and scheduled duonebs


pulmonary consulted


Code(s): J44.1 - CHRONIC OBSTRUCTIVE PULMONARY DISEASE W (ACUTE) EXACERBATION   





(2) Fx humeral neck


Assessment/Plan: 


for ortho to see


place on a sling


pain management with oxycodone


Code(s): S42.213A - UNSP DISP FX OF SURGICAL NECK OF UNSP HUMERUS, INIT   





(3) Shortness of breath


Assessment/Plan: 


resolved, tolerating room air


Code(s): R06.02 - SHORTNESS OF BREATH   





(4) Poorly controlled diabetes mellitus


Assessment/Plan: 


hmga1c in a.m. 


Code(s): E11.65 - TYPE 2 DIABETES MELLITUS WITH HYPERGLYCEMIA   





(5) Prophylactic measure


Assessment/Plan: 


fen


tolerating po


monitor electrolytes





full code


heparin as dvt prophy


Code(s): Z29.9 - ENCOUNTER FOR PROPHYLACTIC MEASURES, UNSPECIFIED   





Visit type





- Emergency Visit


Emergency Visit: Yes


ED Registration Date: 03/20/20


Care time: The patient presented to the Emergency Department on the above date 

and was hospitalized for further evaluation of their emergent condition.





- New Patient


This patient is new to me today: No





- Critical Care


Critical Care patient: No





- Discharge Referral


Referred to [facility] No

## 2020-03-22 VITALS — DIASTOLIC BLOOD PRESSURE: 58 MMHG | SYSTOLIC BLOOD PRESSURE: 116 MMHG | TEMPERATURE: 98.1 F | HEART RATE: 74 BPM

## 2020-03-22 LAB
ALBUMIN SERPL-MCNC: 2.6 G/DL (ref 3.4–5)
ALP SERPL-CCNC: 79 U/L (ref 45–117)
ALT SERPL-CCNC: 38 U/L (ref 13–61)
ANION GAP SERPL CALC-SCNC: 5 MMOL/L (ref 8–16)
AST SERPL-CCNC: 47 U/L (ref 15–37)
BASOPHILS # BLD: 0.6 % (ref 0–2)
BILIRUB SERPL-MCNC: 0.3 MG/DL (ref 0.2–1)
BUN SERPL-MCNC: 14.9 MG/DL (ref 7–18)
CALCIUM SERPL-MCNC: 7.8 MG/DL (ref 8.5–10.1)
CHLORIDE SERPL-SCNC: 102 MMOL/L (ref 98–107)
CO2 SERPL-SCNC: 29 MMOL/L (ref 21–32)
CREAT SERPL-MCNC: 0.7 MG/DL (ref 0.55–1.3)
DEPRECATED RDW RBC AUTO: 13 % (ref 11.9–15.9)
EOSINOPHIL # BLD: 0.8 % (ref 0–4.5)
GLUCOSE SERPL-MCNC: 289 MG/DL (ref 74–106)
HCT VFR BLD CALC: 29.9 % (ref 35.4–49)
HGB BLD-MCNC: 10 GM/DL (ref 11.7–16.9)
LYMPHOCYTES # BLD: 17.7 % (ref 8–40)
MAGNESIUM SERPL-MCNC: 1.7 MG/DL (ref 1.8–2.4)
MCH RBC QN AUTO: 31.4 PG (ref 25.7–33.7)
MCHC RBC AUTO-ENTMCNC: 33.4 G/DL (ref 32–35.9)
MCV RBC: 94.2 FL (ref 80–96)
MONOCYTES # BLD AUTO: 6.8 % (ref 3.8–10.2)
NEUTROPHILS # BLD: 74.1 % (ref 42.8–82.8)
PLATELET # BLD AUTO: 179 K/MM3 (ref 134–434)
PMV BLD: 9.1 FL (ref 7.5–11.1)
POTASSIUM SERPLBLD-SCNC: 4.4 MMOL/L (ref 3.5–5.1)
PROT SERPL-MCNC: 6.1 G/DL (ref 6.4–8.2)
RBC # BLD AUTO: 3.17 M/MM3 (ref 4–5.6)
SODIUM SERPL-SCNC: 136 MMOL/L (ref 136–145)
WBC # BLD AUTO: 9 K/MM3 (ref 4–10)

## 2020-03-22 RX ADMIN — ALBUTEROL SULFATE SCH PUFF: 90 AEROSOL, METERED RESPIRATORY (INHALATION) at 12:40

## 2020-03-22 RX ADMIN — INSULIN ASPART SCH: 100 INJECTION, SOLUTION INTRAVENOUS; SUBCUTANEOUS at 18:00

## 2020-03-22 RX ADMIN — INSULIN ASPART SCH UNITS: 100 INJECTION, SOLUTION INTRAVENOUS; SUBCUTANEOUS at 12:40

## 2020-03-22 RX ADMIN — SODIUM CHLORIDE SCH MLS/HR: 9 INJECTION, SOLUTION INTRAVENOUS at 10:45

## 2020-03-22 RX ADMIN — ALBUTEROL SULFATE SCH PUFF: 90 AEROSOL, METERED RESPIRATORY (INHALATION) at 10:44

## 2020-03-22 RX ADMIN — HEPARIN SODIUM SCH UNIT: 5000 INJECTION, SOLUTION INTRAVENOUS; SUBCUTANEOUS at 10:44

## 2020-03-22 RX ADMIN — ALBUTEROL SULFATE SCH: 90 AEROSOL, METERED RESPIRATORY (INHALATION) at 18:00

## 2020-03-22 RX ADMIN — INSULIN ASPART SCH UNITS: 100 INJECTION, SOLUTION INTRAVENOUS; SUBCUTANEOUS at 06:27

## 2020-03-22 RX ADMIN — FOLIC ACID SCH MG: 1 TABLET ORAL at 10:44

## 2020-03-22 RX ADMIN — Medication SCH EACH: at 08:41

## 2020-03-22 NOTE — CON.ORTH
Consult


Reason for Consultation:: right humerus fx





- Past Medical History


Pulmonary: Yes: Bronchitis, Pneumonia





- Alcohol/Substance Use


Hx Alcohol Use: No (denies)


Number of Drinks Daily: 3


Date of Last Use: 12/04/19





- Smoking History


Smoking history: Current every day smoker


Have you smoked in the past 12 months: Yes


Aproximately how many cigarettes per day: 5





- Social History


ADL: Independent


Occupation: unemployed


History of Recent Travel: No





Home Medications





- Allergies


Allergies/Adverse Reactions: 


                                    Allergies











Allergy/AdvReac Type Severity Reaction Status Date / Time


 


No Known Allergies Allergy   Verified 03/20/20 15:00














Physical Exam for Ortho


Vital Signs: 


                                   Vital Signs











Temperature  98.8 F   03/22/20 10:00


 


Pulse Rate  82   03/22/20 10:00


 


Respiratory Rate  18   03/22/20 10:00


 


Blood Pressure  110/62   03/22/20 10:00


 


O2 Sat by Pulse Oximetry (%)  100   03/22/20 09:00











Labs: 


                                    CBC, BMP





                                 03/21/20 07:03 





                                 03/21/20 07:03 





                                    INR, PTT











INR  1.28  (0.83-1.09)  H  03/20/20  14:10    














- Upper Extremity


Shoulder: Yes: Right, Limited ROM, Pain, Swelling, Tenderness, Other (nvi)





Imaging





- Results


X-ray: Image Reviewed





Assessment/Plan





69 year old male with a significant past medical history of COPD.  Patient also 

reports having right shoulder pain which he states he sustained after being 

kicked out of [facility] yesterday.  Patient denies falls.  He is a poor 

historian and unable to provide full past medical history.  History taken from 

chart and ED signout.  Patient complained of body aches, shortness of breath and

general malaise. No recent travel, no recent sick contact, no cough.  He is 

being ruled out for possible COVID 19 infection at this time.   He will be 

isolated.  





a/p right proximal humerus fx- acceptable position


No surgical intervention


Pt placed in hanging sling


HOB to 60 deg or more


pain control


ok to d/c from ortho pov once medically cleared


f/u in 2 weeks time for repeat xrays


d/w Dr. Kaufman

## 2020-03-22 NOTE — DS
Physical Exam: 


SUBJECTIVE: Patient seen and examined at the bedside.  has intermittent pain of 

right shoulder.  





OBJECTIVE:


Patient is 69 year old male with a significant past medical history of COPD.  

Patient also reports having right shoulder pain which he states he sustained 

after being kicked out of [facility].  Patient denies falls.  He is a poor

historian and unable to provide full past medical history.  History taken from 

chart and ED signout.  Patient complained of body aches, shortness of breath and

general malaise. No recent travel, no recent sick contact, no cough.  He is 

being ruled out for possible COVID 19 infection at this time.   However, he is 

low risk and will be sent home on quarantine until COVID results are back.  He 

has been cleared by pulmonary and orthopedics for discharge.  Patient to follow 

up with Orthopedics. 





He was given Solumedrol 125 in the ED with relief of symptoms.  Currently tole

rating room air and denies any shortness of breath. 


Patient to follow up as an outpatient. 





                                   Vital Signs











 Period  Temp  Pulse  Resp  BP Sys/Michael  Pulse Ox


 


 Last 24 Hr  97.7 F-98.8 F  76-90  18-18  103-111/53-62  100-100








PHYSICAL EXAM


GENERAL: Awake, alert, and fully oriented, in no acute distress. [place] 

speaking primarily


HEAD: Normal with no signs of trauma.


EYES: Pupils equal, round and reactive to light, extraocular movements intact, 

sclera anicteric, conjunctiva clear. No lid lag.


EARS, NOSE, THROAT: Ears normal, nares patent, oropharynx clear without 

exudates. Moist mucous membranes.


NECK: Normal range of motion, supple without lymphadenopathy, JVD, or masses.


LUNGS: diminished, no wheezing, tolerating room air


HEART: Regular rate and rhythm 


ABDOMEN: Soft, nontender, not distended, normoactive bowel sounds 


MUSCULOSKELETAL:  No bony deformities or tenderness. No CVA tenderness.


UPPER EXTREMITIES:   right shoulder edema s/p humerus fracture, on sling


  


LABS


                         Laboratory Results - last 24 hr











  03/21/20 03/21/20 03/22/20





  17:33 21:24 06:21


 


WBC   


 


RBC   


 


Hgb   


 


Hct   


 


MCV   


 


MCH   


 


MCHC   


 


RDW   


 


Plt Count   


 


MPV   


 


Absolute Neuts (auto)   


 


Neutrophils %   


 


Lymphocytes %   


 


Monocytes %   


 


Eosinophils %   


 


Basophils %   


 


Nucleated RBC %   


 


Sodium   


 


Potassium   


 


Chloride   


 


Carbon Dioxide   


 


Anion Gap   


 


BUN   


 


Creatinine   


 


Est GFR (CKD-EPI)AfAm   


 


Est GFR (CKD-EPI)NonAf   


 


POC Glucometer  281  200  237


 


Random Glucose   


 


Calcium   


 


Magnesium   


 


Total Bilirubin   


 


AST   


 


ALT   


 


Alkaline Phosphatase   


 


Total Protein   


 


Albumin   














  03/22/20 03/22/20 03/22/20





  11:57 13:00 13:00


 


WBC   9.0 


 


RBC   3.17 L 


 


Hgb   10.0 L 


 


Hct   29.9 L 


 


MCV   94.2 


 


MCH   31.4 


 


MCHC   33.4 


 


RDW   13.0 


 


Plt Count   179 


 


MPV   9.1 


 


Absolute Neuts (auto)   6.7 


 


Neutrophils %   74.1 


 


Lymphocytes %   17.7  D 


 


Monocytes %   6.8 


 


Eosinophils %   0.8  D 


 


Basophils %   0.6 


 


Nucleated RBC %   0 


 


Sodium    136


 


Potassium    4.4


 


Chloride    102


 


Carbon Dioxide    29


 


Anion Gap    5 L


 


BUN    14.9


 


Creatinine    0.7


 


Est GFR (CKD-EPI)AfAm    110.82


 


Est GFR (CKD-EPI)NonAf    95.61


 


POC Glucometer  216  


 


Random Glucose    289 H


 


Calcium    7.8 L


 


Magnesium    1.7 L


 


Total Bilirubin    0.3


 


AST    47 H


 


ALT    38


 


Alkaline Phosphatase    79


 


Total Protein    6.1 L


 


Albumin    2.6 L











HOSPITAL COURSE:





Date of Admission:03/20/20





Date of Discharge: 03/22/20














Discharge Summary


Problems reviewed: Yes


Reason For Visit: CHRONIC COPD INTIAL EVAL, FRACTURE OF NECK HUMERUS


Current Active Problems





COPD exacerbation (Acute)


Fx humeral neck (Acute)


Shortness of breath (Acute)








Condition: Good





- Instructions


Diet, Activity, Other Instructions: 


Mr Hoffmann:





Please follow up with Dr. Fish Myrick/Dr Kaufman for your right proximal humerus

fracture.  


Continue to wear the sling


Pain control with Ibuprophen 800mg every 6 hours and Neurontin 100mg once per 

day


Please follow up with orthopedic specialist for repeat xray of the right 

shoulder





Thank you for allowing us to care for you





You are being ruled out for COVID.  Please follow the discharge instructions for

COVID as made out in your discharge instructions.  





Your blood sugar was elevated and we are starting you on a medication called M

etformin.  





Please follow up with your primary care doctor for follow up.





***continue to isolate/quarnatine yourself until your COVID 19 results are 

back****  








****





PLease call [facility] :  [facility] CORONAVIRUS HOTLINE:  [phone]


What are the most common symptoms of Covid-19?


- Muscle aches


- Loss of energy and appetite


- Persistent cough


- Low grade fever lasting 24 hours or more, causing the person to feel feverish 

with chills


If I have Covid-19, how long can I expect to feel sick?


- Typically one week.


- The majority of individuals feel better in 5 to 7 days with rest and 

over-the-counter cold and flu medications.


How does illness progress in cases of Covid-19?


- A few individuals progress to pneumonia (infection of the lungs) and/or 

pneumonitis (inflammation of the lungs).


- Pneumonia/pneumonitis causes shortness of breath, worsening cough and in most 

cases, fever.


- Individuals with the symptoms of Covid-19 who develop a worsening cough and 

shortness of breath must seek care quickly.








Referrals: 


[facility] [place] [Provider Group]


Hayden Kaufman MD [Staff Physician] - 1 Week


Disposition: HOME





- Home Medications


Comprehensive Discharge Medication List: 


Ambulatory Orders





Folic Acid 1 mg PO DAILY #30 tablet 03/22/20 


Folic Acid - 1 mg PO DAILY #60 tablet 03/22/20 


Gabapentin [Neurontin] 100 mg PO DAILY #30 capsule 03/22/20 


Ibuprofen 800 mg PO TID #90 tablet 03/22/20 


Metformin HCl [Glucophage] 500 mg PO DAILY #30 tablet 03/22/20 











Problem List





- Problems


(1) Suspected 2019 novel coronavirus infection


Assessment/Plan: 


covid 19 pending


will send home on quarantine


patient tolerating room air, feels generally well and has been cleared by 

pulmonary for discharge


Code(s): R68.89 - OTHER GENERAL SYMPTOMS AND SIGNS   





(2) COPD exacerbation


Assessment/Plan: 


patient wheezing on presentation to the ED and given solumedrol 125mg once


will defer on further steriods at this time as he appears comfortable and is not

 wheezing


tolerating room air


will order prn oxygen as needed and scheduled duonebs


pulmonary consulted


Code(s): J44.1 - CHRONIC OBSTRUCTIVE PULMONARY DISEASE W (ACUTE) EXACERBATION   





(3) Fx humeral neck


Assessment/Plan: 


seen by orthopedics.  recommend to keep arm in sling and follow up in 1 week for

 repeat imaging


will no send home with narcotic medications as patient has history of etoh abuse

 and may use alcohol with narcotics 


control pain on ibuprohen and neurontin


Code(s): S42.213A - UNSP DISP FX OF SURGICAL NECK OF UNSP HUMERUS, INIT   





(4) Shortness of breath


Assessment/Plan: 


resolved, tolerating room air


Code(s): R06.02 - SHORTNESS OF BREATH   





(5) Poorly controlled diabetes mellitus


Assessment/Plan: 


hmga1c 7.0, elevated bgms.  will d/c on metformin with outpatient follow up. 


Code(s): E11.65 - TYPE 2 DIABETES MELLITUS WITH HYPERGLYCEMIA   





(6) Prophylactic measure


Assessment/Plan: 


discharge home


Code(s): Z29.9 - ENCOUNTER FOR PROPHYLACTIC MEASURES, UNSPECIFIED   





- Discharge Referral


Referred to [facility] No

## 2020-03-22 NOTE — PN
Progress Note (short form)





- Note


Progress Note: 





PULMONARY





Denies shortness of breath, cough or wheezing. No fevers.





                                   Vital Signs











 Period  Temp  Pulse  Resp  BP Sys/Michael  Pulse Ox


 


 Last 24 Hr  97.7 F-98.5 F  76-92  18-19  103-111/53-61  100








Gen:  NAD at rest


Heart: RRR


Lung: decreased breath sounds at the bases, no wheezes


Abd: soft, nontender


Ext: no edema





                                    CBC, BMP





                                 03/21/20 07:03 





                                 03/21/20 07:03 





Active Medications





Albuterol Sulfate (Ventolin Hfa Inhaler -)  2 puff IH RQID [facility]


   Last Admin: 03/22/20 10:44 Dose:  2 puff


   Documented by: 


Folic Acid (Folic Acid -)  1 mg PO DAILY [facility]


   Last Admin: 03/22/20 10:44 Dose:  1 mg


   Documented by: 


Heparin Sodium (Porcine) (Heparin -)  5,000 unit SQ BID [facility]


   Last Admin: 03/22/20 10:44 Dose:  5,000 unit


   Documented by: 


Sodium Chloride (Normal Saline -)  1,000 mls @ 75 mls/hr IV ASDIR [facility]


   Last Admin: 03/22/20 10:45 Dose:  75 mls/hr


   Documented by: 


Insulin Aspart (Novolog Vial Sliding Scale -)  1 vial SQ ACHS [facility]; Protocol


   Last Admin: 03/22/20 06:27 Dose:  4 units


   Documented by: 


Lidocaine (Lidoderm Patch -)  1 patch TP DAILY@1900 [facility]


   Last Admin: 03/21/20 18:04 Dose:  1 patch


   Documented by: 


Miscellaneous (Lidoderm Patch Removal)  1 each MC DAILY@0700 [facility]


   Last Admin: 03/21/20 07:00 Dose:  1 each


   Documented by: 


Oxycodone HCl (Roxicodone -)  5 mg PO Q6H PRN


   PRN Reason: PAIN LEVEL 7 - 10


   Last Admin: 03/22/20 10:45 Dose:  5 mg


   Documented by: 





A/P


Mild COPD Exacerbation


Lung Nodule





-  inhaled bronchodilators as needed


-  O2 to keep SpO2 >90%


-  outpt f/u of lung nodule


-  DVT prophylaxis


-  very low suspicion for COVID-19


-  can d/c home from pulmonary standpoint

## 2020-06-23 ENCOUNTER — HOSPITAL ENCOUNTER (INPATIENT)
Dept: HOSPITAL 74 - JER | Age: 70
LOS: 4 days | Discharge: LEFT BEFORE DISCHARGE | DRG: 894 | End: 2020-06-27
Attending: INTERNAL MEDICINE | Admitting: INTERNAL MEDICINE
Payer: COMMERCIAL

## 2020-06-23 VITALS — BODY MASS INDEX: 17.3 KG/M2

## 2020-06-23 DIAGNOSIS — E86.0: ICD-10-CM

## 2020-06-23 DIAGNOSIS — I95.9: ICD-10-CM

## 2020-06-23 DIAGNOSIS — R64: ICD-10-CM

## 2020-06-23 DIAGNOSIS — Z91.14: ICD-10-CM

## 2020-06-23 DIAGNOSIS — E88.09: ICD-10-CM

## 2020-06-23 DIAGNOSIS — J44.9: ICD-10-CM

## 2020-06-23 DIAGNOSIS — S42.91XP: ICD-10-CM

## 2020-06-23 DIAGNOSIS — E43: ICD-10-CM
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
## 2020-06-23 DIAGNOSIS — R59.0: ICD-10-CM

## 2020-06-23 DIAGNOSIS — F17.210: ICD-10-CM

## 2020-06-23 DIAGNOSIS — R62.7: ICD-10-CM

## 2020-06-23 DIAGNOSIS — R07.9: ICD-10-CM

## 2020-06-23 DIAGNOSIS — Z86.73: ICD-10-CM

## 2020-06-23 DIAGNOSIS — Z59.0: ICD-10-CM
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
## 2020-06-23 DIAGNOSIS — E11.9: ICD-10-CM

## 2020-06-23 DIAGNOSIS — R55: ICD-10-CM

## 2020-06-23 DIAGNOSIS — F10.220: Primary | ICD-10-CM

## 2020-06-23 DIAGNOSIS — Z79.84: ICD-10-CM

## 2020-06-23 LAB
ALBUMIN SERPL-MCNC: 3.2 G/DL (ref 3.4–5)
ALP SERPL-CCNC: 102 U/L (ref 45–117)
ALT SERPL-CCNC: 16 U/L (ref 13–61)
ANION GAP SERPL CALC-SCNC: 10 MMOL/L (ref 8–16)
AST SERPL-CCNC: 15 U/L (ref 15–37)
BASOPHILS # BLD: 0.8 % (ref 0–2)
BILIRUB SERPL-MCNC: 0.4 MG/DL (ref 0.2–1)
BUN SERPL-MCNC: 17.9 MG/DL (ref 7–18)
CALCIUM SERPL-MCNC: 8.7 MG/DL (ref 8.5–10.1)
CHLORIDE SERPL-SCNC: 105 MMOL/L (ref 98–107)
CO2 SERPL-SCNC: 25 MMOL/L (ref 21–32)
CREAT SERPL-MCNC: 0.7 MG/DL (ref 0.55–1.3)
DEPRECATED RDW RBC AUTO: 13.2 % (ref 11.9–15.9)
EOSINOPHIL # BLD: 5.9 % (ref 0–4.5)
GLUCOSE SERPL-MCNC: 198 MG/DL (ref 74–106)
HCT VFR BLD CALC: 39.7 % (ref 35.4–49)
HGB BLD-MCNC: 12.9 GM/DL (ref 11.7–16.9)
LYMPHOCYTES # BLD: 17 % (ref 8–40)
MAGNESIUM SERPL-MCNC: 2.1 MG/DL (ref 1.8–2.4)
MCH RBC QN AUTO: 30.4 PG (ref 25.7–33.7)
MCHC RBC AUTO-ENTMCNC: 32.5 G/DL (ref 32–35.9)
MCV RBC: 93.5 FL (ref 80–96)
MONOCYTES # BLD AUTO: 7.4 % (ref 3.8–10.2)
NEUTROPHILS # BLD: 68.9 % (ref 42.8–82.8)
PHOSPHATE SERPL-MCNC: 3.8 MG/DL (ref 2.5–4.9)
PLATELET # BLD AUTO: 265 K/MM3 (ref 134–434)
PMV BLD: 8.3 FL (ref 7.5–11.1)
POTASSIUM SERPLBLD-SCNC: 3.8 MMOL/L (ref 3.5–5.1)
PROT SERPL-MCNC: 7.5 G/DL (ref 6.4–8.2)
RBC # BLD AUTO: 4.25 M/MM3 (ref 4–5.6)
SODIUM SERPL-SCNC: 140 MMOL/L (ref 136–145)
WBC # BLD AUTO: 8.2 K/MM3 (ref 4–10)

## 2020-06-23 PROCEDURE — U0003 INFECTIOUS AGENT DETECTION BY NUCLEIC ACID (DNA OR RNA); SEVERE ACUTE RESPIRATORY SYNDROME CORONAVIRUS 2 (SARS-COV-2) (CORONAVIRUS DISEASE [COVID-19]), AMPLIFIED PROBE TECHNIQUE, MAKING USE OF HIGH THROUGHPUT TECHNOLOGIES AS DESCRIBED BY CMS-2020-01-R: HCPCS

## 2020-06-23 SDOH — ECONOMIC STABILITY - HOUSING INSECURITY: HOMELESSNESS: Z59.0

## 2020-06-23 NOTE — PDOC
Documentation entered by Evelin Garrido SCRIBE, acting as scribe for Martha Gaspar MD.








Martha Gaspar MD:  This documentation has been prepared by the Hema jensen Brenda, SCRIBE, under my direction and personally reviewed by me in its 

entirety.  I confirm that the documentation accurately reflects all work, 

treatment, procedures, and medical decision making performed by me.  





Attending Attestation





- Resident


Resident Name: Tera Otero





- ED Attending Attestation


I have performed the following: I have examined & evaluated the patient, The 

case was reviewed & discussed with the resident, I agree w/resident's findings &

plan, Exceptions are as noted





- HPI


HPI: 





06/23/20 18:16


The patient is a 69 year old male non domiciled , with a significant PMH of who 

presents to the emergency department [facility] after being found by bystanders on the

street with suspicion for alcohol intoxication. The patient currently complains 

of chest pain and a chronic right shoulder pain. 





The patient denies shortness of breath, headache and dizziness. Denies fever, 

chills, nausea, vomiting, diarrhea and constipation. Denies dysuria, frequency, 

urgency and hematuria.





Allergies: NKA


Social history: Alcohol use reported by patient. 








- Physicial Exam


PE: 





06/23/20 18:22


GENERAL: (+) Thin (+) Disheveled (+) unkept 


No apparent distress.


HEENT: 


Normocephalic, atraumatic. PERRL, EOM intact.


CARDIOVASCULAR: 


Normal S1, S2. Regular rate and rhythm.


PULMONARY: 


Clear to auscultation bilaterally.


ABDOMEN: 


Soft, non-distended, non-tender. 


EXTREMITIES: 


+++  painful right shoulder


SKIN: 


Warm, dry.  No rash


NEUROLOGICAL: 


No focal neurological deficits.








06/23/20 21:44








- Medical Decision Making





06/23/20 18:43


-thin ,intoxicated ,homeless 68 yo male was found on the ground and bystander 

called 911.  


-he was seen here May 10,2020 , Today's ekg unchanged from the one done last 

month


06/23/20 21:45


pt was seen May 10,2020  and found to have an impacted right humerus fracture 

and referred to orthopedics


06/23/20 21:46


pt is SBP now 81 and pt has c/o of dizziness


06/23/20 22:24


ct scan head no acute intracranial pathology


ct scan c spine no fracture


labs reviewed  [id number],  [id number]


06/23/20 22:40


admit syncope/hypotension,rt subacute  humeral fracture,etoh intox,,diabetes








Discharge





- Discharge Information


Problems reviewed: Yes


Clinical Impression/Diagnosis: 


Shoulder fracture, right


Qualifiers:


 Encounter type: subsequent encounter Fracture type: closed Fracture healing: 

with malunion Qualified Code(s): S42.91XP - Fracture of right shoulder girdle, 

part unspecified, subsequent encounter for fracture with malunion





Elevated ETOH level


Qualifiers:


 Blood alcohol level: level not specified Qualified Code(s): R78.0 - Finding of 

alcohol in blood








- Follow up/Referral





- Patient Discharge Instructions





- Post Discharge Activity

## 2020-06-23 NOTE — PN
Teaching Attending Note


Name of Resident: Eli Herring





ATTENDING PHYSICIAN STATEMENT





I saw and evaluated the patient.


I reviewed the resident's note and discussed the case with the resident.


I agree with the resident's findings and plan as documented.








SUBJECTIVE:


Patient is a 69 year old undomiciled man with a PMH of Alcohol abuse (last drink

today; drank 1 bottle of hard liquor), Appendectomy, Right leg surgery with tracey 

placement, Tobacco use, NIDDM (nonadherent with medications), CVA with no 

residual deficits, Hypotension, Right humerus fracture (?March 20202) and COPD 

presents to the ER after suspected syncopal episode. Per EMS, the patient was 

noted to be lying down on the sidewalk and some bystanders called EMS. Patient 

states he was drinking today and denies falling, instead deciding to lay down on

the ground. He was unsure if he had passed out, but stated he felt very hot. 

Reports pain in the right shoulder that is acute on chronic, but is unsure of 

when it became worse. Also has had chest pain for the past 2 weeks. Patient 

denies fevers, chills, nausea, vomiting, visual disturbances, SOB, back pain, 

abdominal pain, dysuria, hematuria, diarrhea or leg swelling. Endorses not 

drinking enough water. Denies tobacco or illicit drug use. No sick contacts or 

recent travels. Family history of alcoholism in father and DM in mother. 





OBJECTIVE:


Alert and disheveled


                                   Vital Signs











 Period  Temp  Pulse  Resp  BP Sys/Michael  Pulse Ox


 


 Last 24 Hr  97.9 F  95  16  91/56  97








HEENT: No Jaundice, eye redness or discharge, PERRLA, EOMI. Normocephalic, 

atraumatic. External ears are normal and hearing is grossly intact. No nasal 

discharge.


Neck: Supple, nontender. No palpable adenopathy or thyromegaly. No JVD


Chest: Good effort. Clear to auscultation and percussion.


Heart: Regular. No S3, rub or murmur


Abdomen: Not distended, soft, nontender and no HSM. No rebound or guarding. 

Normal bowel sounds.


Ext: Peripheral pulses intact. No leg edema. Tenderness and limited ROM in right

shoulder.


Skin: Warm and dry. No petechiae, rash or ecchymosis.


Neuro: Alert. Oriented x3. Combative. No asterexis or tremors; CN 2-12 grossly 

intact. Sensation grossly intact in all four extremities and DTR are symmetric.


Psych: Appropriate mood and affect. Good insight.


                                Home Medications











 Medication  Instructions  Recorded


 


NK [No Known Home Medication]  05/10/20








                              Abnormal Lab Results











  06/23/20 06/23/20





  18:30 18:30


 


Eosinophils %  5.9 H 


 


Random Glucose   198 H


 


Albumin   3.2 L


 


Alcohol, Quantitative   118.9 H








                               Current Medications











Generic Name Dose Route Start Last Admin





  Trade Name Leroy  PRN Reason Stop Dose Admin


 


Acetaminophen  650 mg  06/24/20 00:46 





  Tylenol -  PO  





  Q6H PRN  





  Fever Or Pain  


 


Albuterol/Ipratropium  1 amp  06/24/20 00:47 





  Duoneb -  NEB  





  Q6H PRN  





  SHORTNESS OF BREATH  


 


Enoxaparin Sodium  40 mg  06/24/20 10:00 





  Lovenox -  SQ  





  DAILY ROLY  


 


Folic Acid  1 mg  06/24/20 10:00 





  Folic Acid -  PO  





  DAILY ROLY  


 


Folic Acid 1 mg/ Thiamine HCl  1,000 mls @ 125 mls/hr  06/24/20 00:45 





100 mg/ Multivitamins/Minerals  IVPB  06/24/20 08:44 





10 ml/ Sodium Chloride  ONCE ONE  


 


Insulin Aspart  1 vial  06/24/20 07:00 





  Novolog Vial Sliding Scale -  SQ  





  [facility] ROLY  





  Protocol  


 


Multivitamins/Minerals/Vitamin C  1 tab  06/24/20 10:00 





  Tab-A-Vit -  PO  





  DAILY ROLY  


 


Thiamine HCl  100 mg  06/24/20 10:00 





  Vitamin B1 -  PO  





  DAILY [facility]  











ASSESSMENT AND PLAN:


1. Alcohol intoxication/?Syncope/Chronic right shoulder fracture - No clear 

evidence for syncope - may just have been intoxicated. No acute abnormality on 

head CT or C-spine CT. Chest CT with out contrast shows right humeral neck 

fracture, enlarged mediastinal lymphnodes, chronic opacities in the upper lobes,

subpleural reticulation in the upper lobes and right middle lobe. CXR shows 

upper lobe opacities (R>L), increase interstitial markings in the RML, and 

ipsilateral trachea and mediastinal deviation. Will consult Pulmonary and Ortho.





Will treat with IV Banana bag, thiamine and folic acid. Implement [facility] librium 

alcohol withdrawal protocol and do neurochecks. Implement seizure, fall and 

aspiration precautions. Monitor and replete electrolytes (Ca,Mg,K,P). Counseled 

patient about abstaining from alcohol. Will consult Addiction specialist and 

refer to alcohol detox upon discharge. Will consult  for assistance

with his living situation. Viral testing for COVID-19 ordered and patient placed

on airborne, droplet and contact isolation. EKG shows NSR at 97/minute, biatrial

enlargement, LAFB and QTc 454 with no significant ST-T wave changes. Will 

continue comprehensive care for all of patients comorbid conditions.





2. DM  Check HbA1c. Implement sliding scale insulin regimen. Provide 

comprehensive diabetes care with patient teaching and counseling about the 

importance of adherence to prescribed diabetes regimen, euglycemia, eye care and

foot care. Start Metformin before discharge.





3. Tobacco Use  Counseled on risks associated with tobacco use. We will provide

patient all the necessary assistance to facilitate smoking cessation and 

prescribe Nicotine patch.





4. Hypoalbuminemia - Possibly due to combined effects of malnutrition and 

inflammation associated with comorbid conditions. Will ensure adequate dietary 

protein intake and also consult dietician. Urinalysis pending.





5. DVT prophylaxis - Lovenox 40 mg SQ q 24 hours.     





6. Advance directives - Full code

## 2020-06-23 NOTE — PDOC
History of Present Illness





- General


Stated Complaint: FALL


Time Seen by Provider: 06/23/20 18:08


History Source: Patient


Exam Limitations: No Limitations





- History of Present Illness


Initial Comments: 





68 yo M with a hx of ETOH abuse (last drink today; drank 1 bottle of hard 

liquor), CVA with no residual deficits, hx of hypotension, and COPD presents to 

the emergency department s/p suspected syncopal episode. Per EMS, the patient 

was noted to be lying down the sidewalk. The patient states he was drinking 

today and denies falling, instead deciding to lay down on the ground. He was 

unsure if he had passed out, but stated he felt very hot. The patient endorses 

having pain in the right shoulder that is acute on chronic, but is unsure of 

when it became worse. The patient endorses having chest pain for the past 2 

weeks. Denies the following: fevers, chills, nausea, vomiting, visual 

disturbances, FND, back pain, abdominal pain, ears/nose/throat pain, dysuria, 

hematuria, diarrhea, and leg swelling. Endorses not drinking enough water. 











Past History





- Medical History


Allergies/Adverse Reactions: 


                                    Allergies











Allergy/AdvReac Type Severity Reaction Status Date / Time


 


No Known Drug Allergies Allergy   Verified 07/05/20 23:30











Home Medications: 


Ambulatory Orders





Budesonide/Formeterol Fumarate [SYMBICORT 160/4.5mcg -] 2 inh IH BID 06/24/20 


Sitagliptin Phos/Metformin HCl [Janumet  mg Tablet] 50 - 500 mg PO BID 

06/24/20 








COPD: No


Psychiatric Problems: Yes (ETOH dependency)





- Psycho-Social/Smoking History


Smoking History: Current every day smoker


Have you smoked in the past 12 months: Yes


Number of Cigarettes Smoked Daily: 10





**Review of Systems





- Review of Systems


Able to Perform ROS?: Yes


Is the patient limited English proficient: No


Constitutional: No: Chills, Diaphoresis, Fever, Weakness


HEENTM: No: Eye Pain, Ear Pain, Nose Pain, Throat Pain


Respiratory: No: Cough, Shortness of Breath, Hemoptysis


Cardiac (ROS): Yes: Chest Pain, Syncope.  No: Lightheadedness


ABD/GI: Yes: Poor Fluid Intake.  No: Constipated, Diarrhea, Nausea, Rectal 

Bleeding, Vomiting, Tarry Stools


: No: Dysuria, Hematuria


Musculoskeletal: Yes: Joint Pain (shoulder pain).  No: Back Pain


Integumentary: No: Bruising, Rash


Neurological: No: Headache, Numbness, Tingling, Tremors


Psychiatric: No: Change in Appetite


Endocrine: No: Unexplained Weight Loss


Hematologic/Lymphatic: No: Anemia





*Physical Exam





- Physical Exam


General Appearance: Yes: Intoxicated, Cachetic.  No: Appropriately Dressed, 

Apparent Distress


HEENT: positive: EOMI, DOC, Normal Voice, Symmetrical, Hearing Grossly Normal, 

Other (no trauma noted on the head. no contusions felt).  negative: Pharynx 

Normal (dry mucous membranes), Pale Conjunctivae, Scleral Icterus (R), Scleral 

Icterus (L), Muffled/Hoarse voice, Pharyngeal Erythema, Tonsillar Exudate, 

Tonsillar Erythema, Excessive drooling


Neck: positive: Tender (right paracervical spine tenderness at the C6-7 region),

Trachea midline, Supple.  negative: Lymphadenopathy (R), Lymphadenopathy (L)


Respiratory/Chest: positive: Lungs Clear, Normal Breath Sounds.  negative: Chest

Tender, Respiratory Distress, Accessory Muscle Use, Crackles, Rales, Rhonchi, 

Stridor, Wheezing


Cardiovascular: positive: Regular Rhythm, Regular Rate, S1, S2.  negative: 

Systolic Murmur


Gastrointestinal/Abdominal: positive: Normal Bowel Sounds, Flat, Soft.  

negative: Tender, Distended, Guarding, Rebound, Tenderness


Lymphatic: negative: Adenopathy


Musculoskeletal: positive: Normal Inspection.  negative: CVA Tenderness, 

Vertebral Tenderness


Extremity: positive: Normal Capillary Refill, Normal Inspection, Normal Range of

Motion.  negative: Tender, Swelling, Calf Tenderness


Integumentary: positive: Normal Color, Dry, Warm.  negative: Swelling, 

Ecchymosis


Neurologic: positive: Fully Oriented, Alert, Normal Mood/Affect





ED Treatment Course





- LABORATORY


CBC & Chemistry Diagram: 


                                 06/27/20 06:35





                                 06/27/20 06:35





Medical Decision Making





- Medical Decision Making





06/23/20 22:19


68 yo M with a hx of ETOH abuse (last drink today; drank 1 bottle of hard 

liquor), CVA with no residual deficits, hx of hypotension, and COPD presents to 

the emergency department s/p suspected syncopal episode.





Initial vitas:


                               Initial Vital Signs











Temp Pulse Resp BP Pulse Ox


 


 97.9 F   95 H  16   91/56 L  97 


 


 06/23/20 17:45  06/23/20 17:45  06/23/20 17:45  06/23/20 17:45  06/23/20 17:45








Work up:


patient presents to the emergency department intoxicated with dehydration





suspected syncope. will obtain head ct to rule out intracranial process


Will obtain labs for ddx: cardiac vs hypovolemia vs neurogenic vs electrolyte 

disturbance


                                Laboratory Tests











  06/23/20 06/23/20 06/23/20





  18:15 18:30 18:30


 


WBC   8.2 


 


RBC   4.25 


 


Hgb   12.9 


 


Hct   39.7 


 


MCV   93.5 


 


MCH   30.4 


 


MCHC   32.5 


 


RDW   13.2 


 


Plt Count   265 


 


MPV   8.3 


 


Absolute Neuts (auto)   5.7 


 


Neutrophils %   68.9 


 


Lymphocytes %   17.0  D 


 


Monocytes %   7.4 


 


Eosinophils %   5.9 H 


 


Basophils %   0.8 


 


Nucleated RBC %   0 


 


PT with INR    Cancelled


 


INR    Cancelled


 


Sodium   


 


Potassium   


 


Chloride   


 


Carbon Dioxide   


 


Anion Gap   


 


BUN   


 


Creatinine   


 


Est GFR (CKD-EPI)AfAm   


 


Est GFR (CKD-EPI)NonAf   


 


POC Glucometer  193  


 


Random Glucose   


 


Calcium   


 


Phosphorus   


 


Magnesium   


 


Total Bilirubin   


 


AST   


 


ALT   


 


Alkaline Phosphatase   


 


Creatine Kinase   


 


Troponin I   


 


Total Protein   


 


Albumin   


 


Alcohol, Quantitative   














  06/23/20





  18:30


 


WBC 


 


RBC 


 


Hgb 


 


Hct 


 


MCV 


 


MCH 


 


MCHC 


 


RDW 


 


Plt Count 


 


MPV 


 


Absolute Neuts (auto) 


 


Neutrophils % 


 


Lymphocytes % 


 


Monocytes % 


 


Eosinophils % 


 


Basophils % 


 


Nucleated RBC % 


 


PT with INR 


 


INR 


 


Sodium  140


 


Potassium  3.8


 


Chloride  105


 


Carbon Dioxide  25


 


Anion Gap  10


 


BUN  17.9


 


Creatinine  0.7


 


Est GFR (CKD-EPI)AfAm  110.82


 


Est GFR (CKD-EPI)NonAf  95.61


 


POC Glucometer 


 


Random Glucose  198 H


 


Calcium  8.7


 


Phosphorus  3.8


 


Magnesium  2.1


 


Total Bilirubin  0.4


 


AST  15


 


ALT  16


 


Alkaline Phosphatase  102


 


Creatine Kinase  90


 


Troponin I  < 0.02


 


Total Protein  7.5


 


Albumin  3.2 L


 


Alcohol, Quantitative  118.9 H








patient had a 118.9 alcohol levels


troponin is negative


spoke to Dr. Monzon in regards to CXR who states the superior pneumomediastinum 

tract is minimal and could be related to possible self injection in the right 

side of the neck. Possible that the patient had multiple vomiting episodes or 

recent trauma. Of note, there is a 1.7x1.1 cm moderately sclerotic lesion within

the left lateral third of the t3 verterbral body possibly presenting a 

hemangioma. 


a chest CT was ordered due to the tract seen on the cervical spine CT


Chest CT shows several nonspecific mildly enlarged LN. No acute infiltrate 

noted. Extensive opacity seen within the right pulmonary apex which appears 

chronic in nature with ipsilateral mediastinal deviation. 


head ct does not show an acute intracranial process.


right shoulder xray shows a healing humeral neck fracture with moderate 

degenerative arthritis. no acute changes.


EKG: NSR without ST elevation or depression. RBBB incomplete noted in V1. Left 

anterior fasicular block noted. 


Patient to be admitted for unwitnessed syncope in the setting of hypotension 

with alcohol intoxication with questionable trauma to the neck given the 

pneumomediastinum noted. 











Discharge





- Discharge Information


Problems reviewed: Yes


Clinical Impression/Diagnosis: 


Elevated ETOH level


Qualifiers:


 Blood alcohol level: level not specified Qualified Code(s): R78.0 - Finding of 

alcohol in blood





Shoulder fracture, right


Qualifiers:


 Encounter type: subsequent encounter Fracture type: closed Fracture healing: 

with malunion Qualified Code(s): S42.91XP - Fracture of right shoulder girdle, 

part unspecified, subsequent encounter for fracture with malunion








- Follow up/Referral





- Patient Discharge Instructions





- Post Discharge Activity

## 2020-06-24 LAB
ALBUMIN SERPL-MCNC: 2.7 G/DL (ref 3.4–5)
ALP SERPL-CCNC: 90 U/L (ref 45–117)
ALT SERPL-CCNC: 12 U/L (ref 13–61)
ANION GAP SERPL CALC-SCNC: 5 MMOL/L (ref 8–16)
AST SERPL-CCNC: 18 U/L (ref 15–37)
BASOPHILS # BLD: 0.8 % (ref 0–2)
BILIRUB SERPL-MCNC: 0.4 MG/DL (ref 0.2–1)
BUN SERPL-MCNC: 15.3 MG/DL (ref 7–18)
CALCIUM SERPL-MCNC: 8.2 MG/DL (ref 8.5–10.1)
CHLORIDE SERPL-SCNC: 111 MMOL/L (ref 98–107)
CHOLEST SERPL-MCNC: 129 MG/DL (ref 50–200)
CO2 SERPL-SCNC: 26 MMOL/L (ref 21–32)
CREAT SERPL-MCNC: 0.6 MG/DL (ref 0.55–1.3)
DEPRECATED RDW RBC AUTO: 12.5 % (ref 11.9–15.9)
EOSINOPHIL # BLD: 7.1 % (ref 0–4.5)
GLUCOSE SERPL-MCNC: 153 MG/DL (ref 74–106)
HCT VFR BLD CALC: 38 % (ref 35.4–49)
HDLC SERPL-MCNC: 46 MG/DL (ref 40–60)
HGB BLD-MCNC: 12.1 GM/DL (ref 11.7–16.9)
INR BLD: 1.28 (ref 0.83–1.09)
LDLC SERPL CALC-MCNC: 70 MG/DL (ref 5–100)
LYMPHOCYTES # BLD: 22.2 % (ref 8–40)
MAGNESIUM SERPL-MCNC: 2 MG/DL (ref 1.8–2.4)
MCH RBC QN AUTO: 30 PG (ref 25.7–33.7)
MCHC RBC AUTO-ENTMCNC: 31.9 G/DL (ref 32–35.9)
MCV RBC: 94 FL (ref 80–96)
MONOCYTES # BLD AUTO: 10.8 % (ref 3.8–10.2)
NEUTROPHILS # BLD: 59.1 % (ref 42.8–82.8)
PHOSPHATE SERPL-MCNC: 3.5 MG/DL (ref 2.5–4.9)
PLATELET # BLD AUTO: 219 K/MM3 (ref 134–434)
PMV BLD: 8.6 FL (ref 7.5–11.1)
POTASSIUM SERPLBLD-SCNC: 4.3 MMOL/L (ref 3.5–5.1)
PROT SERPL-MCNC: 6.4 G/DL (ref 6.4–8.2)
PT PNL PPP: 15.1 SEC (ref 9.7–13)
RBC # BLD AUTO: 4.04 M/MM3 (ref 4–5.6)
SODIUM SERPL-SCNC: 142 MMOL/L (ref 136–145)
TRIGL SERPL-MCNC: 48 MG/DL (ref 0–150)
WBC # BLD AUTO: 7.5 K/MM3 (ref 4–10)

## 2020-06-24 RX ADMIN — INSULIN ASPART SCH: 100 INJECTION, SOLUTION INTRAVENOUS; SUBCUTANEOUS at 22:03

## 2020-06-24 RX ADMIN — Medication SCH MG: at 09:38

## 2020-06-24 RX ADMIN — NICOTINE SCH: 14 PATCH, EXTENDED RELEASE TRANSDERMAL at 09:39

## 2020-06-24 RX ADMIN — FOLIC ACID SCH MG: 1 TABLET ORAL at 09:38

## 2020-06-24 RX ADMIN — INSULIN ASPART SCH UNITS: 100 INJECTION, SOLUTION INTRAVENOUS; SUBCUTANEOUS at 10:28

## 2020-06-24 RX ADMIN — MULTIVITAMIN TABLET SCH TAB: TABLET at 09:38

## 2020-06-24 RX ADMIN — BUDESONIDE AND FORMOTEROL FUMARATE DIHYDRATE SCH: 160; 4.5 AEROSOL RESPIRATORY (INHALATION) at 23:30

## 2020-06-24 RX ADMIN — INSULIN ASPART SCH UNITS: 100 INJECTION, SOLUTION INTRAVENOUS; SUBCUTANEOUS at 15:29

## 2020-06-24 RX ADMIN — SODIUM CHLORIDE, POTASSIUM CHLORIDE, SODIUM LACTATE AND CALCIUM CHLORIDE SCH MLS/HR: 600; 310; 30; 20 INJECTION, SOLUTION INTRAVENOUS at 10:30

## 2020-06-24 RX ADMIN — INSULIN ASPART SCH: 100 INJECTION, SOLUTION INTRAVENOUS; SUBCUTANEOUS at 07:55

## 2020-06-24 RX ADMIN — ENOXAPARIN SODIUM SCH MG: 40 INJECTION SUBCUTANEOUS at 09:38

## 2020-06-24 NOTE — PN
Teaching Attending Note


Name of Resident: Clarke Amezcua





ATTENDING PHYSICIAN STATEMENT





I saw and evaluated the patient.


I reviewed the resident's note and discussed the case with the resident.


I agree with the resident's findings and plan as documented.








SUBJECTIVE:


No fever or chills. No HA, noCP , no SOB , he feels better .  repe[place] being [place] 

the bus station and feeling hot and decided to lay down, then found lots of 

people around him then he was brought him here 


has pain in RUE. he reports following with ortho in May, and was told no surgery

is to be done on his arm 


pulled his IV earlier this am . 





OBJECTIVE:


NAD, awake, alert, oriented x 3 .cooperative . No facial droop. dry MM


CV: RRR


Lungs: CTAB 


Ext: no edema or erythema on upper or lower extremities.  R upper humer with 

slight deformity and tenderness to palpation . no erythema. brandt can lift arm x 90

degrees.  RP 2+ b/l. 








ASSESSMENT AND PLAN:





68 y/o man with h/o COPD , CVA , EtOH use disorder, DM, Right proximal Humerus 

Fx in 3/20 who presented with alcohol intoxication 





1- ETOH intoxication.  expect withdrawal if ot treated. 


- given IV thiamine , electrolytes are normal 


- IVF 


- start librium protocol 


- po thiamine, MVT and folate 


- No need for tele . EKG reviewed. 





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 seen by ortho . No surgical 

procedure is indicated . full note to be transcribed 





 3- H/o DM. he takes no meds at home . 


- use [facility] here 


- can dc on po meds when ready 





4- mediastinal air seen on CT scan. no acuteintervention appreciate Pulm help. 

d/w Dr. annia REAL follow up . pulm f.u 





5- Mdiastinal lymphadenopathy, and pulm opacities, f/u as out pt .with 2 month 

CT scan 





6- T3 lesion in vertebral body. CT in 2 months . 





will dc to [facility] if possible franck m 


COVID  pending

## 2020-06-24 NOTE — CONSULT
Consult - text type





- Consultation


Consultation Note: 





full consult dictated





imp: nonunion r prox humerus fx - no pain





plan: nothing to do.  DC and f/u prn

## 2020-06-24 NOTE — CON.PULM
Consult


Consult Specialty:: PULM/CCM 


Referred by:: Hospitalist


Reason for Consultation:: Abnormal CT chest





- History of Present Illness


Chief Complaint: S/P Fall


History of Present Illness: 


69 M, COPD due to smoking, CVA, , EtOH use disorder, and DM. 





Admitetd via the ER due to EtOH intoxication and CP. 





Reports he fell asleep on the ground waiting for he bus. 





He next thing he recalls is that being in an ambulance. 





Reports non-reproducible CP, 5/10.





No hemoptysis or night sweats. 





Last drink was this AM. 





CT: extensive chronic changes in the Apices Right > Left (probably chronic 

aspiration).  Nonspecific 1.5 x .5 cm RLL irregular opacity.  Minimal right 

clavicular air. 





- History Source


History Provided By: Patient, Medical Record


Limitations to Obtaining History: Poor Historian





- Past Medical History


Pulmonary: Yes: Bronchitis, Pneumonia





- Alcohol/Substance Use


Hx Alcohol Use: Yes (Vodka)


Number of Drinks Daily: 3


Date of Last Use: 12/04/19





- Smoking History


Smoking history: Current every day smoker


Have you smoked in the past 12 months: Yes


Aproximately how many cigarettes per day: 10





- Social History


ADL: Independent


Occupation: unemployed


History of Recent Travel: No





Home Medications





- Allergies


Allergies/Adverse Reactions: 


                                    Allergies











Allergy/AdvReac Type Severity Reaction Status Date / Time


 


No Known Drug Allergies Allergy   Verified 06/24/20 10:35














- Home Medications


Home Medications: 


Ambulatory Orders





Folic Acid 1 mg PO DAILY #30 tablet 03/22/20 


Folic Acid - 1 mg PO DAILY #60 tablet 03/22/20 


Gabapentin [Neurontin] 100 mg PO DAILY #30 capsule 03/22/20 


Ibuprofen 800 mg PO TID #90 tablet 03/22/20 


Metformin HCl [Glucophage] 500 mg PO DAILY #30 tablet 03/22/20 


NK [No Known Home Medication]  05/10/20 











Review of Systems





- Review of Systems


Constitutional: reports: Malaise.  denies: Chills, Fever, Night Sweats, 

Unintentional Wgt. Loss


Eyes: reports: No Symptoms


HENT: reports: No Symptoms


Neck: reports: No Symptoms


Cardiovascular: reports: Chest Pain, Shortness of Breath.  denies: Edema, 

Palpitations


Respiratory: reports: Cough, SOB, SOB on Exertion.  denies: Orthopnea, PND, 

Snoring, Wheezing


Gastrointestinal: reports: No Symptoms


Genitourinary: reports: No Symptoms


Breasts: reports: No Symptoms Reported


Musculoskeletal: reports: No Symptoms


Integumentary: reports: No Symptoms


Neurological: reports: No Symptoms


Endocrine: reports: No Symptoms


Hematology/Lymphatic: reports: No Symptoms


Psychiatric: reports: No Symptoms





Physical Exam


Vital Sings: 


                                   Vital Signs











Temperature  97.5 F L  06/24/20 11:30


 


Pulse Rate  74   06/24/20 11:30


 


Respiratory Rate  19   06/24/20 11:30


 


Blood Pressure  132/72   06/24/20 11:30


 


O2 Sat by Pulse Oximetry (%)  100   06/24/20 11:30











Constitutional: Yes: No Distress, Thin


Eyes: Yes: Conjunctiva Clear, EOM Intact


HENT: Yes: Atraumatic, Normocephalic


Neck: Yes: Supple, Trachea Midline


Cardiovascular: Yes: Regular Rate and Rhythm


Respiratory: Yes: Diminished, Rhonchi.  No: Accessory Muscle Use, Rales, SOB, 

SOB on Exertion, Stridor, Tachypnea, Wheezes


...Inspection: Yes: WNL


...Clubbing: No


Gastrointestinal: Yes: Normal Bowel Sounds, Soft, Tenderness, Rebound


Musculoskeletal: Yes: WNL, Joint Stiffness, Joint Swelling, Muscle Pain


Extremities: Yes: WNL


Edema: No


Peripheral Pulses WNL: Yes


Integumentary: Yes: WNL


Neurological: Yes: Alert


...Motor Strength: WNL


Psychiatric: Yes: Alert


Labs: 


                                    CBC, BMP





                                 06/24/20 05:30 





                                 06/24/20 05:30 











Imaging





- Results


Chest X-ray: Report Reviewed, Image Reviewed


Cat Scan: Report Reviewed, Image Reviewed





Problem List





- Problems


(1) COPD (chronic obstructive pulmonary disease)


Code(s): J44.9 - CHRONIC OBSTRUCTIVE PULMONARY DISEASE, UNSPECIFIED   





(2) Elevated ETOH level


Code(s): R78.0 - FINDING OF ALCOHOL IN BLOOD   


Qualifiers: 


   Blood alcohol level: level not specified   Qualified Code(s): R78.0 - Finding

of alcohol in blood   





(3) Shoulder fracture, right


Code(s): S42.91XA - FRACTURE OF RIGHT SHOULDER GIRDLE, PART UNSP, INIT   


Qualifiers: 


   Encounter type: subsequent encounter   Fracture type: closed   Fracture 

healing: with malunion   Qualified Code(s): S42.91XP - Fracture of right 

shoulder girdle, part unspecified, subsequent encounter for fracture with 

malunion   





(4) Alcohol abuse


Code(s): F10.10 - ALCOHOL ABUSE, UNCOMPLICATED   





(5) Right upper lobe consolidation


Code(s): J18.1 - LOBAR PNEUMONIA, UNSPECIFIED ORGANISM   





(6) Severe protein-calorie malnutrition


Code(s): E43 - UNSPECIFIED SEVERE PROTEIN-CALORIE MALNUTRITION   





(7) Shortness of breath


Code(s): R06.02 - SHORTNESS OF BREATH   





(8) Smoker


Code(s): F17.200 - NICOTINE DEPENDENCE, UNSPECIFIED, UNCOMPLICATED   





Assessment/Plan


Nonspecific air noted on CT imaging possibly due to wretching or local trauma.  

Will resolve spontaneously. 


No ABX at this time 


No smoking counseled 


No indication for systemic steroids 


BD TX PRN 


Will need to follow RLL opacity (Can have PET imaging if compliant)


There was a previous concern for P TB in 2019.  At present no active symptoms 

but he should follow with [facility] as some work up was performed in 2019. 


Will follow 


DC planning 





Thank you.





Dr Phipps

## 2020-06-24 NOTE — PN
Physical Exam: 


SUBJECTIVE: Patient seen and examined. He currently denies nausea, vomiting, or 

pain.








OBJECTIVE:





                                   Vital Signs











 Period  Temp  Pulse  Resp  BP Sys/Michael  Pulse Ox


 


 Last 24 Hr  97.3 F-98.2 F  72-97  16-19  /49-72  











GENERAL: The patient is awake, alert, and fully oriented, in no acute distress.


HEAD: Normal with no signs of trauma.


EYES: PERRL, extraocular movements intact, sclera anicteric, conjunctiva clear. 


ENT: Ears normal, nares patent, moist mucous membranes.


NECK: Trachea midline.


LUNGS: Clear to auscultation bilaterally, no wheezes.


HEART: Regular rate and rhythm, S1, S2 without murmur.


ABDOMEN: Soft, nontender, nondistended, normoactive bowel sounds.


EXTREMITIES: Warm, well-perfused, no edema. 


NEUROLOGICAL: Cranial nerves II through XII grossly intact. Slow speech.


PSYCH: Labile mood.


SKIN: Warm, dry, normal turgor.














                         Laboratory Results - last 24 hr











  06/23/20 06/23/20 06/23/20





  18:15 18:30 18:30


 


WBC   8.2 


 


RBC   4.25 


 


Hgb   12.9 


 


Hct   39.7 


 


MCV   93.5 


 


MCH   30.4 


 


MCHC   32.5 


 


RDW   13.2 


 


Plt Count   265 


 


MPV   8.3 


 


Absolute Neuts (auto)   5.7 


 


Neutrophils %   68.9 


 


Lymphocytes %   17.0  D 


 


Monocytes %   7.4 


 


Eosinophils %   5.9 H 


 


Basophils %   0.8 


 


Nucleated RBC %   0 


 


PT with INR    Cancelled


 


INR    Cancelled


 


Sodium   


 


Potassium   


 


Chloride   


 


Carbon Dioxide   


 


Anion Gap   


 


BUN   


 


Creatinine   


 


Est GFR (CKD-EPI)AfAm   


 


Est GFR (CKD-EPI)NonAf   


 


POC Glucometer  193  


 


Random Glucose   


 


Hemoglobin A1c %   


 


Calcium   


 


Phosphorus   


 


Magnesium   


 


Total Bilirubin   


 


AST   


 


ALT   


 


Alkaline Phosphatase   


 


Creatine Kinase   


 


Troponin I   


 


Total Protein   


 


Albumin   


 


Triglycerides   


 


Cholesterol   


 


Total LDL Cholesterol   


 


HDL Cholesterol   


 


TSH   


 


Alcohol, Quantitative   














  06/23/20 06/24/20 06/24/20





  18:30 01:00 05:30


 


WBC    7.5


 


RBC    4.04


 


Hgb    12.1


 


Hct    38.0


 


MCV    94.0


 


MCH    30.0


 


MCHC    31.9 L


 


RDW    12.5


 


Plt Count    219


 


MPV    8.6


 


Absolute Neuts (auto)    4.4


 


Neutrophils %    59.1


 


Lymphocytes %    22.2  D


 


Monocytes %    10.8 H


 


Eosinophils %    7.1 H


 


Basophils %    0.8


 


Nucleated RBC %    0


 


PT with INR   


 


INR   


 


Sodium  140  


 


Potassium  3.8  


 


Chloride  105  


 


Carbon Dioxide  25  


 


Anion Gap  10  


 


BUN  17.9  


 


Creatinine  0.7  


 


Est GFR (CKD-EPI)AfAm  110.82  


 


Est GFR (CKD-EPI)NonAf  95.61  


 


POC Glucometer   


 


Random Glucose  198 H  


 


Hemoglobin A1c %   


 


Calcium  8.7  


 


Phosphorus  3.8  


 


Magnesium  2.1  


 


Total Bilirubin  0.4  


 


AST  15  


 


ALT  16  


 


Alkaline Phosphatase  102  


 


Creatine Kinase  90  


 


Troponin I  < 0.02  < 0.02 


 


Total Protein  7.5  


 


Albumin  3.2 L  


 


Triglycerides   


 


Cholesterol   


 


Total LDL Cholesterol   


 


HDL Cholesterol   


 


TSH   


 


Alcohol, Quantitative  118.9 H  














  06/24/20 06/24/20 06/24/20





  05:30 05:30 05:30


 


WBC   


 


RBC   


 


Hgb   


 


Hct   


 


MCV   


 


MCH   


 


MCHC   


 


RDW   


 


Plt Count   


 


MPV   


 


Absolute Neuts (auto)   


 


Neutrophils %   


 


Lymphocytes %   


 


Monocytes %   


 


Eosinophils %   


 


Basophils %   


 


Nucleated RBC %   


 


PT with INR  15.10 H  


 


INR  1.28 H  


 


Sodium   142 


 


Potassium   4.3 


 


Chloride   111 H 


 


Carbon Dioxide   26 


 


Anion Gap   5 L 


 


BUN   15.3 


 


Creatinine   0.6 


 


Est GFR (CKD-EPI)AfAm   118.07 


 


Est GFR (CKD-EPI)NonAf   101.87 


 


POC Glucometer   


 


Random Glucose   153 H 


 


Hemoglobin A1c %    7.8 H


 


Calcium   8.2 L 


 


Phosphorus   3.5 


 


Magnesium   2.0 


 


Total Bilirubin   0.4 


 


AST   18 


 


ALT   12 L 


 


Alkaline Phosphatase   90 


 


Creatine Kinase   


 


Troponin I   


 


Total Protein   6.4 


 


Albumin   2.7 L 


 


Triglycerides   48 


 


Cholesterol   129 


 


Total LDL Cholesterol   70 


 


HDL Cholesterol   46 


 


TSH   0.72 


 


Alcohol, Quantitative   














  06/24/20 06/24/20





  07:29 10:28


 


WBC  


 


RBC  


 


Hgb  


 


Hct  


 


MCV  


 


MCH  


 


MCHC  


 


RDW  


 


Plt Count  


 


MPV  


 


Absolute Neuts (auto)  


 


Neutrophils %  


 


Lymphocytes %  


 


Monocytes %  


 


Eosinophils %  


 


Basophils %  


 


Nucleated RBC %  


 


PT with INR  


 


INR  


 


Sodium  


 


Potassium  


 


Chloride  


 


Carbon Dioxide  


 


Anion Gap  


 


BUN  


 


Creatinine  


 


Est GFR (CKD-EPI)AfAm  


 


Est GFR (CKD-EPI)NonAf  


 


POC Glucometer  130  197


 


Random Glucose  


 


Hemoglobin A1c %  


 


Calcium  


 


Phosphorus  


 


Magnesium  


 


Total Bilirubin  


 


AST  


 


ALT  


 


Alkaline Phosphatase  


 


Creatine Kinase  


 


Troponin I  


 


Total Protein  


 


Albumin  


 


Triglycerides  


 


Cholesterol  


 


Total LDL Cholesterol  


 


HDL Cholesterol  


 


TSH  


 


Alcohol, Quantitative  








Active Medications











Generic Name Dose Route Start Last Admin





  Trade Name Freq  PRN Reason Stop Dose Admin


 


Acetaminophen  650 mg  06/24/20 00:46 





  Tylenol -  PO  





  Q6H PRN  





  Fever Or Pain  


 


Albuterol/Ipratropium  1 amp  06/24/20 00:47 





  Duoneb -  NEB  





  Q6H PRN  





  SHORTNESS OF BREATH  


 


Chlordiazepoxide HCl  25 mg  06/24/20 13:00  06/24/20 13:30





  Librium -  PO  06/25/20 21:01  Not Given





  Q8H ROLY  


 


Chlordiazepoxide HCl  10 mg  06/27/20 00:00 





  Librium -  PO  06/27/20 23:59 





  Q12H PRN  





  Signs/symptoms of Withdrawal  


 


Chlordiazepoxide HCl  10 mg  06/24/20 13:16 





  Librium -  PO  06/26/20 23:59 





  Q8H PRN  





  Signs/symptoms of Withdrawal  


 


Chlordiazepoxide HCl  15 mg  06/26/20 05:00 





  Librium -  PO  06/26/20 21:01 





  Q8H ROLY  


 


Chlordiazepoxide HCl  10 mg  06/27/20 05:00 





  Librium -  PO  06/27/20 21:01 





  Q8H ROLY  


 


Chlordiazepoxide HCl  10 mg  06/28/20 05:00 





  Librium -  PO  06/28/20 05:01 





  ONCE ONE  


 


Enoxaparin Sodium  40 mg  06/24/20 10:00  06/24/20 09:38





  Lovenox -  SQ   40 mg





  DAILY ROLY   Administration


 


Folic Acid  1 mg  06/24/20 10:00  06/24/20 09:38





  Folic Acid -  PO   1 mg





  DAILY ROLY   Administration


 


Lactated Ringer's  1,000 ml in 1,000 mls @ 75 mls/hr  06/24/20 10:30  06/24/20 

10:30





  Lactated Ringers Solution  IV   75 mls/hr





  ASDIR ROLY   Administration


 


Insulin Aspart  1 vial  06/24/20 07:00  06/24/20 10:28





  Novolog Vial Sliding Scale -  SQ   2 units





  ACHS ROLY   Administration





  Protocol  


 


Multivitamins/Minerals/Vitamin C  1 tab  06/24/20 10:00  06/24/20 09:38





  Tab-A-Vit -  PO   1 tab





  DAILY ROLY   Administration


 


Nicotine  14 mg  06/24/20 10:00  06/24/20 09:39





  Nicoderm Patch -  TD   Not Given





  DAILY [facility]  


 


Thiamine HCl  100 mg  06/24/20 10:00  06/24/20 09:38





  Vitamin B1 -  PO   100 mg





  DAILY ROLY   Administration











ASSESSMENT/PLAN:


Pt is a 68y/o male with ETOH use disorder, tobacco use disorder, COPD, and NIDDM

who presents following drinking and laying down on the ground while waiting for 

the bus. He was brought in by EMS.





#alcohol use disorder


-ETOH 119 in ED, combative


-CIWA 0 at this time but pt is very likely to begin withdrawal given ETOH level 

and amount he drinks daily


-Librium taper for detox


-thiamine


-folate


-MV


-will discuss option of rehab when pt is more amenable to conversation


-dietary consult





#right shoulder pain


-humeral fx in March


-not currently in pain at interview time


-ortho consult- nothing to do, f/u outpatient as needed





#COPD


-continue Symbicort BID


-duonebs PRN


-pulm consult- chronic changes in b/l apices R > L possibly from chronic 

aspiration, RLL 1.5cm x 1.5cm non-specific opacity, minimal right clavicular air


-will need repeat CT as outpatient





#tobacco use disorder


-nicotine patch


-tobacco cessation education





#NIDDM


-A1C 7.8


-hold home Janumet


-SSI


-BGMs





DVT Ppx


Lovenox





FEN


LR 75mL/hr


monitor labs


diabetic diet





dispo


med/surg





FULL CODE





Visit type





- Emergency Visit


Emergency Visit: Yes


ED Registration Date: 06/23/20


Care time: The patient presented to the Emergency Department on the above date 

and was hospitalized for further evaluation of their emergent condition.





- New Patient


This patient is new to me today: Yes


Date on this admission: 06/24/20





- Critical Care


Critical Care patient: No





ATTENDING PHYSICIAN STATEMENT





I saw and evaluated the patient.


I reviewed the resident's note and discussed the case with the resident.


I agree with the resident's findings and plan as documented.








SUBJECTIVE:








OBJECTIVE:








ASSESSMENT AND PLAN:

## 2020-06-24 NOTE — CONS
DATE OF CONSULTATION:  06/24/2020

 

ORTHOPEDIC CONSULTATION/[facility] 

 

HISTORY OF PRESENT ILLNESS:  Patient is a 69-year-old homeless alcoholic, presented

to the emergency room with EtOH on board, complaining of some vague pain in his right

shoulder and leg.  I examined him hours later when he had awoken from his sleep from

the alcohol and questioned him.  He said that his shoulder has been bothering him for

some time, but it is getting somewhat better.  His leg is not really bothering him

now at all.  He does give some history of fractures but cannot be more specific.

 

PHYSICAL EXAMINATION:

Musculoskeletal:  He has decreased range of motion of his right shoulder, but he can

forward flex with minimal pain to about 120, external rotation to about 50, internal

rotation _____.  Nontender clavicle, AC joint, acromion, bicipital groove.  Full

range of motion right elbow, wrist, and finger.  He has good range of motion of

bilateral hips, knees, ankles, and toes; 5/5 _____ reflexes, intact sensation

throughout.  

 

IMAGING:  X-rays taken today in the emergency room and compared to x-rays which were

performed at the beginning of May show that he has an impacted proximal humerus

fracture.  In May there was already callus formation, so the fracture had to be at

least somewhat before that.  Now there is more callus formation; however, it appears

that it is going on to a nonunion as it does not appear to be a bridging callus and

that he is developing pseudoarthrosis at the proximal humeral region as it impacted

into the humeral head.  X-rays of his femur show a united femoral shaft fracture that

is many years old.  

 

IMPRESSION:  Nonunion, right shoulder, with minimal pain and some decreased range of

motion.

 

RECOMMENDATIONS:  Patient would benefit from some physical therapy.  This can be

arranged upon discharge.  He can follow up in my office and I can arrange that as

well.  Patient can be discharged from the hospital if he is medically okay.  

 

 

YUNG CORREA M.D.

 

[id number]

DD: 06/24/2020 09:42

DT: 06/24/2020 10:40

Job #:  [id number]

## 2020-06-24 NOTE — ECHO
______________________________________________________________________________



Name: GAMALIEL JOSE DAVID JEFFERS                               Exam:Adult Echocardiogram

MRN: [id number]             Study Date: 2020 08:26 AM

Age: 69 yrs

______________________________________________________________________________



Reason For Study: LV Function

Height: 64 in        Weight: 100 lb        BSA: 1.5 m2



______________________________________________________________________________



MMode/2D Measurements & Calculations

IVSd: 0.76 cm                                         Ao root diam: 2.9 cm

LVIDd: 4.1 cm                                         LA dimension: 2.2 cm

LVIDs: 2.4 cm

LVPWd: 0.84 cm



_______________________________________________________

EDV(Teich): 72.5 ml                                   LVOT diam: 2.0 cm

ESV(Teich): 21.0 ml



Doppler Measurements & Calculations

MV E max corey: 98.5 cm/sec                                 Ao V2 max: 148.0 cm/sec

MV A max corey: 100.4 cm/sec                                Ao max P.8 mmHg

MV E/A: 0.98

MV dec time: 0.25 sec                                     SOSA(V,D): 1.7 cm2



___________________________________________________________

LV V1 max P.9 mmHg                                    MR max corey: 523.3 cm/sec

LV V1 max: 84.9 cm/sec                                    MR max P.5 mmHg



___________________________________________________________

TR max corey: 271.5 cm/sec                                  PA V2 max: 92.3 cm/sec

TR max P.5 mmHg                                      PA max PG: 3.4 mmHg



___________________________________________________________

Med Peak E' Corey: 5.7 cm/sec

Med E/e': 17.4

Lat Peak E' Corey: 5.2 cm/sec

Lat E/e': 18.9





______________________________________________________________________________

Procedure

The study was technically limited with all images being suboptimal in quality. The patient was in nor
mal sinus

rhythm during the exam.

Left Ventricle

The left ventricular size, thickness and function are normal. Ejection Fraction = 60%. The transmitra
l

spectral Doppler flow pattern is suggestive of impaired LV relaxation.

Right Ventricle

The right ventricle is not well visualized.

Atria

The left atrial size is normal. Right atrium not well visualized.

Mitral Valve

There is mild mitral annular calcification. There is mild mitral regurgitation.

Tricuspid Valve

The tricuspid valve is not well visualized, but is grossly normal. There is mild tricuspid regurgitat
ion.

Right ventricular systolic pressure is elevated at 30-40mmHg.

Aortic Valve

There is moderate aortic sclerosis.;. No hemodynamically significant valvular aortic stenosis.

Pulmonic Valve

The pulmonic valve is not well visualized.

Great Vessels

The aortic root is normal size.

Pericardium/Pleura

Cannot adequately assess for pericardial effusion.

______________________________________________________________________________





Interpretation Summary

The left ventricular size, thickness and function are normal

The study was technically limited with all images being suboptimal in quality.

The transmitral spectral Doppler flow pattern is suggestive of impaired LV relaxation.

The right ventricle is not well visualized.

The left atrial size is normal.

There is mild mitral annular calcification.

There is mild mitral regurgitation.

There is mild tricuspid regurgitation.

Right ventricular systolic pressure is elevated at 30-40mmHg.

No hemodynamically significant valvular aortic stenosis.

There is moderate aortic sclerosis.;





MD Freddy Meraz 2020 01:24 PM

## 2020-06-24 NOTE — HP
CHIEF COMPLAINT: Chest Pain





PCP:





HISTORY OF PRESENT ILLNESS:


70 y/o M PMHx of COPD (unclear if on Home O2), CVA (no residual deficits), EtOH 

use disorder, DM, Right proximal Humerus Fx presents with EtOH intoxication and 

Chest pain. Patient says he woke in his usual state of health this AM. He 

completed his errands for the day and while sitting on the ground waiting for 

the bus, he says he fell asleep. No preceeding symptoms including 

lightheadedness, dizziness or chest pain however patient did feel hot. The next 

memory he is able to recall is being in the ambulance on his way to [facility]. 

Patient now mentions of having chest pain described as 5/10 over his left chest,

nonreproducible, that comes and goes, without radiation for the past 2 weeks. 

The pain occurred while at rest and improves with tylenol.





At this point in the interview, patient became extremely agitated and abruptly 

refused to answer any further questions during my interview thus the majority of

the HPI is obtained from chart review. As per chart review, patients last drink 

was today and he consumed 1 bottle of liquor.





ER course was notable for:


(1) 1L LR, 1L NS, Ofirmev


(2) 


(3)





Recent Travel:


Refuses to answer 





PAST MEDICAL HISTORY:


As per HPI





PAST SURGICAL HISTORY:


Appendectomy


RLE Repair with Rods





Social History:


Smokin.5-1 ppd (as per chart review)


Alcohol: Unable to quantify but last drink 1 bottle today 


Drugs: Denies





Allergies





No Known Drug Allergies Allergy (Verified 20 18:20)


   








HOME MEDICATIONS:


                                Home Medications











 Medication  Instructions  Recorded


 


NK [No Known Home Medication]  05/10/20








REVIEW OF SYSTEMS


Please refer to ED Note for ROS as patient refused to answer questions during my

interview.





PHYSICAL EXAMINATION


                               Vital Signs - 24 hr











  20





  17:45


 


Temperature 97.9 F


 


Pulse Rate 95 H


 


Respiratory 16





Rate 


 


Blood Pressure 91/56 L


 


O2 Sat by Pulse 97





Oximetry (%) 











GENERAL: Slurring speech, Disheveled


HEAD: Cachectic appearing, Mild temporal wasting


PSYCHIATRIC: Agitated


Exam is limited as patient refused physical examination





                         Laboratory Results - last 24 hr











  20





  18:15 18:30 18:30


 


WBC   8.2 


 


RBC   4.25 


 


Hgb   12.9 


 


Hct   39.7 


 


MCV   93.5 


 


MCH   30.4 


 


MCHC   32.5 


 


RDW   13.2 


 


Plt Count   265 


 


MPV   8.3 


 


Absolute Neuts (auto)   5.7 


 


Neutrophils %   68.9 


 


Lymphocytes %   17.0  D 


 


Monocytes %   7.4 


 


Eosinophils %   5.9 H 


 


Basophils %   0.8 


 


Nucleated RBC %   0 


 


PT with INR    Cancelled


 


INR    Cancelled


 


Sodium   


 


Potassium   


 


Chloride   


 


Carbon Dioxide   


 


Anion Gap   


 


BUN   


 


Creatinine   


 


Est GFR (CKD-EPI)AfAm   


 


Est GFR (CKD-EPI)NonAf   


 


POC Glucometer  193  


 


Random Glucose   


 


Calcium   


 


Phosphorus   


 


Magnesium   


 


Total Bilirubin   


 


AST   


 


ALT   


 


Alkaline Phosphatase   


 


Creatine Kinase   


 


Troponin I   


 


Total Protein   


 


Albumin   


 


Alcohol, Quantitative   














  20





  18:30


 


WBC 


 


RBC 


 


Hgb 


 


Hct 


 


MCV 


 


MCH 


 


MCHC 


 


RDW 


 


Plt Count 


 


MPV 


 


Absolute Neuts (auto) 


 


Neutrophils % 


 


Lymphocytes % 


 


Monocytes % 


 


Eosinophils % 


 


Basophils % 


 


Nucleated RBC % 


 


PT with INR 


 


INR 


 


Sodium  140


 


Potassium  3.8


 


Chloride  105


 


Carbon Dioxide  25


 


Anion Gap  10


 


BUN  17.9


 


Creatinine  0.7


 


Est GFR (CKD-EPI)AfAm  110.82


 


Est GFR (CKD-EPI)NonAf  95.61


 


POC Glucometer 


 


Random Glucose  198 H


 


Calcium  8.7


 


Phosphorus  3.8


 


Magnesium  2.1


 


Total Bilirubin  0.4


 


AST  15


 


ALT  16


 


Alkaline Phosphatase  102


 


Creatine Kinase  90


 


Troponin I  < 0.02


 


Total Protein  7.5


 


Albumin  3.2 L


 


Alcohol, Quantitative  118.9 H











ASSESSMENT/PLAN:


70 y/o M PMHx of COPD (unclear if on Home O2), CVA (no residual deficits), EtOH 

use disorder, DM, Right proximal Humerus Fx presents with EtOH intoxication and 

Chest pain.





#EtOH Intoxication


-In the setting of EtOH use disorder, EtOH level 118, CT Head/C-Spine negative 

for fracture


-Given 2L IVF in ED; Start Banana bag


-MVI/Thiamine/Folate


-Check PT and calculate Discriminant function


-Can begin Librium protocol once patient is sober and able to calculate CIWA 

(Currently patient refuses CIWA Questionnaire)


-Fall/Seizure/Dysphagia Precautions, HOB elevated, neurochecks


-Consider Detox consult





#Chest Pain


-Unclear etiology as patient refuses to participate in hx/examination however 

will need to r/o ACS


-Trop < 0.02 x 1, Trend Trops


-EKG: NSR, Biatrial Enlargement, LAFB, VR 97, QTc 454


-check TSH, A1c, Lipid Panel, Echo


-Tele





#Right Proximal Humerus Fx


-After a fall as per prior hx. Ortho previously recommended no surgical 

intervention however patient has not followed up outpatient with ortho since for

repeat imaging


-Shoulder XRay pending offical read


-Ortho (Dr. Kaufman) consulted


-Analgesia





#Enlarged Mediastinal LN


-In the setting of current tobacco use with hx of COPD, TB Ruled out previously 

(AFB Neg x 2)


-CT Chest reveals chronic opacities in the upper lobes. CXR reveals upper lobe 

opacities and mediastinal deviation. Lung nodules have been noted on prior 

imaging


-Inhaled Bronchodilators


-Nicoderm patch


-Supplemental O2 to maintain spo2 88-92%


-Consult Pulmonology


-Will likely need out patient PFTs





#Severe Protien calorie malnutrition


-BMI 17.2, Albumin 3.2, Chronic EtOH use disorder


-Dietary consult


-Supplemental nutrition (Glucerna)





#IDDM


-ISS BGMs ACHS





#FEN


-Banana bag


-Replete Lytes PRN


-Diabetic Diet





#PPx


-DVT: Lovenox





Dispo: Admit to Tele











Visit type





- Emergency Visit


Emergency Visit: Yes


ED Registration Date: 20


Care time: The patient presented to the Emergency Department on the above date 

and was hospitalized for further evaluation of their emergent condition.





- New Patient


This patient is new to me today: Yes


Date on this admission: 20





- Critical Care


Critical Care patient: No





ATTENDING PHYSICIAN STATEMENT





I saw and evaluated the patient.


I reviewed the resident's note and discussed the case with the resident.


I agree with the resident's findings and plan as documented.








SUBJECTIVE:








OBJECTIVE:








ASSESSMENT AND PLAN:

## 2020-06-24 NOTE — EKG
Test Reason : 

Blood Pressure : ***/*** mmHG

Vent. Rate : 097 BPM     Atrial Rate : 097 BPM

   P-R Int : 122 ms          QRS Dur : 108 ms

    QT Int : 358 ms       P-R-T Axes : 069 -77 077 degrees

   QTc Int : 454 ms

 

NORMAL SINUS RHYTHM

BIATRIAL ENLARGEMENT

PULMONARY DISEASE PATTERN

LEFT ANTERIOR FASCICULAR BLOCK

ABNORMAL ECG

WHEN COMPARED WITH ECG OF 10-MAY-2020 19:31,

NO SIGNIFICANT CHANGE WAS FOUND

Confirmed by MD Mariya, Freddy ([id number]) on 6/24/2020 1:38:06 PM

 

Referred By:             Confirmed By:Freddy Meraz MD

## 2020-06-25 LAB
ALBUMIN SERPL-MCNC: 2.8 G/DL (ref 3.4–5)
ALP SERPL-CCNC: 87 U/L (ref 45–117)
ALT SERPL-CCNC: 11 U/L (ref 13–61)
ANION GAP SERPL CALC-SCNC: 7 MMOL/L (ref 8–16)
AST SERPL-CCNC: 13 U/L (ref 15–37)
BASOPHILS # BLD: 0.7 % (ref 0–2)
BILIRUB SERPL-MCNC: 0.4 MG/DL (ref 0.2–1)
BUN SERPL-MCNC: 14.9 MG/DL (ref 7–18)
CALCIUM SERPL-MCNC: 8.3 MG/DL (ref 8.5–10.1)
CHLORIDE SERPL-SCNC: 104 MMOL/L (ref 98–107)
CO2 SERPL-SCNC: 23 MMOL/L (ref 21–32)
CREAT SERPL-MCNC: 0.5 MG/DL (ref 0.55–1.3)
DEPRECATED RDW RBC AUTO: 12.8 % (ref 11.9–15.9)
EOSINOPHIL # BLD: 4.5 % (ref 0–4.5)
GLUCOSE SERPL-MCNC: 220 MG/DL (ref 74–106)
HCT VFR BLD CALC: 36.7 % (ref 35.4–49)
HGB BLD-MCNC: 12 GM/DL (ref 11.7–16.9)
LYMPHOCYTES # BLD: 16.8 % (ref 8–40)
MAGNESIUM SERPL-MCNC: 2 MG/DL (ref 1.8–2.4)
MCH RBC QN AUTO: 30.4 PG (ref 25.7–33.7)
MCHC RBC AUTO-ENTMCNC: 32.6 G/DL (ref 32–35.9)
MCV RBC: 93.2 FL (ref 80–96)
MONOCYTES # BLD AUTO: 8.9 % (ref 3.8–10.2)
NEUTROPHILS # BLD: 69.1 % (ref 42.8–82.8)
PHOSPHATE SERPL-MCNC: 2.9 MG/DL (ref 2.5–4.9)
PLATELET # BLD AUTO: 226 K/MM3 (ref 134–434)
PMV BLD: 9.1 FL (ref 7.5–11.1)
POTASSIUM SERPLBLD-SCNC: 4.2 MMOL/L (ref 3.5–5.1)
PROT SERPL-MCNC: 6.7 G/DL (ref 6.4–8.2)
RBC # BLD AUTO: 3.94 M/MM3 (ref 4–5.6)
SODIUM SERPL-SCNC: 134 MMOL/L (ref 136–145)
WBC # BLD AUTO: 7.7 K/MM3 (ref 4–10)

## 2020-06-25 RX ADMIN — ENOXAPARIN SODIUM SCH MG: 40 INJECTION SUBCUTANEOUS at 09:17

## 2020-06-25 RX ADMIN — ASPIRIN SCH MG: 81 TABLET, COATED ORAL at 09:18

## 2020-06-25 RX ADMIN — Medication SCH MG: at 09:18

## 2020-06-25 RX ADMIN — INSULIN ASPART SCH UNITS: 100 INJECTION, SOLUTION INTRAVENOUS; SUBCUTANEOUS at 10:54

## 2020-06-25 RX ADMIN — BUDESONIDE AND FORMOTEROL FUMARATE DIHYDRATE SCH PUFF: 160; 4.5 AEROSOL RESPIRATORY (INHALATION) at 09:21

## 2020-06-25 RX ADMIN — BUDESONIDE AND FORMOTEROL FUMARATE DIHYDRATE SCH: 160; 4.5 AEROSOL RESPIRATORY (INHALATION) at 22:01

## 2020-06-25 RX ADMIN — MULTIVITAMIN TABLET SCH TAB: TABLET at 09:18

## 2020-06-25 RX ADMIN — SODIUM CHLORIDE, POTASSIUM CHLORIDE, SODIUM LACTATE AND CALCIUM CHLORIDE SCH MLS/HR: 600; 310; 30; 20 INJECTION, SOLUTION INTRAVENOUS at 00:45

## 2020-06-25 RX ADMIN — ACETAMINOPHEN PRN MG: 325 TABLET ORAL at 12:58

## 2020-06-25 RX ADMIN — NICOTINE SCH MG: 14 PATCH, EXTENDED RELEASE TRANSDERMAL at 09:18

## 2020-06-25 RX ADMIN — SODIUM CHLORIDE, POTASSIUM CHLORIDE, SODIUM LACTATE AND CALCIUM CHLORIDE SCH: 600; 310; 30; 20 INJECTION, SOLUTION INTRAVENOUS at 10:50

## 2020-06-25 RX ADMIN — INSULIN ASPART SCH UNITS: 100 INJECTION, SOLUTION INTRAVENOUS; SUBCUTANEOUS at 06:07

## 2020-06-25 RX ADMIN — FOLIC ACID SCH MG: 1 TABLET ORAL at 09:17

## 2020-06-25 RX ADMIN — INSULIN ASPART SCH UNITS: 100 INJECTION, SOLUTION INTRAVENOUS; SUBCUTANEOUS at 16:43

## 2020-06-25 RX ADMIN — INSULIN ASPART SCH UNITS: 100 INJECTION, SOLUTION INTRAVENOUS; SUBCUTANEOUS at 21:59

## 2020-06-25 NOTE — PN
Progress Note (short form)





- Note


Progress Note: 


Resting in NAD. 


Awake and alert but remains mildly confused. 


Breathing non-labored.





                                 Intake & Output











 06/22/20 06/23/20 06/24/20 06/25/20





 23:59 23:59 23:59 23:59


 


Intake Total   400 525


 


Balance   400 525


 


Weight  100 lb 101 lb 12.8 oz 








                                Last Vital Signs











Temp Pulse Resp BP Pulse Ox


 


 98.0 F   76   18   114/62   96 


 


 06/25/20 13:56  06/25/20 13:56  06/25/20 13:56  06/25/20 13:56  06/25/20 09:00








Active Medications





Acetaminophen (Tylenol -)  650 mg PO Q6H PRN


   PRN Reason: Fever Or Pain


   Last Admin: 06/25/20 12:58 Dose:  650 mg


   Documented by: 


Albuterol/Ipratropium (Duoneb -)  1 amp NEB Q6H PRN


   PRN Reason: SHORTNESS OF BREATH


Aspirin (Ecotrin -)  81 mg PO DAILY [facility]


   Last Admin: 06/25/20 09:18 Dose:  81 mg


   Documented by: 


Budesonide/Formoterol Fumarate (Symbicort 160/4.5mcg -)  2 puff IH BID [facility]


   Last Admin: 06/25/20 09:21 Dose:  2 puff


   Documented by: 


Chlordiazepoxide HCl (Librium -)  25 mg PO Q8H [facility]


   Stop: 06/25/20 21:01


   Last Admin: 06/25/20 12:40 Dose:  25 mg


   Documented by: 


Chlordiazepoxide HCl (Librium -)  10 mg PO Q12H PRN


   PRN Reason: Signs/symptoms of Withdrawal


   Stop: 06/27/20 23:59


Chlordiazepoxide HCl (Librium -)  10 mg PO Q8H PRN


   PRN Reason: Signs/symptoms of Withdrawal


   Stop: 06/26/20 23:59


Chlordiazepoxide HCl (Librium -)  15 mg PO Q8H [facility]


   Stop: 06/26/20 21:01


Chlordiazepoxide HCl (Librium -)  10 mg PO Q8H [facility]


   Stop: 06/27/20 21:01


Chlordiazepoxide HCl (Librium -)  10 mg PO ONCE ONE


   Stop: 06/28/20 05:01


Enoxaparin Sodium (Lovenox -)  40 mg SQ DAILY [facility]


   Last Admin: 06/25/20 09:17 Dose:  40 mg


   Documented by: 


Folic Acid (Folic Acid -)  1 mg PO DAILY [facility]


   Last Admin: 06/25/20 09:17 Dose:  1 mg


   Documented by: 


Lactated Ringer's (Lactated Ringers Solution)  1,000 ml in 1,000 mls @ 75 mls/hr

IV ASDIR [facility]


   Last Admin: 06/25/20 10:50 Dose:  Not Given


   Documented by: 


Insulin Aspart (Novolog Vial Sliding Scale -)  1 vial SQ ACHS [facility]; Protocol


   Last Admin: 06/25/20 10:54 Dose:  2 units


   Documented by: 


Multivitamins/Minerals/Vitamin C (Tab-A-Vit -)  1 tab PO DAILY [facility]


   Last Admin: 06/25/20 09:18 Dose:  1 tab


   Documented by: 


Nicotine (Nicoderm Patch -)  14 mg TD DAILY [facility]


   Last Admin: 06/25/20 09:18 Dose:  14 mg


   Documented by: 


Thiamine HCl (Vitamin B1 -)  100 mg PO DAILY [facility]


   Last Admin: 06/25/20 09:18 Dose:  100 mg


   Documented by: 











Constitutional: Yes: No Distress, Thin


Eyes: Yes: Conjunctiva Clear, EOM Intact


HENT: Yes: Atraumatic, Normocephalic


Neck: Yes: Supple, Trachea Midline


Cardiovascular: Yes: Regular Rate and Rhythm


Respiratory: Yes: Diminished, Rhonchi.  No: Accessory Muscle Use, Rales, SOB, 

SOB on Exertion, Stridor, Tachypnea, Wheezes


...Inspection: Yes: WNL


...Clubbing: No


Gastrointestinal: Yes: Normal Bowel Sounds, Soft, Tenderness, Rebound


Musculoskeletal: Yes: WNL, Joint Stiffness, Joint Swelling, Muscle Pain


Extremities: Yes: WNL


Edema: No


Peripheral Pulses WNL: Yes


Integumentary: Yes: WNL


Neurological: Yes: Alert


...Motor Strength: WNL


Psychiatric: Yes: Alert


Labs: 


                        Laboratory Results - last 24 hr











  06/24/20 06/24/20 06/24/20





  10:59 15:27 17:03


 


WBC   


 


RBC   


 


Hgb   


 


Hct   


 


MCV   


 


MCH   


 


MCHC   


 


RDW   


 


Plt Count   


 


MPV   


 


Absolute Neuts (auto)   


 


Neutrophils %   


 


Lymphocytes %   


 


Monocytes %   


 


Eosinophils %   


 


Basophils %   


 


Nucleated RBC %   


 


Sodium   


 


Potassium   


 


Chloride   


 


Carbon Dioxide   


 


Anion Gap   


 


BUN   


 


Creatinine   


 


Est GFR (CKD-EPI)AfAm   


 


Est GFR (CKD-EPI)NonAf   


 


POC Glucometer   206  49


 


Random Glucose   


 


Calcium   


 


Phosphorus   


 


Magnesium   


 


Total Bilirubin   


 


AST   


 


ALT   


 


Alkaline Phosphatase   


 


Total Protein   


 


Albumin   


 


COVID-19 (DOTTIE)  Not detected  














  06/24/20 06/25/20 06/25/20





  21:59 06:06 06:26


 


WBC    7.7


 


RBC    3.94 L


 


Hgb    12.0


 


Hct    36.7


 


MCV    93.2


 


MCH    30.4


 


MCHC    32.6


 


RDW    12.8


 


Plt Count    226


 


MPV    9.1


 


Absolute Neuts (auto)    5.3


 


Neutrophils %    69.1


 


Lymphocytes %    16.8  D


 


Monocytes %    8.9


 


Eosinophils %    4.5


 


Basophils %    0.7


 


Nucleated RBC %    0


 


Sodium   


 


Potassium   


 


Chloride   


 


Carbon Dioxide   


 


Anion Gap   


 


BUN   


 


Creatinine   


 


Est GFR (CKD-EPI)AfAm   


 


Est GFR (CKD-EPI)NonAf   


 


POC Glucometer  146  200 


 


Random Glucose   


 


Calcium   


 


Phosphorus   


 


Magnesium   


 


Total Bilirubin   


 


AST   


 


ALT   


 


Alkaline Phosphatase   


 


Total Protein   


 


Albumin   


 


COVID-19 (DOTTIE)   














  06/25/20 06/25/20





  06:26 10:49


 


WBC  


 


RBC  


 


Hgb  


 


Hct  


 


MCV  


 


MCH  


 


MCHC  


 


RDW  


 


Plt Count  


 


MPV  


 


Absolute Neuts (auto)  


 


Neutrophils %  


 


Lymphocytes %  


 


Monocytes %  


 


Eosinophils %  


 


Basophils %  


 


Nucleated RBC %  


 


Sodium  134 L 


 


Potassium  4.2 


 


Chloride  104 


 


Carbon Dioxide  23 


 


Anion Gap  7 L 


 


BUN  14.9 


 


Creatinine  0.5 L 


 


Est GFR (CKD-EPI)AfAm  127.25 


 


Est GFR (CKD-EPI)NonAf  109.79 


 


POC Glucometer   166


 


Random Glucose  220 H 


 


Calcium  8.3 L 


 


Phosphorus  2.9 


 


Magnesium  2.0 


 


Total Bilirubin  0.4 


 


AST  13 L 


 


ALT  11 L 


 


Alkaline Phosphatase  87 


 


Total Protein  6.7 


 


Albumin  2.8 L 


 


COVID-19 (DOTTIE)  











Imaging





- Results


Chest X-ray: Report Reviewed, Image Reviewed


Cat Scan: Report Reviewed, Image Reviewed





Problem List





- Problems


(1) COPD (chronic obstructive pulmonary disease)


Code(s): J44.9 - CHRONIC OBSTRUCTIVE PULMONARY DISEASE, UNSPECIFIED   





(2) Elevated ETOH level


Code(s): R78.0 - FINDING OF ALCOHOL IN BLOOD   


Qualifiers: 


   Blood alcohol level: level not specified   Qualified Code(s): R78.0 - Finding

of alcohol in blood   





(3) Shoulder fracture, right


Code(s): S42.91XA - FRACTURE OF RIGHT SHOULDER GIRDLE, PART UNSP, INIT   


Qualifiers: 


   Encounter type: subsequent encounter   Fracture type: closed   Fracture 

healing: with malunion   Qualified Code(s): S42.91XP - Fracture of right 

shoulder girdle, part unspecified, subsequent encounter for fracture with 

malunion   





(4) Alcohol abuse


Code(s): F10.10 - ALCOHOL ABUSE, UNCOMPLICATED   





(5) Right upper lobe consolidation


Code(s): J18.1 - LOBAR PNEUMONIA, UNSPECIFIED ORGANISM   





(6) Severe protein-calorie malnutrition


Code(s): E43 - UNSPECIFIED SEVERE PROTEIN-CALORIE MALNUTRITION   





(7) Shortness of breath


Code(s): R06.02 - SHORTNESS OF BREATH   





(8) Smoker


Code(s): F17.200 - NICOTINE DEPENDENCE, UNSPECIFIED, UNCOMPLICATED   





Assessment/Plan


No ABX at this time 


No smoking counseled 


No indication for systemic steroids 


BD TX PRN 


Will need to follow RLL opacity (Can have PET imaging if compliant)


There was a previous concern for P TB in 2019.  At present no active symptoms 

but he should follow with [facility] as some work up was performed in 2019. 


DC planning 





Dr Phipps 











Problem List





- Problems


(1) COPD (chronic obstructive pulmonary disease)


Code(s): J44.9 - CHRONIC OBSTRUCTIVE PULMONARY DISEASE, UNSPECIFIED   





(2) Elevated ETOH level


Code(s): R78.0 - FINDING OF ALCOHOL IN BLOOD   


Qualifiers: 


   Blood alcohol level: level not specified   Qualified Code(s): R78.0 - Finding

of alcohol in blood   





(3) Shoulder fracture, right


Code(s): S42.91XA - FRACTURE OF RIGHT SHOULDER GIRDLE, PART UNSP, INIT   


Qualifiers: 


   Encounter type: subsequent encounter   Fracture type: closed   Fracture 

healing: with malunion   Qualified Code(s): S42.91XP - Fracture of right 

shoulder girdle, part unspecified, subsequent encounter for fracture with 

malunion   





(4) Alcohol abuse


Code(s): F10.10 - ALCOHOL ABUSE, UNCOMPLICATED   





(5) Right upper lobe consolidation


Code(s): J18.1 - LOBAR PNEUMONIA, UNSPECIFIED ORGANISM   





(6) Severe protein-calorie malnutrition


Code(s): E43 - UNSPECIFIED SEVERE PROTEIN-CALORIE MALNUTRITION   





(7) Shortness of breath


Code(s): R06.02 - SHORTNESS OF BREATH   





(8) Smoker


Code(s): F17.200 - NICOTINE DEPENDENCE, UNSPECIFIED, UNCOMPLICATED

## 2020-06-25 NOTE — PN
Teaching Attending Note


Name of Resident: Arlene Parisi





ATTENDING PHYSICIAN STATEMENT





I saw and evaluated the patient.


I reviewed the resident's note and discussed the case with the resident.


I agree with the resident's findings and plan as documented.








SUBJECTIVE:


pain in shouldfer , back , legs and every body part. No SOB . No cp 





:





OBJECTIVE:


NAD, awake, alert, oriented x 3 .cooperative . No facial droop. dry MM


CV: RRR


Lungs: CTAB 


Abd: sfot, NT, Nd , NL  BS 


Ext: no edema or erythema on upper or lower extremities.  R upper humer with 

slight deformity and tenderness to palpation . no erythema.. 








ASSESSMENT AND PLAN:





68 y/o man with h/o COPD , CVA , EtOH use disorder, DM, Right proximal Humerus 

Fx in 3/20 who presented with alcohol intoxication 





1- ETOH intoxication


- dc IVf 


- cont po folate and thiamine 


- cont librium 





2- Old R humeral hehad Fx with non reunion. seen by ortho . No surgical p

rocedure is indicated .





 3- H/o DM. he takes no meds at home . 


- use SSi here 


- can dc on po meds when ready 





4- mediastinal air seen on CT scan. no intervention 





5- Mdiastinal lymphadenopathy, and pulm opacities, f/u as out pt .with 2 month 

CT scan 


f/u with [facility] 





6- T3 lesion in vertebral body. CT in 2 months . 





[facility] if [facility]

## 2020-06-25 NOTE — PN
Physical Exam: 


SUBJECTIVE: Patient seen and examined. Pt reports initially having back pain but

has resolved. He reports only having had 1 drink to help with his cold symptoms.

He denies having a drinking problem and refuses to go to [facility] for detox. Pt

has positional right arm pain.








OBJECTIVE:





                                   Vital Signs











 Period  Temp  Pulse  Resp  BP Sys/Michael  Pulse Ox


 


 Last 24 Hr  97.6 F-98.8 F  66-85  16-20  105-122/51-62  96-96











GENERAL: The patient is awake, alert, and fully oriented, in no acute distress. 

Thin.


HEAD: Normal with no signs of trauma.


EYES: PERRL, extraocular movements intact, sclera anicteric, conjunctiva clear. 


ENT: Ears normal, nares patent, moist mucous membranes.


NECK: Trachea midline.


LUNGS: Clear to auscultation bilaterally, no wheezes.


HEART: Regular rate and rhythm, S1, S2 without murmur.


ABDOMEN: Soft, nontender, nondistended, normoactive bowel sounds.


EXTREMITIES: Warm, well-perfused, no edema. 


NEUROLOGICAL: Cranial nerves II through XII grossly intact. Slow speech.


PSYCH: Labile mood.


SKIN: Warm, dry, normal turgor.














                         Laboratory Results - last 24 hr











  06/24/20 06/24/20 06/25/20





  10:59 21:59 06:06


 


WBC   


 


RBC   


 


Hgb   


 


Hct   


 


MCV   


 


MCH   


 


MCHC   


 


RDW   


 


Plt Count   


 


MPV   


 


Absolute Neuts (auto)   


 


Neutrophils %   


 


Lymphocytes %   


 


Monocytes %   


 


Eosinophils %   


 


Basophils %   


 


Nucleated RBC %   


 


Sodium   


 


Potassium   


 


Chloride   


 


Carbon Dioxide   


 


Anion Gap   


 


BUN   


 


Creatinine   


 


Est GFR (CKD-EPI)AfAm   


 


Est GFR (CKD-EPI)NonAf   


 


POC Glucometer   146  200


 


Random Glucose   


 


Calcium   


 


Phosphorus   


 


Magnesium   


 


Total Bilirubin   


 


AST   


 


ALT   


 


Alkaline Phosphatase   


 


Total Protein   


 


Albumin   


 


COVID-19 (DOTTIE)  Not detected  














  06/25/20 06/25/20 06/25/20





  06:26 06:26 10:49


 


WBC  7.7  


 


RBC  3.94 L  


 


Hgb  12.0  


 


Hct  36.7  


 


MCV  93.2  


 


MCH  30.4  


 


MCHC  32.6  


 


RDW  12.8  


 


Plt Count  226  


 


MPV  9.1  


 


Absolute Neuts (auto)  5.3  


 


Neutrophils %  69.1  


 


Lymphocytes %  16.8  D  


 


Monocytes %  8.9  


 


Eosinophils %  4.5  


 


Basophils %  0.7  


 


Nucleated RBC %  0  


 


Sodium   134 L 


 


Potassium   4.2 


 


Chloride   104 


 


Carbon Dioxide   23 


 


Anion Gap   7 L 


 


BUN   14.9 


 


Creatinine   0.5 L 


 


Est GFR (CKD-EPI)AfAm   127.25 


 


Est GFR (CKD-EPI)NonAf   109.79 


 


POC Glucometer    166


 


Random Glucose   220 H 


 


Calcium   8.3 L 


 


Phosphorus   2.9 


 


Magnesium   2.0 


 


Total Bilirubin   0.4 


 


AST   13 L 


 


ALT   11 L 


 


Alkaline Phosphatase   87 


 


Total Protein   6.7 


 


Albumin   2.8 L 


 


COVID-19 (DOTTIE)   














  06/25/20





  16:42


 


WBC 


 


RBC 


 


Hgb 


 


Hct 


 


MCV 


 


MCH 


 


MCHC 


 


RDW 


 


Plt Count 


 


MPV 


 


Absolute Neuts (auto) 


 


Neutrophils % 


 


Lymphocytes % 


 


Monocytes % 


 


Eosinophils % 


 


Basophils % 


 


Nucleated RBC % 


 


Sodium 


 


Potassium 


 


Chloride 


 


Carbon Dioxide 


 


Anion Gap 


 


BUN 


 


Creatinine 


 


Est GFR (CKD-EPI)AfAm 


 


Est GFR (CKD-EPI)NonAf 


 


POC Glucometer  188


 


Random Glucose 


 


Calcium 


 


Phosphorus 


 


Magnesium 


 


Total Bilirubin 


 


AST 


 


ALT 


 


Alkaline Phosphatase 


 


Total Protein 


 


Albumin 


 


COVID-19 (DOTTIE) 








Active Medications











Generic Name Dose Route Start Last Admin





  Trade Name Freq  PRN Reason Stop Dose Admin


 


Acetaminophen  650 mg  06/24/20 00:46  06/25/20 12:58





  Tylenol -  PO   650 mg





  Q6H PRN   Administration





  Fever Or Pain  


 


Albuterol/Ipratropium  1 amp  06/24/20 00:47 





  Duoneb -  NEB  





  Q6H PRN  





  SHORTNESS OF BREATH  


 


Aspirin  81 mg  06/25/20 10:00  06/25/20 09:18





  Ecotrin -  PO   81 mg





  DAILY ROLY   Administration


 


Budesonide/Formoterol Fumarate  2 puff  06/24/20 22:00  06/25/20 09:21





  Symbicort 160/4.5mcg -  IH   2 puff





  BID ROLY   Administration


 


Chlordiazepoxide HCl  25 mg  06/24/20 13:00  06/25/20 12:40





  Librium -  PO  06/25/20 21:01  25 mg





  Q8H ROLY   Administration


 


Chlordiazepoxide HCl  10 mg  06/27/20 00:00 





  Librium -  PO  06/27/20 23:59 





  Q12H PRN  





  Signs/symptoms of Withdrawal  


 


Chlordiazepoxide HCl  10 mg  06/24/20 13:16 





  Librium -  PO  06/26/20 23:59 





  Q8H PRN  





  Signs/symptoms of Withdrawal  


 


Chlordiazepoxide HCl  15 mg  06/26/20 05:00 





  Librium -  PO  06/26/20 21:01 





  Q8H ROLY  


 


Chlordiazepoxide HCl  10 mg  06/27/20 05:00 





  Librium -  PO  06/27/20 21:01 





  Q8H ROLY  


 


Chlordiazepoxide HCl  10 mg  06/28/20 05:00 





  Librium -  PO  06/28/20 05:01 





  ONCE ONE  


 


Enoxaparin Sodium  40 mg  06/24/20 10:00  06/25/20 09:17





  Lovenox -  SQ   40 mg





  DAILY ROLY   Administration


 


Folic Acid  1 mg  06/24/20 10:00  06/25/20 09:17





  Folic Acid -  PO   1 mg





  DAILY ROLY   Administration


 


Insulin Aspart  1 vial  06/24/20 07:00  06/25/20 16:43





  Novolog Vial Sliding Scale -  SQ   2 units





  ACHS ROLY   Administration





  Protocol  


 


Multivitamins/Minerals/Vitamin C  1 tab  06/24/20 10:00  06/25/20 09:18





  Tab-A-Vit -  PO   1 tab





  DAILY ROLY   Administration


 


Nicotine  14 mg  06/24/20 10:00  06/25/20 09:18





  Nicoderm Patch -  TD   14 mg





  DAILY ROLY   Administration


 


Thiamine HCl  100 mg  06/24/20 10:00  06/25/20 09:18





  Vitamin B1 -  PO   100 mg





  DAILY ROLY   Administration











ASSESSMENT/PLAN:


Pt is a 70y/o male with ETOH use disorder, tobacco use disorder, COPD, and NIDDM

who presents following drinking and laying down on the ground while waiting for 

the bus. He was brought in by EMS.





#alcohol use disorder


-ETOH 119 in ED, combative at presentation


-CIWA 0, symptoms well-controlled


-Librium taper for detox day 2


-thiamine


-folate


-MV





#failure to thrive


-BMI 17


-likely 2/2 ETOH use


-encourage eating


-vitamin supplements


-dietician consult





#right shoulder pain


-humeral fx in March


-ortho consult- nothing to do, f/u outpatient as needed





#COPD


-continue Symbicort BID


-duonebs PRN


-pulm consult- chronic changes in b/l apices R > L possibly from chronic 

aspiration, RLL 1.5cm x 1.5cm non-specific opacity, minimal right clavicular air


-will need repeat CT as outpatient





#tobacco use disorder


-nicotine patch


-tobacco cessation education





#NIDDM


-A1C 7.8


-hold home Janumet


-SSI


-BGMs





DVT Ppx


Lovenox





FEN


d/c fluids as long as pt is tolerating PO, IV keeps coming out


monitor labs


diabetic diet





dispo


med/surg





FULL CODE








Visit type





- Emergency Visit


Emergency Visit: Yes


ED Registration Date: 06/23/20


Care time: The patient presented to the Emergency Department on the above date 

and was hospitalized for further evaluation of their emergent condition.





- New Patient


This patient is new to me today: No





- Critical Care


Critical Care patient: No





ATTENDING PHYSICIAN STATEMENT





I saw and evaluated the patient.


I reviewed the resident's note and discussed the case with the resident.


I agree with the resident's findings and plan as documented.








SUBJECTIVE:








OBJECTIVE:








ASSESSMENT AND PLAN:

## 2020-06-26 LAB
ANION GAP SERPL CALC-SCNC: 4 MMOL/L (ref 8–16)
BUN SERPL-MCNC: 19.6 MG/DL (ref 7–18)
CALCIUM SERPL-MCNC: 8.5 MG/DL (ref 8.5–10.1)
CHLORIDE SERPL-SCNC: 104 MMOL/L (ref 98–107)
CO2 SERPL-SCNC: 28 MMOL/L (ref 21–32)
CREAT SERPL-MCNC: 0.6 MG/DL (ref 0.55–1.3)
GLUCOSE SERPL-MCNC: 129 MG/DL (ref 74–106)
MAGNESIUM SERPL-MCNC: 2.2 MG/DL (ref 1.8–2.4)
PHOSPHATE SERPL-MCNC: 3.4 MG/DL (ref 2.5–4.9)
POTASSIUM SERPLBLD-SCNC: 4.4 MMOL/L (ref 3.5–5.1)
SODIUM SERPL-SCNC: 136 MMOL/L (ref 136–145)

## 2020-06-26 RX ADMIN — ACETAMINOPHEN PRN MG: 325 TABLET ORAL at 14:30

## 2020-06-26 RX ADMIN — INSULIN ASPART SCH UNITS: 100 INJECTION, SOLUTION INTRAVENOUS; SUBCUTANEOUS at 11:56

## 2020-06-26 RX ADMIN — ENOXAPARIN SODIUM SCH MG: 40 INJECTION SUBCUTANEOUS at 11:04

## 2020-06-26 RX ADMIN — BUDESONIDE AND FORMOTEROL FUMARATE DIHYDRATE SCH: 160; 4.5 AEROSOL RESPIRATORY (INHALATION) at 14:33

## 2020-06-26 RX ADMIN — MULTIVITAMIN TABLET SCH TAB: TABLET at 11:04

## 2020-06-26 RX ADMIN — ASPIRIN SCH MG: 81 TABLET, COATED ORAL at 11:04

## 2020-06-26 RX ADMIN — FOLIC ACID SCH MG: 1 TABLET ORAL at 11:04

## 2020-06-26 RX ADMIN — BUDESONIDE AND FORMOTEROL FUMARATE DIHYDRATE SCH: 160; 4.5 AEROSOL RESPIRATORY (INHALATION) at 21:33

## 2020-06-26 RX ADMIN — INSULIN ASPART SCH UNITS: 100 INJECTION, SOLUTION INTRAVENOUS; SUBCUTANEOUS at 21:29

## 2020-06-26 RX ADMIN — INSULIN ASPART SCH UNITS: 100 INJECTION, SOLUTION INTRAVENOUS; SUBCUTANEOUS at 06:02

## 2020-06-26 RX ADMIN — Medication SCH MG: at 11:04

## 2020-06-26 RX ADMIN — INSULIN ASPART SCH UNITS: 100 INJECTION, SOLUTION INTRAVENOUS; SUBCUTANEOUS at 17:25

## 2020-06-26 RX ADMIN — NICOTINE SCH MG: 14 PATCH, EXTENDED RELEASE TRANSDERMAL at 11:05

## 2020-06-26 NOTE — PN
Progress Note (short form)





- Note


Progress Note: 





PULMONARY





AMBULATING IN ROOM


NOT WEARING O2


WANTS TO GO HOME


OFFERS NO COMPLAINTS








VSS/AFEBRILE


Constitutional: Yes: No Distress, Thin


Eyes: Yes: Conjunctiva Clear, EOM Intact


HENT: Yes: Atraumatic, Normocephalic


Neck: Yes: Supple, Trachea Midline


Cardiovascular: Yes: Regular Rate and Rhythm


Respiratory: Yes: Diminished, Rhonchi.  No: Accessory Muscle Use, Rales, SOB, 

SOB on Exertion, Stridor, Tachypnea, Wheezes


...Inspection: Yes: WNL


...Clubbing: No


Gastrointestinal: Yes: Normal Bowel Sounds, Soft, Tenderness, Rebound


Musculoskeletal: Yes: WNL, Joint Stiffness, Joint Swelling, Muscle Pain


Extremities: Yes: WNL


Edema: No


Peripheral Pulses WNL: Yes


Integumentary: Yes: WNL


Neurological: Yes: Alert


...Motor Strength: WNL


Psychiatric: Yes: Alert


Labs: NOTED





Imaging





- Results


Chest X-ray: Report Reviewed, Image Reviewed


Cat Scan: Report Reviewed, Image Reviewed





Problem List





- Problems


(1) COPD (chronic obstructive pulmonary disease)


Code(s): J44.9 - CHRONIC OBSTRUCTIVE PULMONARY DISEASE, UNSPECIFIED   





(2) Elevated ETOH level


Code(s): R78.0 - FINDING OF ALCOHOL IN BLOOD   


Qualifiers: 


   Blood alcohol level: level not specified   Qualified Code(s): R78.0 - Finding

of alcohol in blood   





(3) Shoulder fracture, right


Code(s): S42.91XA - FRACTURE OF RIGHT SHOULDER GIRDLE, PART UNSP, INIT   


Qualifiers: 


   Encounter type: subsequent encounter   Fracture type: closed   Fracture 

healing: with malunion   Qualified Code(s): S42.91XP - Fracture of right 

shoulder girdle, part unspecified, subsequent encounter for fracture with 

malunion   





(4) Alcohol abuse


Code(s): F10.10 - ALCOHOL ABUSE, UNCOMPLICATED   





(5) Right upper lobe consolidation


Code(s): J18.1 - LOBAR PNEUMONIA, UNSPECIFIED ORGANISM   





(6) Severe protein-calorie malnutrition


Code(s): E43 - UNSPECIFIED SEVERE PROTEIN-CALORIE MALNUTRITION   





(7) Shortness of breath


Code(s): R06.02 - SHORTNESS OF BREATH   





(8) Smoker


Code(s): F17.200 - NICOTINE DEPENDENCE, UNSPECIFIED, UNCOMPLICATED   





Assessment/Plan





No smoking counseled 


No indication for systemic steroids 


BD TX PRN 


Will need to follow RLL opacity (Can have PET imaging if compliant)


There was a previous concern for P TB in 2019.  At present no active symptoms 

but he should follow with [facility] as some work up was performed in 2019. 


LUDA planning 


F/U AS OUTPATIENT 





MJ ISLAS MD

## 2020-06-26 NOTE — PN
Physical Exam: 


SUBJECTIVE: Patient seen and examined. Pt has no complaints today.








OBJECTIVE:





                                   Vital Signs











 Period  Temp  Pulse  Resp  BP Sys/Michael  Pulse Ox


 


 Last 24 Hr  97.3 F-98.8 F  75-86  16-19  /57-69  96











GENERAL: The patient is awake, alert, and fully oriented, in no acute distress. 

Thin.


HEAD: Normal with no signs of trauma.


EYES: PERRL, extraocular movements intact, sclera anicteric, conjunctiva clear. 


ENT: Ears normal, nares patent, moist mucous membranes.


NECK: Trachea midline.


LUNGS: Clear to auscultation bilaterally, no wheezes.


HEART: Regular rate and rhythm, S1, S2 without murmur.


ABDOMEN: Soft, nontender, nondistended, normoactive bowel sounds.


EXTREMITIES: Warm, well-perfused, no edema. 


NEUROLOGICAL: Cranial nerves II through XII grossly intact. Slow speech.


PSYCH: Labile mood.


SKIN: Warm, dry, normal turgor.














                         Laboratory Results - last 24 hr











  06/25/20 06/25/20 06/26/20





  16:42 21:57 05:58


 


Sodium   


 


Potassium   


 


Chloride   


 


Carbon Dioxide   


 


Anion Gap   


 


BUN   


 


Creatinine   


 


Est GFR (CKD-EPI)AfAm   


 


Est GFR (CKD-EPI)NonAf   


 


POC Glucometer  188  156  185


 


Random Glucose   


 


Calcium   


 


Phosphorus   


 


Magnesium   














  06/26/20 06/26/20





  07:15 11:24


 


Sodium  136 


 


Potassium  4.4 


 


Chloride  104 


 


Carbon Dioxide  28 


 


Anion Gap  4 L 


 


BUN  19.6 H 


 


Creatinine  0.6 


 


Est GFR (CKD-EPI)AfAm  118.07 


 


Est GFR (CKD-EPI)NonAf  101.87 


 


POC Glucometer   253


 


Random Glucose  129 H 


 


Calcium  8.5 


 


Phosphorus  3.4 


 


Magnesium  2.2 








Active Medications











Generic Name Dose Route Start Last Admin





  Trade Name Freq  PRN Reason Stop Dose Admin


 


Acetaminophen  650 mg  06/24/20 00:46  06/26/20 14:30





  Tylenol -  PO   650 mg





  Q6H PRN   Administration





  Fever Or Pain  


 


Albuterol/Ipratropium  1 amp  06/24/20 00:47 





  Duoneb -  NEB  





  Q6H PRN  





  SHORTNESS OF BREATH  


 


Aspirin  81 mg  06/25/20 10:00  06/26/20 11:04





  Ecotrin -  PO   81 mg





  DAILY ROLY   Administration


 


Budesonide/Formoterol Fumarate  2 puff  06/24/20 22:00  06/26/20 14:33





  Symbicort 160/4.5mcg -  IH   Not Given





  BID ROLY  


 


Chlordiazepoxide HCl  10 mg  06/27/20 00:00 





  Librium -  PO  06/27/20 23:59 





  Q12H PRN  





  Signs/symptoms of Withdrawal  


 


Chlordiazepoxide HCl  10 mg  06/24/20 13:16 





  Librium -  PO  06/26/20 23:59 





  Q8H PRN  





  Signs/symptoms of Withdrawal  


 


Chlordiazepoxide HCl  15 mg  06/26/20 05:00  06/26/20 14:32





  Librium -  PO  06/26/20 21:01  15 mg





  Q8H ROLY   Administration


 


Chlordiazepoxide HCl  10 mg  06/27/20 05:00 





  Librium -  PO  06/27/20 21:01 





  Q8H ROLY  


 


Chlordiazepoxide HCl  10 mg  06/28/20 05:00 





  Librium -  PO  06/28/20 05:01 





  ONCE ONE  


 


Enoxaparin Sodium  40 mg  06/24/20 10:00  06/26/20 11:04





  Lovenox -  SQ   40 mg





  DAILY ROLY   Administration


 


Folic Acid  1 mg  06/24/20 10:00  06/26/20 11:04





  Folic Acid -  PO   1 mg





  DAILY ROLY   Administration


 


Insulin Aspart  1 vial  06/24/20 07:00  06/26/20 11:56





  Novolog Vial Sliding Scale -  SQ   6 units





  ACHS ROLY   Administration





  Protocol  


 


Multivitamins/Minerals/Vitamin C  1 tab  06/24/20 10:00  06/26/20 11:04





  Tab-A-Vit -  PO   1 tab





  DAILY ROLY   Administration


 


Nicotine  14 mg  06/24/20 10:00  06/26/20 11:05





  Nicoderm Patch -  TD   14 mg





  DAILY ROLY   Administration


 


Thiamine HCl  100 mg  06/24/20 10:00  06/26/20 11:04





  Vitamin B1 -  PO   100 mg





  DAILY ROLY   Administration











ASSESSMENT/PLAN:


Pt is a 68y/o male with ETOH use disorder, tobacco use disorder, COPD, and NIDDM

who presents following drinking and laying down on the ground while waiting for 

the bus. He was brought in by EMS.





#alcohol use disorder


-ETOH 119 in ED, combative at presentation


-CIWA 0, symptoms well-controlled


-Librium taper for detox day 3


-pt denies being alcoholic and is refusing to go to [facility], will complete tx 

here


-thiamine


-folate


-MV





#failure to thrive


-BMI 17


-likely 2/2 ETOH use


-encourage eating


-vitamin supplements


-dietician consult





#right shoulder pain


-humeral fx in March


-ortho consult- nothing to do, f/u outpatient as needed





#COPD


-continue Symbicort BID


-duonebs PRN


-pulm consult- chronic changes in b/l apices R > L possibly from chronic 

aspiration, RLL 1.5cm x 1.5cm non-specific opacity, minimal right clavicular air


-will need repeat CT/PET as outpatient





#tobacco use disorder


-nicotine patch


-tobacco cessation education





#NIDDM


-A1C 7.8


-hold home Janumet


-SSI


-BGMs





DVT Ppx


Lovenox





FEN


d/c fluids as long as pt is tolerating PO, IV keeps coming out


monitor labs


diabetic diet





dispo


med/surg





FULL CODE











Visit type





- Emergency Visit


Emergency Visit: Yes


ED Registration Date: 06/23/20


Care time: The patient presented to the Emergency Department on the above date 

and was hospitalized for further evaluation of their emergent condition.





- New Patient


This patient is new to me today: No





- Critical Care


Critical Care patient: No





ATTENDING PHYSICIAN STATEMENT





I saw and evaluated the patient.


I reviewed the resident's note and discussed the case with the resident.


I agree with the resident's findings and plan as documented.








SUBJECTIVE:








OBJECTIVE:








ASSESSMENT AND PLAN:

## 2020-06-26 NOTE — PN
Teaching Attending Note


Name of Resident: Arlene Parisi





ATTENDING PHYSICIAN STATEMENT





I saw and evaluated the patient.


I reviewed the resident's note and discussed the case with the resident.


I agree with the resident's findings and plan as documented.








SUBJECTIVE: Patient has no complaints.








OBJECTIVE:


                                   Vital Signs











 Period  Temp  Pulse  Resp  BP Sys/Michael  Pulse Ox


 


 Last 24 Hr  97.3 F-98.6 F  75-86  16-19  /57-69  96








HEART: S1S2, RRR


LUNGS: Clear


ABDOMEN: Soft, non-tender, non-distended, normal BS


EXTREMITIES: No edema








                         Laboratory Results - last 24 hr











  06/25/20 06/26/20 06/26/20





  21:57 05:58 07:15


 


Sodium    136


 


Potassium    4.4


 


Chloride    104


 


Carbon Dioxide    28


 


Anion Gap    4 L


 


BUN    19.6 H


 


Creatinine    0.6


 


Est GFR (CKD-EPI)AfAm    118.07


 


Est GFR (CKD-EPI)NonAf    101.87


 


POC Glucometer  156  185 


 


Random Glucose    129 H


 


Calcium    8.5


 


Phosphorus    3.4


 


Magnesium    2.2














  06/26/20 06/26/20





  11:24 17:19


 


Sodium  


 


Potassium  


 


Chloride  


 


Carbon Dioxide  


 


Anion Gap  


 


BUN  


 


Creatinine  


 


Est GFR (CKD-EPI)AfAm  


 


Est GFR (CKD-EPI)NonAf  


 


POC Glucometer  253  173


 


Random Glucose  


 


Calcium  


 


Phosphorus  


 


Magnesium  








                               Current Medications











Generic Name Dose Route Start Last Admin





  Trade Name Freq  PRN Reason Stop Dose Admin


 


Acetaminophen  650 mg  06/24/20 00:46  06/26/20 14:30





  Tylenol -  PO   650 mg





  Q6H PRN   Administration





  Fever Or Pain  


 


Albuterol/Ipratropium  1 amp  06/24/20 00:47 





  Duoneb -  NEB  





  Q6H PRN  





  SHORTNESS OF BREATH  


 


Aspirin  81 mg  06/25/20 10:00  06/26/20 11:04





  Ecotrin -  PO   81 mg





  DAILY ROLY   Administration


 


Budesonide/Formoterol Fumarate  2 puff  06/24/20 22:00  06/26/20 14:33





  Symbicort 160/4.5mcg -  IH   Not Given





  BID ROLY  


 


Chlordiazepoxide HCl  10 mg  06/27/20 00:00 





  Librium -  PO  06/27/20 23:59 





  Q12H PRN  





  Signs/symptoms of Withdrawal  


 


Chlordiazepoxide HCl  10 mg  06/24/20 13:16 





  Librium -  PO  06/26/20 23:59 





  Q8H PRN  





  Signs/symptoms of Withdrawal  


 


Chlordiazepoxide HCl  15 mg  06/26/20 05:00  06/26/20 14:32





  Librium -  PO  06/26/20 21:01  15 mg





  Q8H ROLY   Administration


 


Chlordiazepoxide HCl  10 mg  06/27/20 05:00 





  Librium -  PO  06/27/20 21:01 





  Q8H ROLY  


 


Chlordiazepoxide HCl  10 mg  06/28/20 05:00 





  Librium -  PO  06/28/20 05:01 





  ONCE ONE  


 


Enoxaparin Sodium  40 mg  06/24/20 10:00  06/26/20 11:04





  Lovenox -  SQ   40 mg





  DAILY ROLY   Administration


 


Folic Acid  1 mg  06/24/20 10:00  06/26/20 11:04





  Folic Acid -  PO   1 mg





  DAILY ROLY   Administration


 


Insulin Aspart  1 vial  06/24/20 07:00  06/26/20 17:25





  Novolog Vial Sliding Scale -  SQ   2 units





  ACHS ROLY   Administration





  Protocol  


 


Multivitamins/Minerals/Vitamin C  1 tab  06/24/20 10:00  06/26/20 11:04





  Tab-A-Vit -  PO   1 tab





  DAILY ROLY   Administration


 


Nicotine  14 mg  06/24/20 10:00  06/26/20 11:05





  Nicoderm Patch -  TD   14 mg





  DAILY ROLY   Administration


 


Thiamine HCl  100 mg  06/24/20 10:00  06/26/20 11:04





  Vitamin B1 -  PO   100 mg





  DAILY ROLY   Administration














ASSESSMENT AND PLAN:


This is a 69 year old man with a history of COPD, CVA, type 2 DM, alcohol abuse 

who presented to the ED after falling.





1. Alcohol dependence, continuous


   - Continue Librium detox


   - Continue thiamine, folic acid, multivitamin


   - Refusing detox/rehab at [facility]

## 2020-06-27 VITALS — DIASTOLIC BLOOD PRESSURE: 60 MMHG | TEMPERATURE: 98.6 F | SYSTOLIC BLOOD PRESSURE: 105 MMHG | HEART RATE: 109 BPM

## 2020-06-27 LAB
ALBUMIN SERPL-MCNC: 3.1 G/DL (ref 3.4–5)
ALP SERPL-CCNC: 90 U/L (ref 45–117)
ALT SERPL-CCNC: 17 U/L (ref 13–61)
ANION GAP SERPL CALC-SCNC: 7 MMOL/L (ref 8–16)
AST SERPL-CCNC: 22 U/L (ref 15–37)
BASOPHILS # BLD: 0.8 % (ref 0–2)
BILIRUB SERPL-MCNC: 0.9 MG/DL (ref 0.2–1)
BUN SERPL-MCNC: 18.4 MG/DL (ref 7–18)
CALCIUM SERPL-MCNC: 7.9 MG/DL (ref 8.5–10.1)
CHLORIDE SERPL-SCNC: 97 MMOL/L (ref 98–107)
CO2 SERPL-SCNC: 27 MMOL/L (ref 21–32)
CREAT SERPL-MCNC: 0.7 MG/DL (ref 0.55–1.3)
DEPRECATED RDW RBC AUTO: 12.7 % (ref 11.9–15.9)
EOSINOPHIL # BLD: 1.5 % (ref 0–4.5)
GLUCOSE SERPL-MCNC: 223 MG/DL (ref 74–106)
HCT VFR BLD CALC: 38.8 % (ref 35.4–49)
HGB BLD-MCNC: 12.5 GM/DL (ref 11.7–16.9)
LYMPHOCYTES # BLD: 8.5 % (ref 8–40)
MAGNESIUM SERPL-MCNC: 2 MG/DL (ref 1.8–2.4)
MCH RBC QN AUTO: 30.7 PG (ref 25.7–33.7)
MCHC RBC AUTO-ENTMCNC: 32.3 G/DL (ref 32–35.9)
MCV RBC: 95.2 FL (ref 80–96)
MONOCYTES # BLD AUTO: 7.2 % (ref 3.8–10.2)
NEUTROPHILS # BLD: 82 % (ref 42.8–82.8)
PHOSPHATE SERPL-MCNC: 3 MG/DL (ref 2.5–4.9)
PLATELET # BLD AUTO: 247 K/MM3 (ref 134–434)
PMV BLD: 9.2 FL (ref 7.5–11.1)
POTASSIUM SERPLBLD-SCNC: 5.2 MMOL/L (ref 3.5–5.1)
PROT SERPL-MCNC: 7.5 G/DL (ref 6.4–8.2)
RBC # BLD AUTO: 4.08 M/MM3 (ref 4–5.6)
SODIUM SERPL-SCNC: 130 MMOL/L (ref 136–145)
WBC # BLD AUTO: 13.5 K/MM3 (ref 4–10)

## 2020-06-27 RX ADMIN — NICOTINE SCH MG: 14 PATCH, EXTENDED RELEASE TRANSDERMAL at 09:48

## 2020-06-27 RX ADMIN — ASPIRIN SCH MG: 81 TABLET, COATED ORAL at 09:48

## 2020-06-27 RX ADMIN — INSULIN ASPART SCH UNITS: 100 INJECTION, SOLUTION INTRAVENOUS; SUBCUTANEOUS at 11:49

## 2020-06-27 RX ADMIN — MULTIVITAMIN TABLET SCH TAB: TABLET at 09:48

## 2020-06-27 RX ADMIN — ENOXAPARIN SODIUM SCH MG: 40 INJECTION SUBCUTANEOUS at 09:49

## 2020-06-27 RX ADMIN — Medication SCH MG: at 09:49

## 2020-06-27 RX ADMIN — FOLIC ACID SCH MG: 1 TABLET ORAL at 09:48

## 2020-06-27 RX ADMIN — NICOTINE SCH: 14 PATCH, EXTENDED RELEASE TRANSDERMAL at 11:50

## 2020-06-27 RX ADMIN — BUDESONIDE AND FORMOTEROL FUMARATE DIHYDRATE SCH: 160; 4.5 AEROSOL RESPIRATORY (INHALATION) at 09:48

## 2020-06-27 RX ADMIN — INSULIN ASPART SCH: 100 INJECTION, SOLUTION INTRAVENOUS; SUBCUTANEOUS at 06:00

## 2020-06-27 NOTE — PN
Progress Note (short form)





- Note


Progress Note: 





PULMONARY





AMBULATING IN ROOM


NOT WEARING O2


WANTS TO GO HOME


OFFERS NO COMPLAINTS


FULLY DRESSED








VSS/AFEBRILE


Constitutional: Yes: No Distress, Thin


Eyes: Yes: Conjunctiva Clear, EOM Intact


HENT: Yes: Atraumatic, Normocephalic


Neck: Yes: Supple, Trachea Midline


Cardiovascular: Yes: Regular Rate and Rhythm


Respiratory: Yes: Diminished, Rhonchi.  No: Accessory Muscle Use, Rales, SOB, 

SOB on Exertion, Stridor, Tachypnea, Wheezes


...Inspection: Yes: WNL


...Clubbing: No


Gastrointestinal: Yes: Normal Bowel Sounds, Soft, Tenderness, Rebound


Musculoskeletal: Yes: WNL, Joint Stiffness, Joint Swelling, Muscle Pain


Extremities: Yes: WNL


Edema: No


Peripheral Pulses WNL: Yes


Integumentary: Yes: WNL


Neurological: Yes: Alert


...Motor Strength: WNL


Psychiatric: Yes: Alert


Labs: NOTED





Imaging





- Results


Chest X-ray: Report Reviewed, Image Reviewed


Cat Scan: Report Reviewed, Image Reviewed





Problem List





- Problems


(1) COPD (chronic obstructive pulmonary disease)


Code(s): J44.9 - CHRONIC OBSTRUCTIVE PULMONARY DISEASE, UNSPECIFIED   





(2) Elevated ETOH level


Code(s): R78.0 - FINDING OF ALCOHOL IN BLOOD   


Qualifiers: 


   Blood alcohol level: level not specified   Qualified Code(s): R78.0 - Finding

of alcohol in blood   





(3) Shoulder fracture, right


Code(s): S42.91XA - FRACTURE OF RIGHT SHOULDER GIRDLE, PART UNSP, INIT   


Qualifiers: 


   Encounter type: subsequent encounter   Fracture type: closed   Fracture 

healing: with malunion   Qualified Code(s): S42.91XP - Fracture of right 

shoulder girdle, part unspecified, subsequent encounter for fracture with 

malunion   





(4) Alcohol abuse


Code(s): F10.10 - ALCOHOL ABUSE, UNCOMPLICATED   





(5) Right upper lobe consolidation


Code(s): J18.1 - LOBAR PNEUMONIA, UNSPECIFIED ORGANISM   





(6) Severe protein-calorie malnutrition


Code(s): E43 - UNSPECIFIED SEVERE PROTEIN-CALORIE MALNUTRITION   





(7) Shortness of breath


Code(s): R06.02 - SHORTNESS OF BREATH   





(8) Smoker


Code(s): F17.200 - NICOTINE DEPENDENCE, UNSPECIFIED, UNCOMPLICATED   





Assessment/Plan





No smoking counseled 


No indication for systemic steroids 


BD TX PRN 


Will need to follow RLL opacity (Can have PET imaging if compliant)


There was a previous concern for P TB in 2019.  At present no active symptoms 

but he should follow with [facility] as some work up was performed in 2019. 


LUDA planning 


F/U AS OUTPATIENT 





MJ ISLAS MD

## 2020-06-27 NOTE — DS
Physical Exam: 


SUBJECTIVE: Patient seen and examined at bedside. Resting in bed, no acute 

distress. No acute events overnight. 








OBJECTIVE:





                                   Vital Signs











 Period  Temp  Pulse  Resp  BP Sys/Michael  Pulse Ox


 


 Last 24 Hr  97.5 F-99 F    18-20  102-119/53-62  97-98








PHYSICAL EXAM





GENERAL: NAD


HEAD: AT/NC


EYES: EOMI Sclera Clear 


ENT: MMM


LUNGS: Scattered rhonchi 


HEART: RRR, S1S2


ABDOMEN: NDNT Soft


EXTREMITIES: No CCE 


NEUROLOGICAL: Cranial nerves II through XII grossly intact.


PSYCH: Normal mood, normal affect.


SKIN: Warm, dry, normal turgor, no rashes or lesions noted.





LABS


                         Laboratory Results - last 24 hr











  06/27/20 06/27/20 06/27/20





  06:35 06:35 11:32


 


WBC  13.5 H  


 


RBC  4.08  


 


Hgb  12.5  


 


Hct  38.8  


 


MCV  95.2  


 


MCH  30.7  


 


MCHC  32.3  


 


RDW  12.7  


 


Plt Count  247  


 


MPV  9.2  


 


Absolute Neuts (auto)  11.1 H  


 


Neutrophils %  82.0  


 


Lymphocytes %  8.5  D  


 


Monocytes %  7.2  


 


Eosinophils %  1.5  


 


Basophils %  0.8  


 


Nucleated RBC %  0  


 


Sodium   130 L 


 


Potassium   5.2 H 


 


Chloride   97 L 


 


Carbon Dioxide   27 


 


Anion Gap   7 L 


 


BUN   18.4 H 


 


Creatinine   0.7 


 


Est GFR (CKD-EPI)AfAm   110.82 


 


Est GFR (CKD-EPI)NonAf   95.61 


 


POC Glucometer    254


 


Random Glucose   223 H 


 


Calcium   7.9 L 


 


Phosphorus   3.0 


 


Magnesium   2.0 


 


Total Bilirubin   0.9 


 


AST   22 


 


ALT   17 


 


Alkaline Phosphatase   90 


 


Total Protein   7.5 


 


Albumin   3.1 L 











HOSPITAL COURSE:





Date of Admission:06/23/20





 70 y/o M PMHx of COPD (unclear if on Home O2), CVA (no residual deficits), EtOH

use disorder, DM, Right proximal Humerus Fx who presented with EtOH intoxication

and Chest pain. Alcohol level in ED was 118. CT Head/C-Spine negative for 

fracture. EKG revealed NSR, Biatrial Enlargement, LAFB, VR 97, QTc 454. Patient 

was commenced on a Librium protocol as well as thiamine, folate, and a MV. CT 

Chest was performed---> right humeral neck fracture, enlarged mediastinal 

lymphnodes, chronic opacities in the upper lobes, subpleural reticulation in the

upper lobes and right middle lobe. CXR revealed upper lobe opacities (R>L), 

increased interstitial markings in the RML, and ipsilateral trachea and 

mediastinal deviation. Pulmonology recommended for patient to follow RLL opacity

with possible PET scan. Patient left AMA. Risks 








Date of Discharge: 06/27/20














<Clarke Amezcua - Last Filed: 06/27/20 17:58>


Physical Exam: 


SUBJECTIVE: Patient seen and examined








OBJECTIVE:





                                   Vital Signs











 Period  Temp  Pulse  Resp  BP Sys/Michael  Pulse Ox


 


 Last 24 Hr  97.5 F-99 F    18-20  102-119/53-62  97-98








PHYSICAL EXAM





GENERAL: The patient is awake, alert, and fully oriented, in no acute distress.


HEAD: Normal with no signs of trauma.


EYES: PERRL, extraocular movements intact, sclera anicteric, conjunctiva clear. 


ENT: Ears normal, nares patent, oropharynx clear without exudates, moist mucous 

membranes.


NECK: Trachea midline, full range of motion, supple. 


LUNGS: Breath sounds equal, clear to auscultation bilaterally, no wheezes, no 

crackles, no accessory muscle use. 


HEART: Regular rate and rhythm, S1, S2 without murmur, rub or gallop.


ABDOMEN: Soft, nontender, nondistended, normoactive bowel sounds, no guarding, 

no rebound, no hepatosplenomegaly, no masses.


EXTREMITIES: 2+ pulses, warm, well-perfused, no edema. 


NEUROLOGICAL: Cranial nerves II through XII grossly intact. Normal speech, gait 

not observed.


PSYCH: Normal mood, normal affect.


SKIN: Warm, dry, normal turgor, no rashes or lesions noted.





LABS


                         Laboratory Results - last 24 hr











  06/27/20 06/27/20 06/27/20





  06:35 06:35 11:32


 


WBC  13.5 H  


 


RBC  4.08  


 


Hgb  12.5  


 


Hct  38.8  


 


MCV  95.2  


 


MCH  30.7  


 


MCHC  32.3  


 


RDW  12.7  


 


Plt Count  247  


 


MPV  9.2  


 


Absolute Neuts (auto)  11.1 H  


 


Neutrophils %  82.0  


 


Lymphocytes %  8.5  D  


 


Monocytes %  7.2  


 


Eosinophils %  1.5  


 


Basophils %  0.8  


 


Nucleated RBC %  0  


 


Sodium   130 L 


 


Potassium   5.2 H 


 


Chloride   97 L 


 


Carbon Dioxide   27 


 


Anion Gap   7 L 


 


BUN   18.4 H 


 


Creatinine   0.7 


 


Est GFR (CKD-EPI)AfAm   110.82 


 


Est GFR (CKD-EPI)NonAf   95.61 


 


POC Glucometer    254


 


Random Glucose   223 H 


 


Calcium   7.9 L 


 


Phosphorus   3.0 


 


Magnesium   2.0 


 


Total Bilirubin   0.9 


 


AST   22 


 


ALT   17 


 


Alkaline Phosphatase   90 


 


Total Protein   7.5 


 


Albumin   3.1 L 














Minutes to complete discharge: 36





<Cristian Parikh - Last Filed: 06/27/20 18:04>





Discharge Summary


Problems reviewed: Yes





- Home Medications


Comprehensive Discharge Medication List: 


Ambulatory Orders





Budesonide/Formeterol Fumarate [SYMBICORT 160/4.5mcg -] 2 inh IH BID 06/24/20 


Sitagliptin Phos/Metformin HCl [Janumet  mg Tablet] 50 - 500 mg PO BID 

06/24/20 











<Clarke Amezcua - Last Filed: 06/27/20 17:58>





- Home Medications


Comprehensive Discharge Medication List: 


Ambulatory Orders





Budesonide/Formeterol Fumarate [SYMBICORT 160/4.5mcg -] 2 inh IH BID 06/24/20 


Sitagliptin Phos/Metformin HCl [Janumet  mg Tablet] 50 - 500 mg PO BID 

06/24/20 











<Cristian Parikh - Last Filed: 06/27/20 18:04>


Reason For Visit: ELEVATED BLOOD ALCOHOL LEVEL,SYNCOPE


Condition: Good





- Instructions


Disposition: AGAINST MEDICAL ADVICE


This patient is new to me today: Yes


Date on this admission: 06/27/20


Emergency Visit: Yes


ED Registration Date: 06/23/20


Care time: The patient presented to the Emergency Department on the above date 

and was hospitalized for further evaluation of their emergent condition.


Critical Care patient: No





- Discharge Referral


Referred to [facility] No





<Cristian Parikh - Last Filed: 06/27/20 18:04>





ATTENDING PHYSICIAN STATEMENT





I saw and evaluated the patient.


I reviewed the resident's note and discussed the case with the resident.


I agree with the resident's findings and plan as documented.








SUBJECTIVE:








OBJECTIVE:








ASSESSMENT AND PLAN:








<Clarke Amezcua - Last Filed: 06/27/20 17:58>





Attending attestation:


Patient seen and examined at bedside during my rounds.  I was called back to 

come and reevaluate the patient as the patient wanted to leave AGAINST MEDICAL 

ADVICE.  On exam he is alert and awake.  He is oriented to person place and 

time.  He is a regular rate and rhythm on cardiac exam.  Lungs clear to 

auscultation bilaterally.  Abdomen is soft nontender nondistended.  He has moist

mucous membranes.


After discussion of risks of leaving AGAINST MEDICAL ADVICE without completely 

finishing  [facility] protocol for alcohol withdrawal the patient still decided he 

wanted to go home.  Risks discussed were including but not limited to alcohol 

withdrawal seizures which may lead to death, operating heavy Machinery or an 

automobile can lead to serious injury or death, Trip and falls, etc.


Benefits of remaining as an inpatient included but not limited to completing 

withdrawal protocol and waiting for a bed at [facility] to open up so that he can

try to maintain his sobriety through rehab.Despite an extensive conversation 

with the patient and counseling him he still wanted to leave.  AGAINST MEDICAL 

ADVICE form filled out. 


Hospital course and discharge summary as per above.








<Cristian Parikh - Last Filed: 06/27/20 18:04>

## 2020-07-02 ENCOUNTER — HOSPITAL ENCOUNTER (EMERGENCY)
Dept: HOSPITAL 74 - JER | Age: 70
Discharge: HOME | End: 2020-07-02
Payer: COMMERCIAL

## 2020-07-02 VITALS — TEMPERATURE: 98.1 F

## 2020-07-02 VITALS — SYSTOLIC BLOOD PRESSURE: 115 MMHG | DIASTOLIC BLOOD PRESSURE: 74 MMHG

## 2020-07-02 VITALS — BODY MASS INDEX: 24.2 KG/M2

## 2020-07-02 VITALS — HEART RATE: 98 BPM

## 2020-07-02 DIAGNOSIS — I95.9: ICD-10-CM

## 2020-07-02 DIAGNOSIS — F10.920: Primary | ICD-10-CM
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
## 2020-07-02 LAB
ALBUMIN SERPL-MCNC: 3.1 G/DL (ref 3.4–5)
ALP SERPL-CCNC: 104 U/L (ref 45–117)
ALT SERPL-CCNC: 24 U/L (ref 13–61)
ANION GAP SERPL CALC-SCNC: 7 MMOL/L (ref 8–16)
AST SERPL-CCNC: 28 U/L (ref 15–37)
BASOPHILS # BLD: 0.7 % (ref 0–2)
BILIRUB SERPL-MCNC: 0.3 MG/DL (ref 0.2–1)
BUN SERPL-MCNC: 9.3 MG/DL (ref 7–18)
CALCIUM SERPL-MCNC: 8.8 MG/DL (ref 8.5–10.1)
CHLORIDE SERPL-SCNC: 102 MMOL/L (ref 98–107)
CO2 SERPL-SCNC: 30 MMOL/L (ref 21–32)
CREAT SERPL-MCNC: 1.3 MG/DL (ref 0.55–1.3)
DEPRECATED RDW RBC AUTO: 13.2 % (ref 11.9–15.9)
EOSINOPHIL # BLD: 2 % (ref 0–4.5)
GLUCOSE SERPL-MCNC: 175 MG/DL (ref 74–106)
HCT VFR BLD CALC: 37.9 % (ref 35.4–49)
HGB BLD-MCNC: 12.4 GM/DL (ref 11.7–16.9)
INR BLD: 1.18 (ref 0.83–1.09)
LIPASE SERPL-CCNC: 455 U/L (ref 73–393)
LYMPHOCYTES # BLD: 21.6 % (ref 8–40)
MCH RBC QN AUTO: 30.7 PG (ref 25.7–33.7)
MCHC RBC AUTO-ENTMCNC: 32.7 G/DL (ref 32–35.9)
MCV RBC: 93.9 FL (ref 80–96)
MONOCYTES # BLD AUTO: 5.5 % (ref 3.8–10.2)
NEUTROPHILS # BLD: 70.2 % (ref 42.8–82.8)
PLATELET # BLD AUTO: 337 K/MM3 (ref 134–434)
PMV BLD: 8.4 FL (ref 7.5–11.1)
POTASSIUM SERPLBLD-SCNC: 3.9 MMOL/L (ref 3.5–5.1)
PROT SERPL-MCNC: 7.6 G/DL (ref 6.4–8.2)
PT PNL PPP: 13.9 SEC (ref 9.7–13)
RBC # BLD AUTO: 4.03 M/MM3 (ref 4–5.6)
SODIUM SERPL-SCNC: 139 MMOL/L (ref 136–145)
WBC # BLD AUTO: 6.5 K/MM3 (ref 4–10)

## 2020-07-02 PROCEDURE — 3E033GC INTRODUCTION OF OTHER THERAPEUTIC SUBSTANCE INTO PERIPHERAL VEIN, PERCUTANEOUS APPROACH: ICD-10-PCS | Performed by: EMERGENCY MEDICINE

## 2020-07-02 NOTE — PDOC
Documentation entered by Feliciano Russell SCRIBE, acting as scribe for 

Harriet Aleman MD.








Harriet Aleman MD:  This documentation has been prepared by the Jasmin jensen Nirvannie, SCRIBE, under my direction and personally reviewed by me in its 

entirety.  I confirm that the documentation accurately reflects all work, 

treatment, procedures, and medical decision making performed by me.  





Attending Attestation





- Resident


Resident Name: Sean Yang





- ED Attending Attestation


I have performed the following: I have examined & evaluated the patient, The 

case was reviewed & discussed with the resident, I agree w/resident's findings &

plan, Exceptions are as noted





- HPI


HPI: 





07/02/20 16:45


69 y male h/o  EtOH abuse, CVA (no residual deficits), hx of hypotension, and 

COPD via EMS with alcohol intoxication. Per EMS, the patient was found on the 

ground, hypotensive, and intoxicated. Per EMR, the patient was recently admitted

06/23-27 for syncope, hypotension, right subacute humeral fx,etoh intox at which

time he signed out AMA. pt reports drinking etoh today, denies other ingestions 

or complaints. 





07/02/20 19:30








- Physicial Exam


PE: 





07/02/20 19:31


awake but drowsy, lungs clear bilat heart rrr no mrg abd soft nt nd ext wwp. no 

edema. no calf tenderness. nuero slurred speech, moves all four ext. eyes open. 





- Medical Decision Making





07/02/20 19:32


69 y mal eh/o etoh abuse here likely intoxicated found down.


concerns for head trauma other trauma as pt cannot provide story intoxicated.


plan ct head, neck abd/ pelvis. cxr.


ivf.


pt intially hypotensive , given 2 L NS bolus, lactic acid.elevated 2.7 chip 

repeat.


pt bp repsponded to fluids.


pt became agitated and pulled out IV. still slurred speech, unsteady on feet.


awaiting ct read. 


signed out to oncoming attending dr coleman, for reassessment, ct results and repeat

lactic acid. 





Discharge





- Discharge Information


Problems reviewed: Yes


Clinical Impression/Diagnosis: 


Alcohol intoxication


Qualifiers:


 Complication of substance-induced condition: uncomplicated Qualified Code(s): 

F10.920 - Alcohol use, unspecified with intoxication, uncomplicated





Hypotension


Qualifiers:


 Hypotension type: unspecified hypotension type Qualified Code(s): I95.9 - 

Hypotension, unspecified





Fall


Qualifiers:


 Encounter type: initial encounter Qualified Code(s): W19.XXXA - Unspecified 

fall, initial encounter





Condition: Stable





- Follow up/Referral





- Patient Discharge Instructions





- Post Discharge Activity
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
## 2020-07-03 NOTE — EKG
Test Reason : 

Blood Pressure : ***/*** mmHG

Vent. Rate : 102 BPM     Atrial Rate : 102 BPM

   P-R Int : 116 ms          QRS Dur : 100 ms

    QT Int : 346 ms       P-R-T Axes : 062 -80 069 degrees

   QTc Int : 450 ms

 

SINUS TACHYCARDIA

POSSIBLE LEFT ATRIAL ENLARGEMENT

LEFT AXIS DEVIATION

ABNORMAL ECG

WHEN COMPARED WITH ECG OF 05-MAY-2020 19:24,

NO SIGNIFICANT CHANGE WAS FOUND

Confirmed by COLIN CONTRERAS MD ([id number]) on 7/3/2020 10:47:19 AM

 

Referred By:             Confirmed By:COLIN CONTRERAS MD

## 2020-07-05 ENCOUNTER — HOSPITAL ENCOUNTER (EMERGENCY)
Dept: HOSPITAL 74 - JER | Age: 70
LOS: 1 days | Discharge: ANOTHER HOSPITAL | End: 2020-07-06
Payer: COMMERCIAL

## 2020-07-05 VITALS — BODY MASS INDEX: 16.9 KG/M2

## 2020-07-05 DIAGNOSIS — S92.154A: Primary | ICD-10-CM

## 2020-07-06 VITALS — DIASTOLIC BLOOD PRESSURE: 63 MMHG | HEART RATE: 90 BPM | SYSTOLIC BLOOD PRESSURE: 103 MMHG

## 2020-07-06 VITALS — TEMPERATURE: 98.5 F
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
## 2020-11-19 ENCOUNTER — HOSPITAL ENCOUNTER (INPATIENT)
Dept: HOSPITAL 74 - JER | Age: 70
LOS: 5 days | Discharge: NURSING, REHAB OR LONG-TERM CARE | DRG: 190 | End: 2020-11-24
Attending: INTERNAL MEDICINE | Admitting: INTERNAL MEDICINE
Payer: COMMERCIAL

## 2020-11-19 VITALS — BODY MASS INDEX: 16.6 KG/M2

## 2020-11-19 DIAGNOSIS — M79.671: ICD-10-CM

## 2020-11-19 DIAGNOSIS — Z86.73: ICD-10-CM

## 2020-11-19 DIAGNOSIS — Z59.0: ICD-10-CM

## 2020-11-19 DIAGNOSIS — E11.9: ICD-10-CM

## 2020-11-19 DIAGNOSIS — E43: ICD-10-CM

## 2020-11-19 DIAGNOSIS — G95.9: ICD-10-CM

## 2020-11-19 DIAGNOSIS — E11.65: ICD-10-CM

## 2020-11-19 DIAGNOSIS — F17.210: ICD-10-CM

## 2020-11-19 DIAGNOSIS — E87.1: ICD-10-CM

## 2020-11-19 DIAGNOSIS — R59.0: ICD-10-CM

## 2020-11-19 DIAGNOSIS — R59.1: ICD-10-CM

## 2020-11-19 DIAGNOSIS — I45.10: ICD-10-CM

## 2020-11-19 DIAGNOSIS — J98.4: ICD-10-CM

## 2020-11-19 DIAGNOSIS — J44.1: Primary | ICD-10-CM

## 2020-11-19 DIAGNOSIS — F10.229: ICD-10-CM

## 2020-11-19 DIAGNOSIS — R47.81: ICD-10-CM

## 2020-11-19 LAB
ALBUMIN SERPL-MCNC: 3.4 G/DL (ref 3.4–5)
ALP SERPL-CCNC: 94 U/L (ref 45–117)
ALT SERPL-CCNC: 15 U/L (ref 13–61)
ANION GAP SERPL CALC-SCNC: 4 MMOL/L (ref 8–16)
APPEARANCE UR: CLEAR
AST SERPL-CCNC: 23 U/L (ref 15–37)
BASOPHILS # BLD: 0.9 % (ref 0–2)
BILIRUB SERPL-MCNC: 0.3 MG/DL (ref 0.2–1)
BILIRUB UR STRIP.AUTO-MCNC: NEGATIVE MG/DL
BUN SERPL-MCNC: 11.2 MG/DL (ref 7–18)
CALCIUM SERPL-MCNC: 9.1 MG/DL (ref 8.5–10.1)
CHLORIDE SERPL-SCNC: 101 MMOL/L (ref 98–107)
CO2 SERPL-SCNC: 29 MMOL/L (ref 21–32)
COLOR UR: YELLOW
CREAT SERPL-MCNC: 0.7 MG/DL (ref 0.55–1.3)
DEPRECATED RDW RBC AUTO: 12.6 % (ref 11.9–15.9)
EOSINOPHIL # BLD: 4.4 % (ref 0–4.5)
GLUCOSE SERPL-MCNC: 209 MG/DL (ref 74–106)
HCT VFR BLD CALC: 38.7 % (ref 35.4–49)
HGB BLD-MCNC: 12.6 GM/DL (ref 11.7–16.9)
KETONES UR QL STRIP: NEGATIVE
LEUKOCYTE ESTERASE UR QL STRIP.AUTO: NEGATIVE
LYMPHOCYTES # BLD: 14.5 % (ref 8–40)
MCH RBC QN AUTO: 30.6 PG (ref 25.7–33.7)
MCHC RBC AUTO-ENTMCNC: 32.5 G/DL (ref 32–35.9)
MCV RBC: 94.3 FL (ref 80–96)
MONOCYTES # BLD AUTO: 8.5 % (ref 3.8–10.2)
NEUTROPHILS # BLD: 71.7 % (ref 42.8–82.8)
NITRITE UR QL STRIP: NEGATIVE
PH UR: 6.5 [PH] (ref 5–8)
PLATELET # BLD AUTO: 245 K/MM3 (ref 134–434)
PMV BLD: 8.8 FL (ref 7.5–11.1)
POTASSIUM SERPLBLD-SCNC: 4.7 MMOL/L (ref 3.5–5.1)
PROT SERPL-MCNC: 7.7 G/DL (ref 6.4–8.2)
PROT UR QL STRIP: (no result)
PROT UR QL STRIP: (no result)
RBC # BLD AUTO: 4.1 M/MM3 (ref 4–5.6)
SODIUM SERPL-SCNC: 134 MMOL/L (ref 136–145)
SP GR UR: 1.03 (ref 1.01–1.03)
UROBILINOGEN UR STRIP-MCNC: 1 MG/DL (ref 0.2–1)
WBC # BLD AUTO: 6.8 K/MM3 (ref 4–10)

## 2020-11-19 PROCEDURE — U0003 INFECTIOUS AGENT DETECTION BY NUCLEIC ACID (DNA OR RNA); SEVERE ACUTE RESPIRATORY SYNDROME CORONAVIRUS 2 (SARS-COV-2) (CORONAVIRUS DISEASE [COVID-19]), AMPLIFIED PROBE TECHNIQUE, MAKING USE OF HIGH THROUGHPUT TECHNOLOGIES AS DESCRIBED BY CMS-2020-01-R: HCPCS

## 2020-11-19 PROCEDURE — C9803 HOPD COVID-19 SPEC COLLECT: HCPCS

## 2020-11-19 SDOH — ECONOMIC STABILITY - HOUSING INSECURITY: HOMELESSNESS: Z59.0

## 2020-11-20 LAB
ALBUMIN SERPL-MCNC: 3 G/DL (ref 3.4–5)
ALP SERPL-CCNC: 85 U/L (ref 45–117)
ALT SERPL-CCNC: 13 U/L (ref 13–61)
ANION GAP SERPL CALC-SCNC: 4 MMOL/L (ref 8–16)
AST SERPL-CCNC: 14 U/L (ref 15–37)
BASOPHILS # BLD: 1.2 % (ref 0–2)
BILIRUB SERPL-MCNC: 0.2 MG/DL (ref 0.2–1)
BUN SERPL-MCNC: 10.7 MG/DL (ref 7–18)
CALCIUM SERPL-MCNC: 8.6 MG/DL (ref 8.5–10.1)
CHLORIDE SERPL-SCNC: 106 MMOL/L (ref 98–107)
CHOLEST SERPL-MCNC: 135 MG/DL (ref 50–200)
CO2 SERPL-SCNC: 28 MMOL/L (ref 21–32)
CREAT SERPL-MCNC: 0.6 MG/DL (ref 0.55–1.3)
DEPRECATED RDW RBC AUTO: 12.9 % (ref 11.9–15.9)
EOSINOPHIL # BLD: 6.9 % (ref 0–4.5)
GLUCOSE SERPL-MCNC: 163 MG/DL (ref 74–106)
HCT VFR BLD CALC: 37.6 % (ref 35.4–49)
HDLC SERPL-MCNC: 55 MG/DL (ref 40–60)
HGB BLD-MCNC: 12 GM/DL (ref 11.7–16.9)
LDLC SERPL CALC-MCNC: 75 MG/DL (ref 5–100)
LYMPHOCYTES # BLD: 24 % (ref 8–40)
MAGNESIUM SERPL-MCNC: 2.3 MG/DL (ref 1.8–2.4)
MCH RBC QN AUTO: 29.9 PG (ref 25.7–33.7)
MCHC RBC AUTO-ENTMCNC: 31.9 G/DL (ref 32–35.9)
MCV RBC: 93.9 FL (ref 80–96)
MONOCYTES # BLD AUTO: 8.9 % (ref 3.8–10.2)
NEUTROPHILS # BLD: 59 % (ref 42.8–82.8)
PHOSPHATE SERPL-MCNC: 3.4 MG/DL (ref 2.5–4.9)
PLATELET # BLD AUTO: 230 K/MM3 (ref 134–434)
PMV BLD: 8.9 FL (ref 7.5–11.1)
POTASSIUM SERPLBLD-SCNC: 4.3 MMOL/L (ref 3.5–5.1)
PROT SERPL-MCNC: 7 G/DL (ref 6.4–8.2)
RBC # BLD AUTO: 4.01 M/MM3 (ref 4–5.6)
SODIUM SERPL-SCNC: 138 MMOL/L (ref 136–145)
TRIGL SERPL-MCNC: 81 MG/DL (ref 0–150)
WBC # BLD AUTO: 5.9 K/MM3 (ref 4–10)

## 2020-11-20 RX ADMIN — FOLIC ACID SCH MG: 1 TABLET ORAL at 09:11

## 2020-11-20 RX ADMIN — Medication SCH MG: at 09:11

## 2020-11-20 RX ADMIN — IPRATROPIUM BROMIDE AND ALBUTEROL SULFATE SCH: .5; 3 SOLUTION RESPIRATORY (INHALATION) at 20:00

## 2020-11-20 RX ADMIN — ENOXAPARIN SODIUM SCH MG: 40 INJECTION SUBCUTANEOUS at 09:10

## 2020-11-20 RX ADMIN — MULTIVITAMIN TABLET SCH TAB: TABLET at 09:11

## 2020-11-20 RX ADMIN — ASPIRIN 81 MG SCH MG: 81 TABLET ORAL at 09:11

## 2020-11-20 RX ADMIN — IPRATROPIUM BROMIDE AND ALBUTEROL SULFATE SCH: .5; 3 SOLUTION RESPIRATORY (INHALATION) at 08:19

## 2020-11-20 RX ADMIN — INSULIN ASPART SCH UNITS: 100 INJECTION, SOLUTION INTRAVENOUS; SUBCUTANEOUS at 11:26

## 2020-11-20 RX ADMIN — INSULIN ASPART SCH UNITS: 100 INJECTION, SOLUTION INTRAVENOUS; SUBCUTANEOUS at 17:46

## 2020-11-20 RX ADMIN — IPRATROPIUM BROMIDE AND ALBUTEROL SULFATE SCH: .5; 3 SOLUTION RESPIRATORY (INHALATION) at 15:40

## 2020-11-20 RX ADMIN — IPRATROPIUM BROMIDE AND ALBUTEROL SULFATE SCH: .5; 3 SOLUTION RESPIRATORY (INHALATION) at 12:01

## 2020-11-20 RX ADMIN — INSULIN ASPART SCH: 100 INJECTION, SOLUTION INTRAVENOUS; SUBCUTANEOUS at 06:34

## 2020-11-21 LAB
ANION GAP SERPL CALC-SCNC: 5 MMOL/L (ref 8–16)
BUN SERPL-MCNC: 15.2 MG/DL (ref 7–18)
CALCIUM SERPL-MCNC: 8.6 MG/DL (ref 8.5–10.1)
CHLORIDE SERPL-SCNC: 105 MMOL/L (ref 98–107)
CO2 SERPL-SCNC: 28 MMOL/L (ref 21–32)
CREAT SERPL-MCNC: 0.7 MG/DL (ref 0.55–1.3)
DEPRECATED RDW RBC AUTO: 12.6 % (ref 11.9–15.9)
GLUCOSE SERPL-MCNC: 207 MG/DL (ref 74–106)
HCT VFR BLD CALC: 38 % (ref 35.4–49)
HGB BLD-MCNC: 12.2 GM/DL (ref 11.7–16.9)
MCH RBC QN AUTO: 30.2 PG (ref 25.7–33.7)
MCHC RBC AUTO-ENTMCNC: 32 G/DL (ref 32–35.9)
MCV RBC: 94.1 FL (ref 80–96)
PLATELET # BLD AUTO: 235 K/MM3 (ref 134–434)
PMV BLD: 8.6 FL (ref 7.5–11.1)
POTASSIUM SERPLBLD-SCNC: 4.2 MMOL/L (ref 3.5–5.1)
RBC # BLD AUTO: 4.04 M/MM3 (ref 4–5.6)
SODIUM SERPL-SCNC: 137 MMOL/L (ref 136–145)
WBC # BLD AUTO: 6.1 K/MM3 (ref 4–10)

## 2020-11-21 RX ADMIN — INSULIN ASPART SCH UNITS: 100 INJECTION, SOLUTION INTRAVENOUS; SUBCUTANEOUS at 12:01

## 2020-11-21 RX ADMIN — ASPIRIN 81 MG SCH MG: 81 TABLET ORAL at 09:59

## 2020-11-21 RX ADMIN — Medication SCH MG: at 10:00

## 2020-11-21 RX ADMIN — IPRATROPIUM BROMIDE AND ALBUTEROL SULFATE SCH: .5; 3 SOLUTION RESPIRATORY (INHALATION) at 11:47

## 2020-11-21 RX ADMIN — IPRATROPIUM BROMIDE AND ALBUTEROL SULFATE SCH: .5; 3 SOLUTION RESPIRATORY (INHALATION) at 09:58

## 2020-11-21 RX ADMIN — MULTIVITAMIN TABLET SCH TAB: TABLET at 09:59

## 2020-11-21 RX ADMIN — ENOXAPARIN SODIUM SCH MG: 40 INJECTION SUBCUTANEOUS at 09:59

## 2020-11-21 RX ADMIN — FOLIC ACID SCH MG: 1 TABLET ORAL at 09:59

## 2020-11-21 RX ADMIN — INSULIN ASPART SCH: 100 INJECTION, SOLUTION INTRAVENOUS; SUBCUTANEOUS at 07:10

## 2020-11-21 RX ADMIN — INSULIN ASPART SCH: 100 INJECTION, SOLUTION INTRAVENOUS; SUBCUTANEOUS at 18:00

## 2020-11-22 LAB
ANION GAP SERPL CALC-SCNC: 5 MMOL/L (ref 8–16)
BUN SERPL-MCNC: 18.1 MG/DL (ref 7–18)
CALCIUM SERPL-MCNC: 9.2 MG/DL (ref 8.5–10.1)
CHLORIDE SERPL-SCNC: 104 MMOL/L (ref 98–107)
CO2 SERPL-SCNC: 29 MMOL/L (ref 21–32)
CREAT SERPL-MCNC: 0.7 MG/DL (ref 0.55–1.3)
DEPRECATED RDW RBC AUTO: 12.4 % (ref 11.9–15.9)
GLUCOSE SERPL-MCNC: 73 MG/DL (ref 74–106)
HCT VFR BLD CALC: 40.7 % (ref 35.4–49)
HGB BLD-MCNC: 13.4 GM/DL (ref 11.7–16.9)
MCH RBC QN AUTO: 31 PG (ref 25.7–33.7)
MCHC RBC AUTO-ENTMCNC: 33 G/DL (ref 32–35.9)
MCV RBC: 94.1 FL (ref 80–96)
PLATELET # BLD AUTO: 267 K/MM3 (ref 134–434)
PMV BLD: 8.5 FL (ref 7.5–11.1)
POTASSIUM SERPLBLD-SCNC: 3.8 MMOL/L (ref 3.5–5.1)
RBC # BLD AUTO: 4.33 M/MM3 (ref 4–5.6)
SODIUM SERPL-SCNC: 138 MMOL/L (ref 136–145)
WBC # BLD AUTO: 7.4 K/MM3 (ref 4–10)

## 2020-11-22 RX ADMIN — INSULIN ASPART SCH UNITS: 100 INJECTION, SOLUTION INTRAVENOUS; SUBCUTANEOUS at 06:38

## 2020-11-22 RX ADMIN — INSULIN ASPART SCH: 100 INJECTION, SOLUTION INTRAVENOUS; SUBCUTANEOUS at 17:43

## 2020-11-22 RX ADMIN — IPRATROPIUM BROMIDE AND ALBUTEROL SULFATE SCH AMP: .5; 3 SOLUTION RESPIRATORY (INHALATION) at 08:45

## 2020-11-22 RX ADMIN — IPRATROPIUM BROMIDE AND ALBUTEROL SULFATE SCH: .5; 3 SOLUTION RESPIRATORY (INHALATION) at 11:50

## 2020-11-22 RX ADMIN — ASPIRIN 81 MG SCH MG: 81 TABLET ORAL at 09:44

## 2020-11-22 RX ADMIN — INSULIN ASPART SCH UNITS: 100 INJECTION, SOLUTION INTRAVENOUS; SUBCUTANEOUS at 22:36

## 2020-11-22 RX ADMIN — INSULIN ASPART SCH: 100 INJECTION, SOLUTION INTRAVENOUS; SUBCUTANEOUS at 12:23

## 2020-11-22 RX ADMIN — Medication SCH MG: at 09:44

## 2020-11-22 RX ADMIN — ENOXAPARIN SODIUM SCH MG: 40 INJECTION SUBCUTANEOUS at 09:44

## 2020-11-22 RX ADMIN — FOLIC ACID SCH MG: 1 TABLET ORAL at 09:44

## 2020-11-22 RX ADMIN — INSULIN ASPART SCH UNITS: 100 INJECTION, SOLUTION INTRAVENOUS; SUBCUTANEOUS at 06:40

## 2020-11-22 RX ADMIN — MULTIVITAMIN TABLET SCH TAB: TABLET at 09:44

## 2020-11-22 RX ADMIN — INSULIN ASPART SCH: 100 INJECTION, SOLUTION INTRAVENOUS; SUBCUTANEOUS at 06:18

## 2020-11-22 RX ADMIN — IPRATROPIUM BROMIDE AND ALBUTEROL SULFATE SCH: .5; 3 SOLUTION RESPIRATORY (INHALATION) at 20:19

## 2020-11-22 RX ADMIN — IPRATROPIUM BROMIDE AND ALBUTEROL SULFATE SCH: .5; 3 SOLUTION RESPIRATORY (INHALATION) at 15:44

## 2020-11-23 LAB
ANION GAP SERPL CALC-SCNC: 7 MMOL/L (ref 8–16)
BUN SERPL-MCNC: 22.1 MG/DL (ref 7–18)
CALCIUM SERPL-MCNC: 9.2 MG/DL (ref 8.5–10.1)
CHLORIDE SERPL-SCNC: 101 MMOL/L (ref 98–107)
CHOLEST SERPL-MCNC: 189 MG/DL (ref 50–200)
CO2 SERPL-SCNC: 24 MMOL/L (ref 21–32)
CREAT SERPL-MCNC: 0.8 MG/DL (ref 0.55–1.3)
DEPRECATED RDW RBC AUTO: 12.7 % (ref 11.9–15.9)
GLUCOSE SERPL-MCNC: 284 MG/DL (ref 74–106)
HCT VFR BLD CALC: 42.3 % (ref 35.4–49)
HDLC SERPL-MCNC: 69 MG/DL (ref 40–60)
HGB BLD-MCNC: 13.6 GM/DL (ref 11.7–16.9)
LDLC SERPL CALC-MCNC: 111 MG/DL (ref 5–100)
MCH RBC QN AUTO: 30 PG (ref 25.7–33.7)
MCHC RBC AUTO-ENTMCNC: 32.1 G/DL (ref 32–35.9)
MCV RBC: 93.7 FL (ref 80–96)
PLATELET # BLD AUTO: 265 K/MM3 (ref 134–434)
PMV BLD: 8.9 FL (ref 7.5–11.1)
POTASSIUM SERPLBLD-SCNC: 4.9 MMOL/L (ref 3.5–5.1)
RBC # BLD AUTO: 4.51 M/MM3 (ref 4–5.6)
SODIUM SERPL-SCNC: 131 MMOL/L (ref 136–145)
TRIGL SERPL-MCNC: 71 MG/DL (ref 0–150)
WBC # BLD AUTO: 11.1 K/MM3 (ref 4–10)

## 2020-11-23 RX ADMIN — INSULIN ASPART SCH UNITS: 100 INJECTION, SOLUTION INTRAVENOUS; SUBCUTANEOUS at 16:20

## 2020-11-23 RX ADMIN — IPRATROPIUM BROMIDE AND ALBUTEROL SULFATE SCH: .5; 3 SOLUTION RESPIRATORY (INHALATION) at 12:08

## 2020-11-23 RX ADMIN — ASPIRIN 81 MG SCH MG: 81 TABLET ORAL at 10:19

## 2020-11-23 RX ADMIN — ENOXAPARIN SODIUM SCH MG: 40 INJECTION SUBCUTANEOUS at 10:19

## 2020-11-23 RX ADMIN — IPRATROPIUM BROMIDE AND ALBUTEROL SULFATE SCH: .5; 3 SOLUTION RESPIRATORY (INHALATION) at 15:36

## 2020-11-23 RX ADMIN — Medication SCH MG: at 10:20

## 2020-11-23 RX ADMIN — FOLIC ACID SCH MG: 1 TABLET ORAL at 10:19

## 2020-11-23 RX ADMIN — METHYLPREDNISOLONE SODIUM SUCCINATE SCH MG: 40 INJECTION, POWDER, FOR SOLUTION INTRAMUSCULAR; INTRAVENOUS at 01:29

## 2020-11-23 RX ADMIN — IPRATROPIUM BROMIDE AND ALBUTEROL SULFATE SCH AMP: .5; 3 SOLUTION RESPIRATORY (INHALATION) at 08:30

## 2020-11-23 RX ADMIN — INSULIN ASPART SCH UNITS: 100 INJECTION, SOLUTION INTRAVENOUS; SUBCUTANEOUS at 21:26

## 2020-11-23 RX ADMIN — METHYLPREDNISOLONE SODIUM SUCCINATE SCH MG: 40 INJECTION, POWDER, FOR SOLUTION INTRAMUSCULAR; INTRAVENOUS at 10:19

## 2020-11-23 RX ADMIN — INSULIN ASPART SCH UNITS: 100 INJECTION, SOLUTION INTRAVENOUS; SUBCUTANEOUS at 10:44

## 2020-11-23 RX ADMIN — INSULIN ASPART SCH UNITS: 100 INJECTION, SOLUTION INTRAVENOUS; SUBCUTANEOUS at 06:54

## 2020-11-23 RX ADMIN — IPRATROPIUM BROMIDE AND ALBUTEROL SULFATE SCH: .5; 3 SOLUTION RESPIRATORY (INHALATION) at 19:40

## 2020-11-23 RX ADMIN — METHYLPREDNISOLONE SODIUM SUCCINATE SCH MG: 40 INJECTION, POWDER, FOR SOLUTION INTRAMUSCULAR; INTRAVENOUS at 17:01

## 2020-11-23 RX ADMIN — MULTIVITAMIN TABLET SCH TAB: TABLET at 10:20

## 2020-11-24 VITALS — HEART RATE: 84 BPM | SYSTOLIC BLOOD PRESSURE: 149 MMHG | DIASTOLIC BLOOD PRESSURE: 85 MMHG | TEMPERATURE: 97.8 F

## 2020-11-24 LAB
ANION GAP SERPL CALC-SCNC: 6 MMOL/L (ref 8–16)
BUN SERPL-MCNC: 19.6 MG/DL (ref 7–18)
CALCIUM SERPL-MCNC: 9 MG/DL (ref 8.5–10.1)
CHLORIDE SERPL-SCNC: 98 MMOL/L (ref 98–107)
CO2 SERPL-SCNC: 30 MMOL/L (ref 21–32)
CREAT SERPL-MCNC: 0.8 MG/DL (ref 0.55–1.3)
DEPRECATED RDW RBC AUTO: 12.6 % (ref 11.9–15.9)
GLUCOSE SERPL-MCNC: 171 MG/DL (ref 74–106)
HCT VFR BLD CALC: 40 % (ref 35.4–49)
HGB BLD-MCNC: 13.1 GM/DL (ref 11.7–16.9)
MCH RBC QN AUTO: 30.9 PG (ref 25.7–33.7)
MCHC RBC AUTO-ENTMCNC: 32.8 G/DL (ref 32–35.9)
MCV RBC: 94.5 FL (ref 80–96)
PLATELET # BLD AUTO: 272 K/MM3 (ref 134–434)
PMV BLD: 9.1 FL (ref 7.5–11.1)
POTASSIUM SERPLBLD-SCNC: 4.2 MMOL/L (ref 3.5–5.1)
RBC # BLD AUTO: 4.24 M/MM3 (ref 4–5.6)
SODIUM SERPL-SCNC: 133 MMOL/L (ref 136–145)
WBC # BLD AUTO: 18.9 K/MM3 (ref 4–10)

## 2020-11-24 RX ADMIN — IPRATROPIUM BROMIDE AND ALBUTEROL SULFATE SCH: .5; 3 SOLUTION RESPIRATORY (INHALATION) at 12:11

## 2020-11-24 RX ADMIN — Medication SCH MG: at 09:41

## 2020-11-24 RX ADMIN — METHYLPREDNISOLONE SODIUM SUCCINATE SCH MG: 40 INJECTION, POWDER, FOR SOLUTION INTRAMUSCULAR; INTRAVENOUS at 09:41

## 2020-11-24 RX ADMIN — INSULIN ASPART SCH UNITS: 100 INJECTION, SOLUTION INTRAVENOUS; SUBCUTANEOUS at 11:37

## 2020-11-24 RX ADMIN — METHYLPREDNISOLONE SODIUM SUCCINATE SCH MG: 40 INJECTION, POWDER, FOR SOLUTION INTRAMUSCULAR; INTRAVENOUS at 01:26

## 2020-11-24 RX ADMIN — IPRATROPIUM BROMIDE AND ALBUTEROL SULFATE SCH: .5; 3 SOLUTION RESPIRATORY (INHALATION) at 08:27

## 2020-11-24 RX ADMIN — ASPIRIN 81 MG SCH MG: 81 TABLET ORAL at 09:40

## 2020-11-24 RX ADMIN — MULTIVITAMIN TABLET SCH TAB: TABLET at 09:40

## 2020-11-24 RX ADMIN — FOLIC ACID SCH MG: 1 TABLET ORAL at 09:40

## 2020-11-24 RX ADMIN — INSULIN ASPART SCH UNITS: 100 INJECTION, SOLUTION INTRAVENOUS; SUBCUTANEOUS at 06:04

## 2020-11-24 RX ADMIN — ENOXAPARIN SODIUM SCH MG: 40 INJECTION SUBCUTANEOUS at 09:42

## 2021-09-29 ENCOUNTER — HOSPITAL ENCOUNTER (EMERGENCY)
Dept: HOSPITAL 74 - JER | Age: 71
LOS: 1 days | Discharge: HOME | End: 2021-09-30
Payer: COMMERCIAL

## 2021-09-29 VITALS — BODY MASS INDEX: 17.3 KG/M2

## 2021-09-29 VITALS — TEMPERATURE: 98.8 F

## 2021-09-29 DIAGNOSIS — M54.5: Primary | ICD-10-CM

## 2021-09-29 LAB
ALBUMIN SERPL-MCNC: 3 G/DL (ref 3.4–5)
ALP SERPL-CCNC: 93 U/L (ref 45–117)
ALT SERPL-CCNC: 11 U/L (ref 13–61)
ANION GAP SERPL CALC-SCNC: 3 MMOL/L (ref 8–16)
AST SERPL-CCNC: 15 U/L (ref 15–37)
BASOPHILS # BLD: 0.9 % (ref 0–2)
BILIRUB SERPL-MCNC: 0.5 MG/DL (ref 0.2–1)
BNP SERPL-MCNC: 127.8 PG/ML (ref 5–125)
BUN SERPL-MCNC: 15.9 MG/DL (ref 7–18)
CALCIUM SERPL-MCNC: 9.3 MG/DL (ref 8.5–10.1)
CHLORIDE SERPL-SCNC: 103 MMOL/L (ref 98–107)
CO2 SERPL-SCNC: 32 MMOL/L (ref 21–32)
CREAT SERPL-MCNC: 0.8 MG/DL (ref 0.55–1.3)
DEPRECATED RDW RBC AUTO: 13.6 % (ref 11.9–15.9)
EOSINOPHIL # BLD: 10.7 % (ref 0–4.5)
GLUCOSE SERPL-MCNC: 276 MG/DL (ref 74–106)
HCT VFR BLD CALC: 41.8 % (ref 35.4–49)
HGB BLD-MCNC: 13.9 GM/DL (ref 11.7–16.9)
LYMPHOCYTES # BLD: 18.2 % (ref 8–40)
MCH RBC QN AUTO: 30.3 PG (ref 25.7–33.7)
MCHC RBC AUTO-ENTMCNC: 33.3 G/DL (ref 32–35.9)
MCV RBC: 90.9 FL (ref 80–96)
MONOCYTES # BLD AUTO: 8.7 % (ref 3.8–10.2)
NEUTROPHILS # BLD: 61.5 % (ref 42.8–82.8)
PLATELET # BLD AUTO: 250 10^3/UL (ref 134–434)
PMV BLD: 9.1 FL (ref 7.5–11.1)
PROT SERPL-MCNC: 7.7 G/DL (ref 6.4–8.2)
RBC # BLD AUTO: 4.6 M/MM3 (ref 4–5.6)
SODIUM SERPL-SCNC: 138 MMOL/L (ref 136–145)
WBC # BLD AUTO: 6.3 K/MM3 (ref 4–10)

## 2021-09-30 VITALS — HEART RATE: 85 BPM | DIASTOLIC BLOOD PRESSURE: 64 MMHG | SYSTOLIC BLOOD PRESSURE: 98 MMHG

## 2021-11-03 ENCOUNTER — HOSPITAL ENCOUNTER (INPATIENT)
Dept: HOSPITAL 74 - JER | Age: 71
LOS: 7 days | Discharge: NURSING, REHAB OR LONG-TERM CARE | DRG: 194 | End: 2021-11-10
Attending: FAMILY MEDICINE | Admitting: STUDENT IN AN ORGANIZED HEALTH CARE EDUCATION/TRAINING PROGRAM
Payer: COMMERCIAL

## 2021-11-03 VITALS — BODY MASS INDEX: 16.9 KG/M2

## 2021-11-03 DIAGNOSIS — F17.210: ICD-10-CM

## 2021-11-03 DIAGNOSIS — R64: ICD-10-CM

## 2021-11-03 DIAGNOSIS — F10.10: ICD-10-CM

## 2021-11-03 DIAGNOSIS — J44.9: ICD-10-CM

## 2021-11-03 DIAGNOSIS — Z59.00: ICD-10-CM

## 2021-11-03 DIAGNOSIS — J18.9: Primary | ICD-10-CM

## 2021-11-03 DIAGNOSIS — E11.40: ICD-10-CM

## 2021-11-03 DIAGNOSIS — E11.65: ICD-10-CM

## 2021-11-03 LAB
ALBUMIN SERPL-MCNC: 3.2 G/DL (ref 3.4–5)
ALP SERPL-CCNC: 126 U/L (ref 45–117)
ALT SERPL-CCNC: 14 U/L (ref 13–61)
AMPHET UR-MCNC: NEGATIVE NG/ML
ANION GAP SERPL CALC-SCNC: 6 MMOL/L (ref 8–16)
APPEARANCE UR: CLEAR
AST SERPL-CCNC: 27 U/L (ref 15–37)
BARBITURATES UR-MCNC: NEGATIVE UG/ML
BASOPHILS # BLD: 0.7 % (ref 0–2)
BENZODIAZ UR SCN-MCNC: NEGATIVE NG/ML
BILIRUB SERPL-MCNC: 0.5 MG/DL (ref 0.2–1)
BILIRUB UR STRIP.AUTO-MCNC: NEGATIVE MG/DL
BUN SERPL-MCNC: 9.8 MG/DL (ref 7–18)
CALCIUM SERPL-MCNC: 9.1 MG/DL (ref 8.5–10.1)
CHLORIDE SERPL-SCNC: 101 MMOL/L (ref 98–107)
CO2 SERPL-SCNC: 28 MMOL/L (ref 21–32)
COCAINE UR-MCNC: NEGATIVE NG/ML
COLOR UR: YELLOW
CREAT SERPL-MCNC: 0.8 MG/DL (ref 0.55–1.3)
DEPRECATED RDW RBC AUTO: 13.6 % (ref 11.9–15.9)
EOSINOPHIL # BLD: 7.8 % (ref 0–4.5)
GLUCOSE SERPL-MCNC: 347 MG/DL (ref 74–106)
HCT VFR BLD CALC: 42.8 % (ref 35.4–49)
HGB BLD-MCNC: 14.3 GM/DL (ref 11.7–16.9)
KETONES UR QL STRIP: NEGATIVE
LEUKOCYTE ESTERASE UR QL STRIP.AUTO: NEGATIVE
LYMPHOCYTES # BLD: 13.7 % (ref 8–40)
MAGNESIUM SERPL-MCNC: 2.1 MG/DL (ref 1.8–2.4)
MCH RBC QN AUTO: 30.1 PG (ref 25.7–33.7)
MCHC RBC AUTO-ENTMCNC: 33.5 G/DL (ref 32–35.9)
MCV RBC: 90 FL (ref 80–96)
METHADONE UR-MCNC: NEGATIVE UG/L
MONOCYTES # BLD AUTO: 6.7 % (ref 3.8–10.2)
NEUTROPHILS # BLD: 71.1 % (ref 42.8–82.8)
NITRITE UR QL STRIP: NEGATIVE
OPIATES UR QL SCN: NEGATIVE
PCP UR QL SCN: NEGATIVE
PH UR: 6.5 [PH] (ref 5–8)
PLATELET # BLD AUTO: 290 10^3/UL (ref 134–434)
PMV BLD: 8.5 FL (ref 7.5–11.1)
PROT SERPL-MCNC: 9 G/DL (ref 6.4–8.2)
PROT UR QL STRIP: (no result)
PROT UR QL STRIP: NEGATIVE
RBC # BLD AUTO: 4.76 M/MM3 (ref 4–5.6)
SODIUM SERPL-SCNC: 134 MMOL/L (ref 136–145)
SP GR UR: 1.04 (ref 1.01–1.03)
UROBILINOGEN UR STRIP-MCNC: 1 MG/DL (ref 0.2–1)
WBC # BLD AUTO: 7.7 K/MM3 (ref 4–10)

## 2021-11-03 PROCEDURE — U0003 INFECTIOUS AGENT DETECTION BY NUCLEIC ACID (DNA OR RNA); SEVERE ACUTE RESPIRATORY SYNDROME CORONAVIRUS 2 (SARS-COV-2) (CORONAVIRUS DISEASE [COVID-19]), AMPLIFIED PROBE TECHNIQUE, MAKING USE OF HIGH THROUGHPUT TECHNOLOGIES AS DESCRIBED BY CMS-2020-01-R: HCPCS

## 2021-11-03 PROCEDURE — U0005 INFEC AGEN DETEC AMPLI PROBE: HCPCS

## 2021-11-03 PROCEDURE — G0378 HOSPITAL OBSERVATION PER HR: HCPCS

## 2021-11-03 PROCEDURE — C9803 HOPD COVID-19 SPEC COLLECT: HCPCS

## 2021-11-03 RX ADMIN — INSULIN ASPART SCH UNITS: 100 INJECTION, SOLUTION INTRAVENOUS; SUBCUTANEOUS at 22:55

## 2021-11-03 RX ADMIN — ACETAMINOPHEN PRN MG: 325 TABLET ORAL at 22:54

## 2021-11-03 SDOH — ECONOMIC STABILITY - HOUSING INSECURITY: HOMELESSNESS UNSPECIFIED: Z59.00

## 2021-11-04 LAB
ALBUMIN SERPL-MCNC: 2.4 G/DL (ref 3.4–5)
ALP SERPL-CCNC: 80 U/L (ref 45–117)
ALT SERPL-CCNC: 9 U/L (ref 13–61)
ANION GAP SERPL CALC-SCNC: 7 MMOL/L (ref 8–16)
AST SERPL-CCNC: 11 U/L (ref 15–37)
BILIRUB SERPL-MCNC: 0.4 MG/DL (ref 0.2–1)
BUN SERPL-MCNC: 14.6 MG/DL (ref 7–18)
CALCIUM SERPL-MCNC: 8.5 MG/DL (ref 8.5–10.1)
CHLORIDE SERPL-SCNC: 107 MMOL/L (ref 98–107)
CO2 SERPL-SCNC: 26 MMOL/L (ref 21–32)
CREAT SERPL-MCNC: 0.8 MG/DL (ref 0.55–1.3)
DEPRECATED RDW RBC AUTO: 13.1 % (ref 11.9–15.9)
GLUCOSE SERPL-MCNC: 176 MG/DL (ref 74–106)
HCT VFR BLD CALC: 35.7 % (ref 35.4–49)
HGB BLD-MCNC: 11.8 GM/DL (ref 11.7–16.9)
MAGNESIUM SERPL-MCNC: 1.8 MG/DL (ref 1.8–2.4)
MCH RBC QN AUTO: 29.7 PG (ref 25.7–33.7)
MCHC RBC AUTO-ENTMCNC: 33.1 G/DL (ref 32–35.9)
MCV RBC: 89.7 FL (ref 80–96)
PLATELET # BLD AUTO: 222 10^3/UL (ref 134–434)
PMV BLD: 8.2 FL (ref 7.5–11.1)
PROT SERPL-MCNC: 6.5 G/DL (ref 6.4–8.2)
RBC # BLD AUTO: 3.98 M/MM3 (ref 4–5.6)
SODIUM SERPL-SCNC: 140 MMOL/L (ref 136–145)
WBC # BLD AUTO: 6 K/MM3 (ref 4–10)

## 2021-11-04 RX ADMIN — ENOXAPARIN SODIUM SCH MG: 40 INJECTION SUBCUTANEOUS at 09:10

## 2021-11-04 RX ADMIN — INSULIN ASPART SCH UNITS: 100 INJECTION, SOLUTION INTRAVENOUS; SUBCUTANEOUS at 16:57

## 2021-11-04 RX ADMIN — INSULIN ASPART SCH UNITS: 100 INJECTION, SOLUTION INTRAVENOUS; SUBCUTANEOUS at 22:23

## 2021-11-04 RX ADMIN — INSULIN ASPART SCH: 100 INJECTION, SOLUTION INTRAVENOUS; SUBCUTANEOUS at 16:52

## 2021-11-04 RX ADMIN — ACETAMINOPHEN PRN MG: 325 TABLET ORAL at 09:23

## 2021-11-04 RX ADMIN — CEFTRIAXONE SCH MLS/HR: 1 INJECTION, POWDER, FOR SOLUTION INTRAMUSCULAR; INTRAVENOUS at 09:10

## 2021-11-04 RX ADMIN — INSULIN ASPART SCH UNITS: 100 INJECTION, SOLUTION INTRAVENOUS; SUBCUTANEOUS at 06:59

## 2021-11-04 RX ADMIN — AZITHROMYCIN DIHYDRATE SCH MLS/HR: 500 INJECTION, POWDER, LYOPHILIZED, FOR SOLUTION INTRAVENOUS at 09:12

## 2021-11-04 RX ADMIN — INSULIN ASPART SCH: 100 INJECTION, SOLUTION INTRAVENOUS; SUBCUTANEOUS at 12:17

## 2021-11-05 LAB — HIV 1+2 AB+HIV1 P24 AG SERPL QL IA: NEGATIVE

## 2021-11-05 RX ADMIN — AZITHROMYCIN DIHYDRATE SCH MLS/HR: 500 INJECTION, POWDER, LYOPHILIZED, FOR SOLUTION INTRAVENOUS at 11:01

## 2021-11-05 RX ADMIN — INSULIN DETEMIR SCH UNITS: 100 INJECTION, SOLUTION SUBCUTANEOUS at 11:01

## 2021-11-05 RX ADMIN — INSULIN DETEMIR SCH UNITS: 100 INJECTION, SOLUTION SUBCUTANEOUS at 21:54

## 2021-11-05 RX ADMIN — INSULIN ASPART SCH UNITS: 100 INJECTION, SOLUTION INTRAVENOUS; SUBCUTANEOUS at 06:09

## 2021-11-05 RX ADMIN — INSULIN ASPART SCH: 100 INJECTION, SOLUTION INTRAVENOUS; SUBCUTANEOUS at 11:21

## 2021-11-05 RX ADMIN — INSULIN ASPART SCH UNITS: 100 INJECTION, SOLUTION INTRAVENOUS; SUBCUTANEOUS at 17:14

## 2021-11-05 RX ADMIN — ENOXAPARIN SODIUM SCH MG: 40 INJECTION SUBCUTANEOUS at 11:02

## 2021-11-05 RX ADMIN — ACETAMINOPHEN PRN MG: 325 TABLET ORAL at 21:43

## 2021-11-05 RX ADMIN — CEFTRIAXONE SCH MLS/HR: 1 INJECTION, POWDER, FOR SOLUTION INTRAMUSCULAR; INTRAVENOUS at 14:54

## 2021-11-05 RX ADMIN — MULTIVITAMIN TABLET SCH TAB: TABLET at 11:02

## 2021-11-06 LAB
ALBUMIN SERPL-MCNC: 2.9 G/DL (ref 3.4–5)
ALP SERPL-CCNC: 83 U/L (ref 45–117)
ALT SERPL-CCNC: 11 U/L (ref 13–61)
ANION GAP SERPL CALC-SCNC: 4 MMOL/L (ref 8–16)
AST SERPL-CCNC: 13 U/L (ref 15–37)
BASOPHILS # BLD: 0.8 % (ref 0–2)
BILIRUB SERPL-MCNC: 0.7 MG/DL (ref 0.2–1)
BUN SERPL-MCNC: 20.7 MG/DL (ref 7–18)
CALCIUM SERPL-MCNC: 8.7 MG/DL (ref 8.5–10.1)
CHLORIDE SERPL-SCNC: 104 MMOL/L (ref 98–107)
CO2 SERPL-SCNC: 28 MMOL/L (ref 21–32)
CREAT SERPL-MCNC: 0.9 MG/DL (ref 0.55–1.3)
DEPRECATED RDW RBC AUTO: 12.8 % (ref 11.9–15.9)
EOSINOPHIL # BLD: 4.6 % (ref 0–4.5)
GLUCOSE SERPL-MCNC: 47 MG/DL (ref 74–106)
HCT VFR BLD CALC: 39.7 % (ref 35.4–49)
HGB BLD-MCNC: 13.3 GM/DL (ref 11.7–16.9)
LYMPHOCYTES # BLD: 29.7 % (ref 8–40)
MAGNESIUM SERPL-MCNC: 2.1 MG/DL (ref 1.8–2.4)
MCH RBC QN AUTO: 30 PG (ref 25.7–33.7)
MCHC RBC AUTO-ENTMCNC: 33.4 G/DL (ref 32–35.9)
MCV RBC: 89.8 FL (ref 80–96)
MONOCYTES # BLD AUTO: 8.5 % (ref 3.8–10.2)
NEUTROPHILS # BLD: 56.4 % (ref 42.8–82.8)
PLATELET # BLD AUTO: 269 10^3/UL (ref 134–434)
PMV BLD: 8.6 FL (ref 7.5–11.1)
PROT SERPL-MCNC: 7.8 G/DL (ref 6.4–8.2)
RBC # BLD AUTO: 4.42 M/MM3 (ref 4–5.6)
SODIUM SERPL-SCNC: 136 MMOL/L (ref 136–145)
WBC # BLD AUTO: 6.1 K/MM3 (ref 4–10)

## 2021-11-06 RX ADMIN — INSULIN ASPART SCH UNITS: 100 INJECTION, SOLUTION INTRAVENOUS; SUBCUTANEOUS at 06:30

## 2021-11-06 RX ADMIN — ACETAMINOPHEN PRN MG: 325 TABLET ORAL at 21:16

## 2021-11-06 RX ADMIN — ACETAMINOPHEN PRN MG: 325 TABLET ORAL at 10:42

## 2021-11-06 RX ADMIN — ENOXAPARIN SODIUM SCH MG: 40 INJECTION SUBCUTANEOUS at 10:43

## 2021-11-06 RX ADMIN — INSULIN ASPART SCH: 100 INJECTION, SOLUTION INTRAVENOUS; SUBCUTANEOUS at 11:02

## 2021-11-06 RX ADMIN — MULTIVITAMIN TABLET SCH TAB: TABLET at 10:43

## 2021-11-06 RX ADMIN — AZITHROMYCIN DIHYDRATE SCH MLS/HR: 500 INJECTION, POWDER, LYOPHILIZED, FOR SOLUTION INTRAVENOUS at 10:42

## 2021-11-06 RX ADMIN — INSULIN ASPART SCH: 100 INJECTION, SOLUTION INTRAVENOUS; SUBCUTANEOUS at 18:56

## 2021-11-06 RX ADMIN — INSULIN DETEMIR SCH UNITS: 100 INJECTION, SOLUTION SUBCUTANEOUS at 22:53

## 2021-11-06 RX ADMIN — INSULIN DETEMIR SCH: 100 INJECTION, SOLUTION SUBCUTANEOUS at 10:53

## 2021-11-06 RX ADMIN — ACETAMINOPHEN PRN MG: 325 TABLET ORAL at 03:35

## 2021-11-06 RX ADMIN — CEFTRIAXONE SCH MLS/HR: 1 INJECTION, POWDER, FOR SOLUTION INTRAMUSCULAR; INTRAVENOUS at 10:43

## 2021-11-07 LAB
ALBUMIN SERPL-MCNC: 2.8 G/DL (ref 3.4–5)
ALP SERPL-CCNC: 77 U/L (ref 45–117)
ALT SERPL-CCNC: 14 U/L (ref 13–61)
ANION GAP SERPL CALC-SCNC: 4 MMOL/L (ref 8–16)
AST SERPL-CCNC: 17 U/L (ref 15–37)
BASOPHILS # BLD: 1 % (ref 0–2)
BILIRUB SERPL-MCNC: 0.8 MG/DL (ref 0.2–1)
BUN SERPL-MCNC: 11.5 MG/DL (ref 7–18)
CALCIUM SERPL-MCNC: 8.8 MG/DL (ref 8.5–10.1)
CHLORIDE SERPL-SCNC: 100 MMOL/L (ref 98–107)
CO2 SERPL-SCNC: 31 MMOL/L (ref 21–32)
CREAT SERPL-MCNC: 0.7 MG/DL (ref 0.55–1.3)
DEPRECATED RDW RBC AUTO: 12.5 % (ref 11.9–15.9)
EOSINOPHIL # BLD: 1.8 % (ref 0–4.5)
GLUCOSE SERPL-MCNC: 234 MG/DL (ref 74–106)
HCT VFR BLD CALC: 37.4 % (ref 35.4–49)
HGB BLD-MCNC: 12.6 GM/DL (ref 11.7–16.9)
LYMPHOCYTES # BLD: 13.8 % (ref 8–40)
MAGNESIUM SERPL-MCNC: 1.9 MG/DL (ref 1.8–2.4)
MCH RBC QN AUTO: 30.5 PG (ref 25.7–33.7)
MCHC RBC AUTO-ENTMCNC: 33.8 G/DL (ref 32–35.9)
MCV RBC: 90.4 FL (ref 80–96)
MONOCYTES # BLD AUTO: 7.3 % (ref 3.8–10.2)
NEUTROPHILS # BLD: 76.1 % (ref 42.8–82.8)
PLATELET # BLD AUTO: 278 10^3/UL (ref 134–434)
PMV BLD: 8.8 FL (ref 7.5–11.1)
PROT SERPL-MCNC: 7.2 G/DL (ref 6.4–8.2)
RBC # BLD AUTO: 4.14 M/MM3 (ref 4–5.6)
SODIUM SERPL-SCNC: 134 MMOL/L (ref 136–145)
WBC # BLD AUTO: 7.2 K/MM3 (ref 4–10)

## 2021-11-07 RX ADMIN — INSULIN ASPART SCH: 100 INJECTION, SOLUTION INTRAVENOUS; SUBCUTANEOUS at 06:13

## 2021-11-07 RX ADMIN — AZITHROMYCIN DIHYDRATE SCH MLS/HR: 500 INJECTION, POWDER, LYOPHILIZED, FOR SOLUTION INTRAVENOUS at 09:26

## 2021-11-07 RX ADMIN — MULTIVITAMIN TABLET SCH TAB: TABLET at 09:28

## 2021-11-07 RX ADMIN — INSULIN DETEMIR SCH: 100 INJECTION, SOLUTION SUBCUTANEOUS at 22:08

## 2021-11-07 RX ADMIN — ENOXAPARIN SODIUM SCH MG: 40 INJECTION SUBCUTANEOUS at 09:26

## 2021-11-07 RX ADMIN — INSULIN ASPART SCH UNITS: 100 INJECTION, SOLUTION INTRAVENOUS; SUBCUTANEOUS at 16:44

## 2021-11-07 RX ADMIN — INSULIN ASPART SCH UNITS: 100 INJECTION, SOLUTION INTRAVENOUS; SUBCUTANEOUS at 11:41

## 2021-11-07 RX ADMIN — ACETAMINOPHEN PRN MG: 325 TABLET ORAL at 22:05

## 2021-11-07 RX ADMIN — INSULIN DETEMIR SCH UNITS: 100 INJECTION, SOLUTION SUBCUTANEOUS at 09:25

## 2021-11-07 RX ADMIN — CEFTRIAXONE SCH MLS/HR: 1 INJECTION, POWDER, FOR SOLUTION INTRAMUSCULAR; INTRAVENOUS at 11:36

## 2021-11-08 LAB
ALBUMIN SERPL-MCNC: 2.8 G/DL (ref 3.4–5)
ALP SERPL-CCNC: 79 U/L (ref 45–117)
ALT SERPL-CCNC: 20 U/L (ref 13–61)
ANION GAP SERPL CALC-SCNC: 3 MMOL/L (ref 8–16)
AST SERPL-CCNC: 26 U/L (ref 15–37)
BASOPHILS # BLD: 4 % (ref 0–2)
BILIRUB SERPL-MCNC: 0.6 MG/DL (ref 0.2–1)
BUN SERPL-MCNC: 17.9 MG/DL (ref 7–18)
CALCIUM SERPL-MCNC: 8.7 MG/DL (ref 8.5–10.1)
CHLORIDE SERPL-SCNC: 105 MMOL/L (ref 98–107)
CO2 SERPL-SCNC: 31 MMOL/L (ref 21–32)
CREAT SERPL-MCNC: 0.9 MG/DL (ref 0.55–1.3)
DEPRECATED RDW RBC AUTO: 12.8 % (ref 11.9–15.9)
EOSINOPHIL # BLD: 3 % (ref 0–4.5)
GLUCOSE SERPL-MCNC: 85 MG/DL (ref 74–106)
HCT VFR BLD CALC: 36.4 % (ref 35.4–49)
HGB BLD-MCNC: 12.4 GM/DL (ref 11.7–16.9)
LYMPHOCYTES # BLD: 22.5 % (ref 8–40)
MAGNESIUM SERPL-MCNC: 2.1 MG/DL (ref 1.8–2.4)
MCH RBC QN AUTO: 30.4 PG (ref 25.7–33.7)
MCHC RBC AUTO-ENTMCNC: 34.1 G/DL (ref 32–35.9)
MCV RBC: 89.1 FL (ref 80–96)
MONOCYTES # BLD AUTO: 5.5 % (ref 3.8–10.2)
NEUTROPHILS # BLD: 65 % (ref 42.8–82.8)
PLATELET # BLD AUTO: 272 10^3/UL (ref 134–434)
PMV BLD: 8.5 FL (ref 7.5–11.1)
PROT SERPL-MCNC: 7.2 G/DL (ref 6.4–8.2)
RBC # BLD AUTO: 4.09 M/MM3 (ref 4–5.6)
SODIUM SERPL-SCNC: 138 MMOL/L (ref 136–145)
WBC # BLD AUTO: 6.1 K/MM3 (ref 4–10)

## 2021-11-08 RX ADMIN — INSULIN ASPART SCH: 100 INJECTION, SOLUTION INTRAVENOUS; SUBCUTANEOUS at 17:20

## 2021-11-08 RX ADMIN — INSULIN ASPART SCH: 100 INJECTION, SOLUTION INTRAVENOUS; SUBCUTANEOUS at 11:56

## 2021-11-08 RX ADMIN — INSULIN DETEMIR SCH UNITS: 100 INJECTION, SOLUTION SUBCUTANEOUS at 22:51

## 2021-11-08 RX ADMIN — INSULIN DETEMIR SCH UNITS: 100 INJECTION, SOLUTION SUBCUTANEOUS at 09:29

## 2021-11-08 RX ADMIN — ACETAMINOPHEN PRN MG: 325 TABLET ORAL at 09:32

## 2021-11-08 RX ADMIN — INSULIN ASPART SCH UNITS: 100 INJECTION, SOLUTION INTRAVENOUS; SUBCUTANEOUS at 06:24

## 2021-11-08 RX ADMIN — ENOXAPARIN SODIUM SCH MG: 40 INJECTION SUBCUTANEOUS at 09:30

## 2021-11-08 RX ADMIN — MULTIVITAMIN TABLET SCH TAB: TABLET at 09:30

## 2021-11-08 RX ADMIN — AZITHROMYCIN DIHYDRATE SCH MLS/HR: 500 INJECTION, POWDER, LYOPHILIZED, FOR SOLUTION INTRAVENOUS at 09:30

## 2021-11-08 RX ADMIN — ACETAMINOPHEN PRN MG: 325 TABLET ORAL at 22:51

## 2021-11-08 RX ADMIN — CEFTRIAXONE SCH MLS/HR: 1 INJECTION, POWDER, FOR SOLUTION INTRAMUSCULAR; INTRAVENOUS at 11:53

## 2021-11-09 RX ADMIN — INSULIN ASPART SCH UNITS: 100 INJECTION, SOLUTION INTRAVENOUS; SUBCUTANEOUS at 17:45

## 2021-11-09 RX ADMIN — MULTIVITAMIN TABLET SCH TAB: TABLET at 09:35

## 2021-11-09 RX ADMIN — ENOXAPARIN SODIUM SCH MG: 40 INJECTION SUBCUTANEOUS at 09:35

## 2021-11-09 RX ADMIN — ACETAMINOPHEN PRN MG: 325 TABLET ORAL at 04:03

## 2021-11-09 RX ADMIN — AMOXICILLIN AND CLAVULANATE POTASSIUM SCH TAB: 875; 125 TABLET, FILM COATED ORAL at 09:35

## 2021-11-09 RX ADMIN — INSULIN ASPART SCH UNITS: 100 INJECTION, SOLUTION INTRAVENOUS; SUBCUTANEOUS at 06:22

## 2021-11-09 RX ADMIN — INSULIN DETEMIR SCH UNITS: 100 INJECTION, SOLUTION SUBCUTANEOUS at 21:32

## 2021-11-09 RX ADMIN — AMOXICILLIN AND CLAVULANATE POTASSIUM SCH TAB: 875; 125 TABLET, FILM COATED ORAL at 17:46

## 2021-11-09 RX ADMIN — ACETAMINOPHEN PRN MG: 325 TABLET ORAL at 09:35

## 2021-11-09 RX ADMIN — INSULIN ASPART SCH: 100 INJECTION, SOLUTION INTRAVENOUS; SUBCUTANEOUS at 12:14

## 2021-11-09 RX ADMIN — INSULIN DETEMIR SCH UNITS: 100 INJECTION, SOLUTION SUBCUTANEOUS at 06:22

## 2021-11-10 VITALS — SYSTOLIC BLOOD PRESSURE: 100 MMHG | DIASTOLIC BLOOD PRESSURE: 70 MMHG | HEART RATE: 85 BPM

## 2021-11-10 VITALS — TEMPERATURE: 97.5 F

## 2021-11-10 RX ADMIN — INSULIN DETEMIR SCH UNITS: 100 INJECTION, SOLUTION SUBCUTANEOUS at 06:28

## 2021-11-10 RX ADMIN — ACETAMINOPHEN PRN MG: 325 TABLET ORAL at 00:27

## 2021-11-10 RX ADMIN — ENOXAPARIN SODIUM SCH MG: 40 INJECTION SUBCUTANEOUS at 09:30

## 2021-11-10 RX ADMIN — AMOXICILLIN AND CLAVULANATE POTASSIUM SCH TAB: 875; 125 TABLET, FILM COATED ORAL at 09:30

## 2021-11-10 RX ADMIN — INSULIN ASPART SCH: 100 INJECTION, SOLUTION INTRAVENOUS; SUBCUTANEOUS at 12:20

## 2021-11-10 RX ADMIN — ACETAMINOPHEN PRN MG: 325 TABLET ORAL at 11:38

## 2021-11-10 RX ADMIN — INSULIN ASPART SCH: 100 INJECTION, SOLUTION INTRAVENOUS; SUBCUTANEOUS at 06:28

## 2021-11-10 RX ADMIN — MULTIVITAMIN TABLET SCH TAB: TABLET at 09:30

## 2022-11-29 ENCOUNTER — HOSPITAL ENCOUNTER (INPATIENT)
Dept: HOSPITAL 74 - FER | Age: 72
LOS: 8 days | Discharge: NURSING, REHAB OR LONG-TERM CARE | DRG: 637 | End: 2022-12-07
Attending: FAMILY MEDICINE | Admitting: INTERNAL MEDICINE
Payer: COMMERCIAL

## 2022-11-29 VITALS — BODY MASS INDEX: 19 KG/M2

## 2022-11-29 DIAGNOSIS — Z59.00: ICD-10-CM

## 2022-11-29 DIAGNOSIS — E11.65: Primary | ICD-10-CM

## 2022-11-29 DIAGNOSIS — Z79.4: ICD-10-CM

## 2022-11-29 DIAGNOSIS — R62.7: ICD-10-CM

## 2022-11-29 DIAGNOSIS — N47.2: ICD-10-CM

## 2022-11-29 DIAGNOSIS — N18.9: ICD-10-CM

## 2022-11-29 DIAGNOSIS — E87.1: ICD-10-CM

## 2022-11-29 DIAGNOSIS — F10.10: ICD-10-CM

## 2022-11-29 DIAGNOSIS — J18.9: ICD-10-CM

## 2022-11-29 DIAGNOSIS — I12.9: ICD-10-CM

## 2022-11-29 DIAGNOSIS — J44.9: ICD-10-CM

## 2022-11-29 DIAGNOSIS — E11.22: ICD-10-CM

## 2022-11-29 DIAGNOSIS — E78.5: ICD-10-CM

## 2022-11-29 LAB
ALBUMIN SERPL-MCNC: 3.6 G/DL (ref 3.4–5)
ALP SERPL-CCNC: 93 U/L (ref 45–117)
ALT SERPL-CCNC: 5 U/L (ref 13–61)
ANION GAP SERPL CALC-SCNC: 9 MMOL/L (ref 8–16)
AST SERPL-CCNC: 38 U/L (ref 15–37)
BASE EXCESS BLDV CALC-SCNC: 0.2 MMOL/L (ref -2–2)
BILIRUB SERPL-MCNC: 2 MG/DL (ref 0.2–1)
BUN SERPL-MCNC: 9 MG/DL (ref 7–18)
CALCIUM SERPL-MCNC: 9.7 MG/DL (ref 8.5–10)
CHLORIDE SERPL-SCNC: 95 MMOL/L (ref 98–107)
CO2 SERPL-SCNC: 28 MMOL/L (ref 21–32)
CREAT SERPL-MCNC: 0.8 MG/DL (ref 0.55–1.3)
DEPRECATED RDW RBC AUTO: 13.6 % (ref 11.9–15.9)
GLUCOSE SERPL-MCNC: 343 MG/DL (ref 74–106)
HCT VFR BLD CALC: 46.9 % (ref 35.4–49)
HCT VFR BLDV CALC: 70 % (ref 35.4–49)
HGB BLD-MCNC: 15.8 G/DL (ref 11.7–16.9)
LIPASE SERPL-CCNC: 100 U/L (ref 73–393)
MCH RBC QN AUTO: 30.5 PG (ref 25.7–33.7)
MCHC RBC AUTO-ENTMCNC: 33.6 G/DL (ref 32–35.9)
MCV RBC: 90.9 FL (ref 80–96)
PCO2 BLDV: 56.6 MMHG (ref 38–52)
PH BLDV: 7.32 [PH] (ref 7.31–7.41)
PLATELET # BLD AUTO: 328.6 10^3/UL (ref 134–434)
PLATELET BLD QL SMEAR: ADEQUATE
PMV BLD: 8.6 FL (ref 7.5–11.1)
PROT SERPL-MCNC: 8.1 G/DL (ref 6.4–8.2)
RBC # BLD AUTO: 5.16 10^6/UL (ref 4–5.6)
SAO2 % BLDV: 21.7 % (ref 70–80)
SODIUM SERPL-SCNC: 132 MMOL/L (ref 136–145)
WBC # BLD AUTO: 8 10^3/UL (ref 4–10.8)

## 2022-11-29 SDOH — ECONOMIC STABILITY - HOUSING INSECURITY: HOMELESSNESS UNSPECIFIED: Z59.00

## 2022-11-30 LAB
ANION GAP SERPL CALC-SCNC: 10 MMOL/L (ref 8–16)
BUN SERPL-MCNC: 7.6 MG/DL (ref 7–18)
CALCIUM SERPL-MCNC: 7.9 MG/DL (ref 8.5–10.1)
CHLORIDE SERPL-SCNC: 109 MMOL/L (ref 98–107)
CO2 SERPL-SCNC: 24 MMOL/L (ref 21–32)
CREAT SERPL-MCNC: 0.6 MG/DL (ref 0.55–1.3)
GLUCOSE SERPL-MCNC: 238 MG/DL (ref 74–106)
SODIUM SERPL-SCNC: 143 MMOL/L (ref 136–145)

## 2022-11-30 RX ADMIN — INSULIN DETEMIR SCH UNITS: 100 INJECTION, SOLUTION SUBCUTANEOUS at 21:43

## 2022-11-30 RX ADMIN — AZITHROMYCIN DIHYDRATE SCH MLS/HR: 500 INJECTION, POWDER, LYOPHILIZED, FOR SOLUTION INTRAVENOUS at 20:33

## 2022-11-30 RX ADMIN — CEFTRIAXONE SCH MLS/HR: 1 INJECTION, POWDER, FOR SOLUTION INTRAMUSCULAR; INTRAVENOUS at 11:45

## 2022-11-30 RX ADMIN — INSULIN DETEMIR SCH: 100 INJECTION, SOLUTION SUBCUTANEOUS at 11:45

## 2022-11-30 RX ADMIN — NYSTATIN CREAM SCH APPLIC: 100000 CREAM TOPICAL at 21:35

## 2022-11-30 RX ADMIN — NYSTATIN CREAM SCH APPLIC: 100000 CREAM TOPICAL at 11:00

## 2022-12-01 LAB
ALBUMIN SERPL-MCNC: 2.6 G/DL (ref 3.4–5)
ALP SERPL-CCNC: 74 U/L (ref 45–117)
ALT SERPL-CCNC: 11 U/L (ref 13–61)
ANION GAP SERPL CALC-SCNC: 7 MMOL/L (ref 8–16)
AST SERPL-CCNC: 17 U/L (ref 15–37)
BASOPHILS # BLD: 0.8 % (ref 0–2)
BILIRUB SERPL-MCNC: 0.5 MG/DL (ref 0.2–1)
BUN SERPL-MCNC: 10 MG/DL (ref 7–18)
CALCIUM SERPL-MCNC: 8.7 MG/DL (ref 8.5–10)
CHLORIDE SERPL-SCNC: 100 MMOL/L (ref 98–107)
CO2 SERPL-SCNC: 28 MMOL/L (ref 21–32)
CREAT SERPL-MCNC: 0.6 MG/DL (ref 0.55–1.3)
DEPRECATED RDW RBC AUTO: 12.9 % (ref 11.9–15.9)
EOSINOPHIL # BLD: 4.2 % (ref 0–4.5)
GLUCOSE SERPL-MCNC: 239 MG/DL (ref 74–106)
HCT VFR BLD CALC: 40 % (ref 35.4–49)
HGB BLD-MCNC: 13 GM/DL (ref 11.7–16.9)
LYMPHOCYTES # BLD: 22.2 % (ref 8–40)
MCH RBC QN AUTO: 29.5 PG (ref 25.7–33.7)
MCHC RBC AUTO-ENTMCNC: 32.4 G/DL (ref 32–35.9)
MCV RBC: 91 FL (ref 80–96)
MONOCYTES # BLD AUTO: 9.4 % (ref 3.8–10.2)
NEUTROPHILS # BLD: 63.4 % (ref 42.8–82.8)
PLATELET # BLD AUTO: 287 10^3/UL (ref 134–434)
PMV BLD: 9.2 FL (ref 7.5–11.1)
PROT SERPL-MCNC: 6.4 G/DL (ref 6.4–8.2)
RBC # BLD AUTO: 4.4 M/MM3 (ref 4–5.6)
SODIUM SERPL-SCNC: 135 MMOL/L (ref 136–145)
WBC # BLD AUTO: 6.1 K/MM3 (ref 4–10)

## 2022-12-01 RX ADMIN — NYSTATIN CREAM SCH APPLIC: 100000 CREAM TOPICAL at 11:01

## 2022-12-01 RX ADMIN — CEFTRIAXONE SCH MLS/HR: 1 INJECTION, POWDER, FOR SOLUTION INTRAMUSCULAR; INTRAVENOUS at 11:00

## 2022-12-01 RX ADMIN — INSULIN ASPART SCH UNITS: 100 INJECTION, SOLUTION INTRAVENOUS; SUBCUTANEOUS at 06:32

## 2022-12-01 RX ADMIN — INSULIN ASPART SCH UNITS: 100 INJECTION, SOLUTION INTRAVENOUS; SUBCUTANEOUS at 17:34

## 2022-12-01 RX ADMIN — INSULIN ASPART SCH UNITS: 100 INJECTION, SOLUTION INTRAVENOUS; SUBCUTANEOUS at 21:14

## 2022-12-01 RX ADMIN — INSULIN DETEMIR SCH UNITS: 100 INJECTION, SOLUTION SUBCUTANEOUS at 11:01

## 2022-12-01 RX ADMIN — NYSTATIN CREAM SCH APPLIC: 100000 CREAM TOPICAL at 21:08

## 2022-12-01 RX ADMIN — AZITHROMYCIN DIHYDRATE SCH MLS/HR: 500 INJECTION, POWDER, LYOPHILIZED, FOR SOLUTION INTRAVENOUS at 11:00

## 2022-12-01 RX ADMIN — INSULIN ASPART SCH UNITS: 100 INJECTION, SOLUTION INTRAVENOUS; SUBCUTANEOUS at 12:08

## 2022-12-01 RX ADMIN — INSULIN DETEMIR SCH UNITS: 100 INJECTION, SOLUTION SUBCUTANEOUS at 21:08

## 2022-12-02 LAB
ALBUMIN SERPL-MCNC: 2.7 G/DL (ref 3.4–5)
ALP SERPL-CCNC: 68 U/L (ref 45–117)
ALT SERPL-CCNC: 12 U/L (ref 13–61)
ANION GAP SERPL CALC-SCNC: 3 MMOL/L (ref 8–16)
AST SERPL-CCNC: 17 U/L (ref 15–37)
BILIRUB SERPL-MCNC: 0.6 MG/DL (ref 0.2–1)
BUN SERPL-MCNC: 12 MG/DL (ref 7–18)
CALCIUM SERPL-MCNC: 8.8 MG/DL (ref 8.5–10)
CHLORIDE SERPL-SCNC: 96 MMOL/L (ref 98–107)
CO2 SERPL-SCNC: 28 MMOL/L (ref 21–32)
CREAT SERPL-MCNC: 0.6 MG/DL (ref 0.55–1.3)
DEPRECATED RDW RBC AUTO: 12.5 % (ref 11.9–15.9)
GLUCOSE SERPL-MCNC: 244 MG/DL (ref 74–106)
HCT VFR BLD CALC: 41.2 % (ref 35.4–49)
HGB BLD-MCNC: 13.3 GM/DL (ref 11.7–16.9)
HIV 1+2 AB+HIV1 P24 AG SERPL QL IA: NEGATIVE
MCH RBC QN AUTO: 29.5 PG (ref 25.7–33.7)
MCHC RBC AUTO-ENTMCNC: 32.3 G/DL (ref 32–35.9)
MCV RBC: 91.2 FL (ref 80–96)
PLATELET # BLD AUTO: 300 10^3/UL (ref 134–434)
PMV BLD: 9.3 FL (ref 7.5–11.1)
PROT SERPL-MCNC: 6.6 G/DL (ref 6.4–8.2)
RBC # BLD AUTO: 4.52 M/MM3 (ref 4–5.6)
SODIUM SERPL-SCNC: 127 MMOL/L (ref 136–145)
WBC # BLD AUTO: 6.9 K/MM3 (ref 4–10)

## 2022-12-02 RX ADMIN — INSULIN ASPART SCH UNITS: 100 INJECTION, SOLUTION INTRAVENOUS; SUBCUTANEOUS at 21:16

## 2022-12-02 RX ADMIN — INSULIN ASPART SCH UNITS: 100 INJECTION, SOLUTION INTRAVENOUS; SUBCUTANEOUS at 12:14

## 2022-12-02 RX ADMIN — SODIUM CHLORIDE SCH MLS/HR: 9 INJECTION, SOLUTION INTRAVENOUS at 20:02

## 2022-12-02 RX ADMIN — INSULIN ASPART SCH UNITS: 100 INJECTION, SOLUTION INTRAVENOUS; SUBCUTANEOUS at 18:54

## 2022-12-02 RX ADMIN — INSULIN ASPART SCH UNITS: 100 INJECTION, SOLUTION INTRAVENOUS; SUBCUTANEOUS at 06:18

## 2022-12-02 RX ADMIN — INSULIN DETEMIR SCH UNITS: 100 INJECTION, SOLUTION SUBCUTANEOUS at 10:11

## 2022-12-02 RX ADMIN — NYSTATIN CREAM SCH APPLIC: 100000 CREAM TOPICAL at 21:13

## 2022-12-02 RX ADMIN — AZITHROMYCIN DIHYDRATE SCH MLS/HR: 500 INJECTION, POWDER, LYOPHILIZED, FOR SOLUTION INTRAVENOUS at 10:10

## 2022-12-02 RX ADMIN — INSULIN DETEMIR SCH UNITS: 100 INJECTION, SOLUTION SUBCUTANEOUS at 21:15

## 2022-12-02 RX ADMIN — NYSTATIN CREAM SCH APPLIC: 100000 CREAM TOPICAL at 10:12

## 2022-12-02 RX ADMIN — CEFTRIAXONE SCH MLS/HR: 1 INJECTION, POWDER, FOR SOLUTION INTRAMUSCULAR; INTRAVENOUS at 10:09

## 2022-12-03 LAB
ALBUMIN SERPL-MCNC: 2.7 G/DL (ref 3.4–5)
ALP SERPL-CCNC: 59 U/L (ref 45–117)
ALT SERPL-CCNC: 10 U/L (ref 13–61)
ANION GAP SERPL CALC-SCNC: 6 MMOL/L (ref 8–16)
AST SERPL-CCNC: 19 U/L (ref 15–37)
BILIRUB SERPL-MCNC: 0.7 MG/DL (ref 0.2–1)
BUN SERPL-MCNC: 13 MG/DL (ref 7–18)
CALCIUM SERPL-MCNC: 8.7 MG/DL (ref 8.5–10)
CHLORIDE SERPL-SCNC: 98 MMOL/L (ref 98–107)
CO2 SERPL-SCNC: 26 MMOL/L (ref 21–32)
CREAT SERPL-MCNC: 0.6 MG/DL (ref 0.55–1.3)
GLUCOSE SERPL-MCNC: 174 MG/DL (ref 74–106)
PROT SERPL-MCNC: 6.5 G/DL (ref 6.4–8.2)
SODIUM SERPL-SCNC: 130 MMOL/L (ref 136–145)

## 2022-12-03 RX ADMIN — INSULIN ASPART SCH: 100 INJECTION, SOLUTION INTRAVENOUS; SUBCUTANEOUS at 21:35

## 2022-12-03 RX ADMIN — SODIUM CHLORIDE SCH MLS/HR: 9 INJECTION, SOLUTION INTRAVENOUS at 21:33

## 2022-12-03 RX ADMIN — INSULIN ASPART SCH: 100 INJECTION, SOLUTION INTRAVENOUS; SUBCUTANEOUS at 16:20

## 2022-12-03 RX ADMIN — INSULIN DETEMIR SCH UNITS: 100 INJECTION, SOLUTION SUBCUTANEOUS at 21:34

## 2022-12-03 RX ADMIN — AZITHROMYCIN DIHYDRATE SCH MLS/HR: 500 INJECTION, POWDER, LYOPHILIZED, FOR SOLUTION INTRAVENOUS at 10:23

## 2022-12-03 RX ADMIN — INSULIN ASPART SCH UNITS: 100 INJECTION, SOLUTION INTRAVENOUS; SUBCUTANEOUS at 06:49

## 2022-12-03 RX ADMIN — CEFTRIAXONE SCH MLS/HR: 1 INJECTION, POWDER, FOR SOLUTION INTRAMUSCULAR; INTRAVENOUS at 10:23

## 2022-12-03 RX ADMIN — INSULIN ASPART SCH: 100 INJECTION, SOLUTION INTRAVENOUS; SUBCUTANEOUS at 19:29

## 2022-12-03 RX ADMIN — NYSTATIN CREAM SCH APPLIC: 100000 CREAM TOPICAL at 10:24

## 2022-12-03 RX ADMIN — INSULIN DETEMIR SCH UNITS: 100 INJECTION, SOLUTION SUBCUTANEOUS at 10:23

## 2022-12-03 RX ADMIN — NYSTATIN CREAM SCH APPLIC: 100000 CREAM TOPICAL at 21:34

## 2022-12-04 LAB
ANION GAP SERPL CALC-SCNC: 4 MMOL/L (ref 8–16)
BUN SERPL-MCNC: 15 MG/DL (ref 7–18)
CALCIUM SERPL-MCNC: 8.6 MG/DL (ref 8.5–10)
CHLORIDE SERPL-SCNC: 98 MMOL/L (ref 98–107)
CO2 SERPL-SCNC: 27 MMOL/L (ref 21–32)
CREAT SERPL-MCNC: 0.6 MG/DL (ref 0.55–1.3)
GLUCOSE SERPL-MCNC: 327 MG/DL (ref 74–106)
SODIUM SERPL-SCNC: 129 MMOL/L (ref 136–145)

## 2022-12-04 RX ADMIN — AZITHROMYCIN DIHYDRATE SCH MLS/HR: 500 INJECTION, POWDER, LYOPHILIZED, FOR SOLUTION INTRAVENOUS at 09:09

## 2022-12-04 RX ADMIN — CEFTRIAXONE SCH MLS/HR: 1 INJECTION, POWDER, FOR SOLUTION INTRAMUSCULAR; INTRAVENOUS at 10:47

## 2022-12-04 RX ADMIN — NYSTATIN CREAM SCH APPLIC: 100000 CREAM TOPICAL at 09:09

## 2022-12-04 RX ADMIN — INSULIN ASPART SCH: 100 INJECTION, SOLUTION INTRAVENOUS; SUBCUTANEOUS at 11:25

## 2022-12-04 RX ADMIN — NYSTATIN CREAM SCH APPLIC: 100000 CREAM TOPICAL at 21:41

## 2022-12-04 RX ADMIN — INSULIN ASPART SCH UNITS: 100 INJECTION, SOLUTION INTRAVENOUS; SUBCUTANEOUS at 16:29

## 2022-12-04 RX ADMIN — INSULIN ASPART SCH UNITS: 100 INJECTION, SOLUTION INTRAVENOUS; SUBCUTANEOUS at 06:52

## 2022-12-04 RX ADMIN — INSULIN DETEMIR SCH UNITS: 100 INJECTION, SOLUTION SUBCUTANEOUS at 21:41

## 2022-12-04 RX ADMIN — INSULIN ASPART SCH UNITS: 100 INJECTION, SOLUTION INTRAVENOUS; SUBCUTANEOUS at 21:40

## 2022-12-04 RX ADMIN — INSULIN DETEMIR SCH UNITS: 100 INJECTION, SOLUTION SUBCUTANEOUS at 09:08

## 2022-12-05 LAB
ALBUMIN SERPL-MCNC: 2.6 G/DL (ref 3.4–5)
ALP SERPL-CCNC: 54 U/L (ref 45–117)
ALT SERPL-CCNC: 9 U/L (ref 13–61)
ANION GAP SERPL CALC-SCNC: 2 MMOL/L (ref 8–16)
AST SERPL-CCNC: 16 U/L (ref 15–37)
BILIRUB SERPL-MCNC: 0.6 MG/DL (ref 0.2–1)
BUN SERPL-MCNC: 14 MG/DL (ref 7–18)
CALCIUM SERPL-MCNC: 8 MG/DL (ref 8.5–10)
CHLORIDE SERPL-SCNC: 101 MMOL/L (ref 98–107)
CO2 SERPL-SCNC: 25 MMOL/L (ref 21–32)
CREAT SERPL-MCNC: 0.6 MG/DL (ref 0.55–1.3)
GLUCOSE SERPL-MCNC: 296 MG/DL (ref 74–106)
PROT SERPL-MCNC: 6.1 G/DL (ref 6.4–8.2)
SODIUM SERPL-SCNC: 128 MMOL/L (ref 136–145)

## 2022-12-05 RX ADMIN — INSULIN ASPART SCH UNITS: 100 INJECTION, SOLUTION INTRAVENOUS; SUBCUTANEOUS at 21:19

## 2022-12-05 RX ADMIN — INSULIN ASPART SCH: 100 INJECTION, SOLUTION INTRAVENOUS; SUBCUTANEOUS at 13:35

## 2022-12-05 RX ADMIN — AZITHROMYCIN DIHYDRATE SCH: 500 INJECTION, POWDER, LYOPHILIZED, FOR SOLUTION INTRAVENOUS at 13:35

## 2022-12-05 RX ADMIN — NYSTATIN CREAM SCH APPLIC: 100000 CREAM TOPICAL at 21:20

## 2022-12-05 RX ADMIN — INSULIN ASPART SCH: 100 INJECTION, SOLUTION INTRAVENOUS; SUBCUTANEOUS at 18:12

## 2022-12-05 RX ADMIN — AZITHROMYCIN DIHYDRATE SCH MLS/HR: 500 INJECTION, POWDER, LYOPHILIZED, FOR SOLUTION INTRAVENOUS at 10:15

## 2022-12-05 RX ADMIN — CEFTRIAXONE SCH: 1 INJECTION, POWDER, FOR SOLUTION INTRAMUSCULAR; INTRAVENOUS at 13:35

## 2022-12-05 RX ADMIN — INSULIN DETEMIR SCH UNITS: 100 INJECTION, SOLUTION SUBCUTANEOUS at 10:16

## 2022-12-05 RX ADMIN — INSULIN DETEMIR SCH UNITS: 100 INJECTION, SOLUTION SUBCUTANEOUS at 21:18

## 2022-12-05 RX ADMIN — NYSTATIN CREAM SCH: 100000 CREAM TOPICAL at 10:19

## 2022-12-05 RX ADMIN — INSULIN ASPART SCH UNITS: 100 INJECTION, SOLUTION INTRAVENOUS; SUBCUTANEOUS at 06:12

## 2022-12-05 RX ADMIN — CEFTRIAXONE SCH MLS/HR: 1 INJECTION, POWDER, FOR SOLUTION INTRAMUSCULAR; INTRAVENOUS at 10:16

## 2022-12-06 VITALS — RESPIRATION RATE: 18 BRPM

## 2022-12-06 RX ADMIN — INSULIN ASPART SCH UNITS: 100 INJECTION, SOLUTION INTRAVENOUS; SUBCUTANEOUS at 21:08

## 2022-12-06 RX ADMIN — NYSTATIN CREAM SCH: 100000 CREAM TOPICAL at 09:47

## 2022-12-06 RX ADMIN — INSULIN DETEMIR SCH UNITS: 100 INJECTION, SOLUTION SUBCUTANEOUS at 09:50

## 2022-12-06 RX ADMIN — NYSTATIN CREAM SCH: 100000 CREAM TOPICAL at 21:09

## 2022-12-06 RX ADMIN — AZITHROMYCIN DIHYDRATE SCH: 500 INJECTION, POWDER, LYOPHILIZED, FOR SOLUTION INTRAVENOUS at 09:48

## 2022-12-06 RX ADMIN — INSULIN ASPART SCH UNITS: 100 INJECTION, SOLUTION INTRAVENOUS; SUBCUTANEOUS at 06:54

## 2022-12-06 RX ADMIN — CEFTRIAXONE SCH: 1 INJECTION, POWDER, FOR SOLUTION INTRAMUSCULAR; INTRAVENOUS at 09:47

## 2022-12-06 RX ADMIN — INSULIN ASPART SCH: 100 INJECTION, SOLUTION INTRAVENOUS; SUBCUTANEOUS at 17:11

## 2022-12-06 RX ADMIN — INSULIN ASPART SCH: 100 INJECTION, SOLUTION INTRAVENOUS; SUBCUTANEOUS at 11:11

## 2022-12-06 RX ADMIN — INSULIN DETEMIR SCH UNITS: 100 INJECTION, SOLUTION SUBCUTANEOUS at 21:09

## 2022-12-07 VITALS — SYSTOLIC BLOOD PRESSURE: 97 MMHG | DIASTOLIC BLOOD PRESSURE: 55 MMHG | HEART RATE: 96 BPM | TEMPERATURE: 98.2 F

## 2022-12-07 RX ADMIN — INSULIN ASPART SCH UNITS: 100 INJECTION, SOLUTION INTRAVENOUS; SUBCUTANEOUS at 06:16

## 2023-05-07 ENCOUNTER — HOSPITAL ENCOUNTER (EMERGENCY)
Dept: HOSPITAL 74 - JER | Age: 73
Discharge: HOME | End: 2023-05-07
Payer: COMMERCIAL

## 2023-05-07 VITALS — RESPIRATION RATE: 20 BRPM

## 2023-05-07 VITALS — DIASTOLIC BLOOD PRESSURE: 59 MMHG | HEART RATE: 88 BPM | SYSTOLIC BLOOD PRESSURE: 98 MMHG

## 2023-05-07 VITALS — TEMPERATURE: 98.7 F

## 2023-05-07 VITALS — BODY MASS INDEX: 16.1 KG/M2

## 2023-05-07 DIAGNOSIS — Z59.01: ICD-10-CM

## 2023-05-07 DIAGNOSIS — J47.9: Primary | ICD-10-CM

## 2023-05-07 DIAGNOSIS — Z20.822: ICD-10-CM

## 2023-05-07 DIAGNOSIS — R07.89: ICD-10-CM

## 2023-05-07 LAB
ALBUMIN SERPL-MCNC: 3.2 G/DL (ref 3.4–5)
ALP SERPL-CCNC: 88 U/L (ref 45–117)
ALT SERPL-CCNC: 17 U/L (ref 13–61)
ANION GAP SERPL CALC-SCNC: 10 MMOL/L (ref 8–16)
APTT BLD: 32.2 SECONDS (ref 25.2–36.5)
AST SERPL-CCNC: 28 U/L (ref 15–37)
BASOPHILS # BLD: 0.6 % (ref 0–2)
BILIRUB SERPL-MCNC: 0.8 MG/DL (ref 0.2–1)
BUN SERPL-MCNC: 13.9 MG/DL (ref 7–18)
CALCIUM SERPL-MCNC: 8.9 MG/DL (ref 8.5–10.1)
CHLORIDE SERPL-SCNC: 96 MMOL/L (ref 98–107)
CO2 SERPL-SCNC: 25 MMOL/L (ref 21–32)
CREAT SERPL-MCNC: 0.9 MG/DL (ref 0.55–1.3)
DEPRECATED RDW RBC AUTO: 15 % (ref 11.9–15.9)
EOSINOPHIL # BLD: 4 % (ref 0–4.5)
GLUCOSE SERPL-MCNC: 226 MG/DL (ref 74–106)
HCT VFR BLD CALC: 38.1 % (ref 35.4–49)
HGB BLD-MCNC: 12.8 GM/DL (ref 11.7–16.9)
INR BLD: 1.4 (ref 0.83–1.09)
LYMPHOCYTES # BLD: 8.3 % (ref 8–40)
MCH RBC QN AUTO: 30.4 PG (ref 25.7–33.7)
MCHC RBC AUTO-ENTMCNC: 33.7 G/DL (ref 32–35.9)
MCV RBC: 90.4 FL (ref 80–96)
MONOCYTES # BLD AUTO: 7.8 % (ref 3.8–10.2)
NEUTROPHILS # BLD: 79.3 % (ref 42.8–82.8)
PLATELET # BLD AUTO: 273 10^3/UL (ref 134–434)
PMV BLD: 7.9 FL (ref 7.5–11.1)
POTASSIUM SERPLBLD-SCNC: 4.9 MMOL/L (ref 3.5–5.1)
PROT SERPL-MCNC: 8.2 G/DL (ref 6.4–8.2)
PT PNL PPP: 16.2 SEC (ref 9.7–13)
RBC # BLD AUTO: 4.22 M/MM3 (ref 4–5.6)
SODIUM SERPL-SCNC: 131 MMOL/L (ref 136–145)
WBC # BLD AUTO: 9.7 K/MM3 (ref 4–10)

## 2023-05-07 SDOH — ECONOMIC STABILITY - HOUSING INSECURITY: SHELTERED HOMELESSNESS: Z59.01

## 2023-05-10 ENCOUNTER — HOSPITAL ENCOUNTER (EMERGENCY)
Dept: HOSPITAL 74 - JER | Age: 73
Discharge: HOME | End: 2023-05-10
Payer: COMMERCIAL

## 2023-05-10 VITALS
RESPIRATION RATE: 15 BRPM | TEMPERATURE: 97.5 F | DIASTOLIC BLOOD PRESSURE: 69 MMHG | SYSTOLIC BLOOD PRESSURE: 100 MMHG | HEART RATE: 80 BPM

## 2023-05-10 VITALS — BODY MASS INDEX: 17.2 KG/M2

## 2023-05-10 DIAGNOSIS — Z59.00: ICD-10-CM

## 2023-05-10 DIAGNOSIS — R53.1: Primary | ICD-10-CM

## 2023-05-10 SDOH — ECONOMIC STABILITY - HOUSING INSECURITY: HOMELESSNESS UNSPECIFIED: Z59.00

## 2023-06-04 ENCOUNTER — HOSPITAL ENCOUNTER (EMERGENCY)
Dept: HOSPITAL 74 - JER | Age: 73
Discharge: HOME | End: 2023-06-04
Payer: COMMERCIAL

## 2023-06-04 VITALS
RESPIRATION RATE: 18 BRPM | TEMPERATURE: 98.6 F | SYSTOLIC BLOOD PRESSURE: 116 MMHG | HEART RATE: 96 BPM | DIASTOLIC BLOOD PRESSURE: 71 MMHG

## 2023-06-04 VITALS — BODY MASS INDEX: 16.2 KG/M2

## 2023-06-04 DIAGNOSIS — Z59.00: ICD-10-CM

## 2023-06-04 DIAGNOSIS — M79.671: Primary | ICD-10-CM

## 2023-06-04 DIAGNOSIS — M79.672: ICD-10-CM

## 2023-06-04 SDOH — ECONOMIC STABILITY - HOUSING INSECURITY: HOMELESSNESS UNSPECIFIED: Z59.00

## 2023-06-30 ENCOUNTER — HOSPITAL ENCOUNTER (INPATIENT)
Dept: HOSPITAL 74 - JER | Age: 73
LOS: 13 days | Discharge: NURSING, REHAB OR LONG-TERM CARE | DRG: 871 | End: 2023-07-13
Attending: NURSE PRACTITIONER
Payer: COMMERCIAL

## 2023-06-30 VITALS — BODY MASS INDEX: 17.2 KG/M2

## 2023-06-30 DIAGNOSIS — A41.9: Primary | ICD-10-CM

## 2023-06-30 DIAGNOSIS — D72.829: ICD-10-CM

## 2023-06-30 DIAGNOSIS — F31.9: ICD-10-CM

## 2023-06-30 DIAGNOSIS — J44.0: ICD-10-CM

## 2023-06-30 DIAGNOSIS — Z59.00: ICD-10-CM

## 2023-06-30 DIAGNOSIS — E43: ICD-10-CM

## 2023-06-30 DIAGNOSIS — G93.41: ICD-10-CM

## 2023-06-30 DIAGNOSIS — F17.210: ICD-10-CM

## 2023-06-30 DIAGNOSIS — E11.40: ICD-10-CM

## 2023-06-30 DIAGNOSIS — N39.0: ICD-10-CM

## 2023-06-30 DIAGNOSIS — R64: ICD-10-CM

## 2023-06-30 DIAGNOSIS — E11.65: ICD-10-CM

## 2023-06-30 DIAGNOSIS — E78.5: ICD-10-CM

## 2023-06-30 DIAGNOSIS — J18.9: ICD-10-CM

## 2023-06-30 LAB
ALBUMIN SERPL-MCNC: 2.4 G/DL (ref 3.4–5)
ALP SERPL-CCNC: 99 U/L (ref 45–117)
ALT SERPL-CCNC: 12 U/L (ref 13–61)
ANION GAP SERPL CALC-SCNC: 9 MMOL/L (ref 8–16)
ANISOCYTOSIS BLD QL: 0
APPEARANCE UR: CLEAR
APTT BLD: 36.8 SECONDS (ref 25.2–36.5)
AST SERPL-CCNC: 12 U/L (ref 15–37)
BASE EXCESS BLDV CALC-SCNC: 2.8 MMOL/L (ref -2–2)
BILIRUB SERPL-MCNC: 0.5 MG/DL (ref 0.2–1)
BILIRUB UR STRIP.AUTO-MCNC: NEGATIVE MG/DL
BNP SERPL-MCNC: 325.6 PG/ML (ref 5–125)
BUN SERPL-MCNC: 14.1 MG/DL (ref 7–18)
CALCIUM SERPL-MCNC: 9 MG/DL (ref 8.5–10.1)
CHLORIDE SERPL-SCNC: 95 MMOL/L (ref 98–107)
CO2 SERPL-SCNC: 28 MMOL/L (ref 21–32)
COLOR UR: YELLOW
CREAT SERPL-MCNC: 0.6 MG/DL (ref 0.55–1.3)
DEPRECATED RDW RBC AUTO: 13.4 % (ref 11.9–15.9)
GLUCOSE SERPL-MCNC: 266 MG/DL (ref 74–106)
HCT VFR BLD CALC: 36.5 % (ref 35.4–49)
HCT VFR BLDV CALC: 49 % (ref 35.4–49)
HGB BLD-MCNC: 11.7 GM/DL (ref 11.7–16.9)
INR BLD: 1.61 (ref 0.83–1.09)
KETONES UR QL STRIP: NEGATIVE
LEUKOCYTE ESTERASE UR QL STRIP.AUTO: NEGATIVE
MACROCYTES BLD QL: 0
MCH RBC QN AUTO: 29 PG (ref 25.7–33.7)
MCHC RBC AUTO-ENTMCNC: 32 G/DL (ref 32–35.9)
MCV RBC: 90.4 FL (ref 80–96)
NITRITE UR QL STRIP: NEGATIVE
PCO2 BLDV: 41.1 MMHG (ref 38–52)
PH BLDV: 7.44 [PH] (ref 7.31–7.41)
PH UR: 6 [PH] (ref 5–8)
PLATELET # BLD AUTO: 408 10^3/UL (ref 134–434)
PMV BLD: 8.6 FL (ref 7.5–11.1)
POTASSIUM SERPLBLD-SCNC: 4.3 MMOL/L (ref 3.5–5.1)
PROT SERPL-MCNC: 7.4 G/DL (ref 6.4–8.2)
PROT UR QL STRIP: (no result)
PROT UR QL STRIP: (no result)
PT PNL PPP: 18.6 SEC (ref 9.7–13)
RBC # BLD AUTO: 4.04 M/MM3 (ref 4–5.6)
SAO2 % BLDV: 99.4 % (ref 70–80)
SODIUM SERPL-SCNC: 132 MMOL/L (ref 136–145)
SP GR UR: 1.03 (ref 1.01–1.03)
UROBILINOGEN UR STRIP-MCNC: 1 MG/DL (ref 0.2–1)
WBC # BLD AUTO: 13.4 K/MM3 (ref 4–10)

## 2023-06-30 RX ADMIN — INSULIN ASPART SCH: 100 INJECTION, SOLUTION INTRAVENOUS; SUBCUTANEOUS at 22:06

## 2023-06-30 RX ADMIN — INSULIN ASPART SCH UNITS: 100 INJECTION, SOLUTION INTRAVENOUS; SUBCUTANEOUS at 18:45

## 2023-06-30 RX ADMIN — ACETAMINOPHEN PRN MG: 325 TABLET ORAL at 19:56

## 2023-06-30 RX ADMIN — MIDODRINE HYDROCHLORIDE SCH MG: 5 TABLET ORAL at 22:50

## 2023-06-30 RX ADMIN — ATORVASTATIN CALCIUM SCH MG: 40 TABLET, FILM COATED ORAL at 21:58

## 2023-06-30 SDOH — ECONOMIC STABILITY - HOUSING INSECURITY: HOMELESSNESS UNSPECIFIED: Z59.00

## 2023-07-01 LAB
ALBUMIN SERPL-MCNC: 1.9 G/DL (ref 3.4–5)
ALP SERPL-CCNC: 90 U/L (ref 45–117)
ALT SERPL-CCNC: 12 U/L (ref 13–61)
ANION GAP SERPL CALC-SCNC: 7 MMOL/L (ref 8–16)
AST SERPL-CCNC: 16 U/L (ref 15–37)
BASOPHILS # BLD: 0.6 % (ref 0–2)
BILIRUB SERPL-MCNC: 0.3 MG/DL (ref 0.2–1)
BUN SERPL-MCNC: 10.5 MG/DL (ref 7–18)
CALCIUM SERPL-MCNC: 7.9 MG/DL (ref 8.5–10.1)
CHLORIDE SERPL-SCNC: 104 MMOL/L (ref 98–107)
CHOLEST SERPL-MCNC: 87 MG/DL (ref 50–200)
CO2 SERPL-SCNC: 28 MMOL/L (ref 21–32)
CREAT SERPL-MCNC: 0.5 MG/DL (ref 0.55–1.3)
DEPRECATED RDW RBC AUTO: 13.7 % (ref 11.9–15.9)
EOSINOPHIL # BLD: 0.5 % (ref 0–4.5)
GLUCOSE SERPL-MCNC: 255 MG/DL (ref 74–106)
HCT VFR BLD CALC: 33.2 % (ref 35.4–49)
HDLC SERPL-MCNC: 38 MG/DL (ref 40–60)
HGB BLD-MCNC: 10.7 GM/DL (ref 11.7–16.9)
LDLC SERPL CALC-MCNC: 41 MG/DL (ref 5–100)
LYMPHOCYTES # BLD: 4.8 % (ref 8–40)
MCH RBC QN AUTO: 29.3 PG (ref 25.7–33.7)
MCHC RBC AUTO-ENTMCNC: 32.3 G/DL (ref 32–35.9)
MCV RBC: 90.7 FL (ref 80–96)
MONOCYTES # BLD AUTO: 4.7 % (ref 3.8–10.2)
NEUTROPHILS # BLD: 89.4 % (ref 42.8–82.8)
PLATELET # BLD AUTO: 328 10^3/UL (ref 134–434)
PMV BLD: 8.3 FL (ref 7.5–11.1)
POTASSIUM SERPLBLD-SCNC: 4 MMOL/L (ref 3.5–5.1)
PROT SERPL-MCNC: 6 G/DL (ref 6.4–8.2)
RBC # BLD AUTO: 3.66 M/MM3 (ref 4–5.6)
SODIUM SERPL-SCNC: 138 MMOL/L (ref 136–145)
WBC # BLD AUTO: 9.7 K/MM3 (ref 4–10)

## 2023-07-01 RX ADMIN — INSULIN ASPART SCH UNITS: 100 INJECTION, SOLUTION INTRAVENOUS; SUBCUTANEOUS at 16:34

## 2023-07-01 RX ADMIN — INSULIN ASPART SCH UNITS: 100 INJECTION, SOLUTION INTRAVENOUS; SUBCUTANEOUS at 06:05

## 2023-07-01 RX ADMIN — INSULIN ASPART SCH UNITS: 100 INJECTION, SOLUTION INTRAVENOUS; SUBCUTANEOUS at 11:20

## 2023-07-01 RX ADMIN — BUDESONIDE AND FORMOTEROL FUMARATE DIHYDRATE SCH PUFF: 160; 4.5 AEROSOL RESPIRATORY (INHALATION) at 10:05

## 2023-07-01 RX ADMIN — AZITHROMYCIN DIHYDRATE SCH MLS/HR: 500 INJECTION, POWDER, LYOPHILIZED, FOR SOLUTION INTRAVENOUS at 14:17

## 2023-07-01 RX ADMIN — MULTIVITAMIN TABLET SCH TAB: TABLET at 10:04

## 2023-07-01 RX ADMIN — ATORVASTATIN CALCIUM SCH MG: 40 TABLET, FILM COATED ORAL at 21:30

## 2023-07-01 RX ADMIN — Medication SCH ML: at 10:04

## 2023-07-01 RX ADMIN — INSULIN ASPART SCH: 100 INJECTION, SOLUTION INTRAVENOUS; SUBCUTANEOUS at 21:14

## 2023-07-01 RX ADMIN — ENOXAPARIN SODIUM SCH MG: 40 INJECTION SUBCUTANEOUS at 10:05

## 2023-07-01 RX ADMIN — CEFTRIAXONE SCH MLS/HR: 1 INJECTION, POWDER, FOR SOLUTION INTRAMUSCULAR; INTRAVENOUS at 10:03

## 2023-07-01 RX ADMIN — SODIUM CHLORIDE, POTASSIUM CHLORIDE, SODIUM LACTATE AND CALCIUM CHLORIDE SCH MLS/HR: 600; 310; 30; 20 INJECTION, SOLUTION INTRAVENOUS at 12:40

## 2023-07-01 RX ADMIN — ATORVASTATIN CALCIUM SCH: 40 TABLET, FILM COATED ORAL at 21:14

## 2023-07-01 RX ADMIN — MIDODRINE HYDROCHLORIDE SCH MG: 5 TABLET ORAL at 10:04

## 2023-07-02 RX ADMIN — INSULIN DETEMIR SCH UNIT: 100 INJECTION, SOLUTION SUBCUTANEOUS at 11:46

## 2023-07-02 RX ADMIN — ENOXAPARIN SODIUM SCH MG: 40 INJECTION SUBCUTANEOUS at 10:04

## 2023-07-02 RX ADMIN — INSULIN ASPART SCH UNITS: 100 INJECTION, SOLUTION INTRAVENOUS; SUBCUTANEOUS at 06:20

## 2023-07-02 RX ADMIN — BUDESONIDE AND FORMOTEROL FUMARATE DIHYDRATE SCH PUFF: 160; 4.5 AEROSOL RESPIRATORY (INHALATION) at 11:48

## 2023-07-02 RX ADMIN — MULTIVITAMIN TABLET SCH TAB: TABLET at 10:04

## 2023-07-02 RX ADMIN — AZITHROMYCIN DIHYDRATE SCH MLS/HR: 500 INJECTION, POWDER, LYOPHILIZED, FOR SOLUTION INTRAVENOUS at 11:48

## 2023-07-02 RX ADMIN — Medication SCH ML: at 10:04

## 2023-07-02 RX ADMIN — ACETAMINOPHEN PRN MG: 325 TABLET ORAL at 22:14

## 2023-07-02 RX ADMIN — INSULIN DETEMIR SCH UNIT: 100 INJECTION, SOLUTION SUBCUTANEOUS at 22:15

## 2023-07-02 RX ADMIN — SODIUM CHLORIDE, POTASSIUM CHLORIDE, SODIUM LACTATE AND CALCIUM CHLORIDE SCH MLS/HR: 600; 310; 30; 20 INJECTION, SOLUTION INTRAVENOUS at 05:32

## 2023-07-02 RX ADMIN — INSULIN ASPART SCH UNITS: 100 INJECTION, SOLUTION INTRAVENOUS; SUBCUTANEOUS at 16:37

## 2023-07-02 RX ADMIN — INSULIN ASPART SCH UNITS: 100 INJECTION, SOLUTION INTRAVENOUS; SUBCUTANEOUS at 11:47

## 2023-07-02 RX ADMIN — CEFTRIAXONE SCH MLS/HR: 1 INJECTION, POWDER, FOR SOLUTION INTRAMUSCULAR; INTRAVENOUS at 10:04

## 2023-07-02 RX ADMIN — ATORVASTATIN CALCIUM SCH MG: 40 TABLET, FILM COATED ORAL at 22:14

## 2023-07-02 RX ADMIN — INSULIN ASPART SCH UNITS: 100 INJECTION, SOLUTION INTRAVENOUS; SUBCUTANEOUS at 22:15

## 2023-07-03 LAB
ALBUMIN SERPL-MCNC: 2.1 G/DL (ref 3.4–5)
ALP SERPL-CCNC: 106 U/L (ref 45–117)
ALT SERPL-CCNC: 18 U/L (ref 13–61)
ANION GAP SERPL CALC-SCNC: 5 MMOL/L (ref 8–16)
AST SERPL-CCNC: 23 U/L (ref 15–37)
BASOPHILS # BLD: 0.5 % (ref 0–2)
BILIRUB SERPL-MCNC: 0.6 MG/DL (ref 0.2–1)
BUN SERPL-MCNC: 13.5 MG/DL (ref 7–18)
CALCIUM SERPL-MCNC: 8.9 MG/DL (ref 8.5–10.1)
CHLORIDE SERPL-SCNC: 95 MMOL/L (ref 98–107)
CO2 SERPL-SCNC: 35 MMOL/L (ref 21–32)
CREAT SERPL-MCNC: 0.5 MG/DL (ref 0.55–1.3)
DEPRECATED RDW RBC AUTO: 13.6 % (ref 11.9–15.9)
EOSINOPHIL # BLD: 1.6 % (ref 0–4.5)
GLUCOSE SERPL-MCNC: 207 MG/DL (ref 74–106)
HCT VFR BLD CALC: 33.7 % (ref 35.4–49)
HGB BLD-MCNC: 11.1 GM/DL (ref 11.7–16.9)
LYMPHOCYTES # BLD: 5.1 % (ref 8–40)
MAGNESIUM SERPL-MCNC: 2 MG/DL (ref 1.8–2.4)
MCH RBC QN AUTO: 29.1 PG (ref 25.7–33.7)
MCHC RBC AUTO-ENTMCNC: 32.9 G/DL (ref 32–35.9)
MCV RBC: 88.6 FL (ref 80–96)
MONOCYTES # BLD AUTO: 7.6 % (ref 3.8–10.2)
NEUTROPHILS # BLD: 85.2 % (ref 42.8–82.8)
PLATELET # BLD AUTO: 369 10^3/UL (ref 134–434)
PMV BLD: 8.2 FL (ref 7.5–11.1)
POTASSIUM SERPLBLD-SCNC: 4.3 MMOL/L (ref 3.5–5.1)
PROT SERPL-MCNC: 6.8 G/DL (ref 6.4–8.2)
RBC # BLD AUTO: 3.81 M/MM3 (ref 4–5.6)
SODIUM SERPL-SCNC: 135 MMOL/L (ref 136–145)
WBC # BLD AUTO: 8.9 K/MM3 (ref 4–10)

## 2023-07-03 RX ADMIN — Medication SCH ML: at 08:43

## 2023-07-03 RX ADMIN — MULTIVITAMIN TABLET SCH TAB: TABLET at 09:31

## 2023-07-03 RX ADMIN — INSULIN ASPART SCH UNITS: 100 INJECTION, SOLUTION INTRAVENOUS; SUBCUTANEOUS at 06:09

## 2023-07-03 RX ADMIN — INSULIN DETEMIR SCH UNIT: 100 INJECTION, SOLUTION SUBCUTANEOUS at 06:08

## 2023-07-03 RX ADMIN — ATORVASTATIN CALCIUM SCH MG: 40 TABLET, FILM COATED ORAL at 21:22

## 2023-07-03 RX ADMIN — CEFTRIAXONE SCH MLS/HR: 1 INJECTION, POWDER, FOR SOLUTION INTRAMUSCULAR; INTRAVENOUS at 09:30

## 2023-07-03 RX ADMIN — INSULIN ASPART SCH UNITS: 100 INJECTION, SOLUTION INTRAVENOUS; SUBCUTANEOUS at 11:55

## 2023-07-03 RX ADMIN — INSULIN ASPART SCH UNITS: 100 INJECTION, SOLUTION INTRAVENOUS; SUBCUTANEOUS at 21:20

## 2023-07-03 RX ADMIN — INSULIN ASPART SCH: 100 INJECTION, SOLUTION INTRAVENOUS; SUBCUTANEOUS at 16:57

## 2023-07-03 RX ADMIN — AZITHROMYCIN DIHYDRATE SCH MLS/HR: 500 INJECTION, POWDER, LYOPHILIZED, FOR SOLUTION INTRAVENOUS at 09:29

## 2023-07-03 RX ADMIN — ACETAMINOPHEN PRN MG: 325 TABLET ORAL at 17:51

## 2023-07-03 RX ADMIN — INSULIN DETEMIR SCH UNIT: 100 INJECTION, SOLUTION SUBCUTANEOUS at 21:21

## 2023-07-03 RX ADMIN — ENOXAPARIN SODIUM SCH MG: 40 INJECTION SUBCUTANEOUS at 09:31

## 2023-07-03 RX ADMIN — BUDESONIDE AND FORMOTEROL FUMARATE DIHYDRATE SCH PUFF: 160; 4.5 AEROSOL RESPIRATORY (INHALATION) at 09:31

## 2023-07-04 LAB
ALBUMIN SERPL-MCNC: 2.3 G/DL (ref 3.4–5)
ALP SERPL-CCNC: 149 U/L (ref 45–117)
ALT SERPL-CCNC: 21 U/L (ref 13–61)
ANION GAP SERPL CALC-SCNC: 6 MMOL/L (ref 8–16)
ARTERIAL PATENCY WRIST A: POSITIVE
AST SERPL-CCNC: 30 U/L (ref 15–37)
BASE EXCESS BLDA CALC-SCNC: 0.7 MMOL/L (ref -2–2)
BASOPHILS # BLD: 0.4 % (ref 0–2)
BILIRUB SERPL-MCNC: 0.8 MG/DL (ref 0.2–1)
BUN SERPL-MCNC: 19 MG/DL (ref 7–18)
CALCIUM SERPL-MCNC: 9.2 MG/DL (ref 8.5–10.1)
CHLORIDE SERPL-SCNC: 95 MMOL/L (ref 98–107)
CO2 SERPL-SCNC: 33 MMOL/L (ref 21–32)
CREAT SERPL-MCNC: 0.6 MG/DL (ref 0.55–1.3)
DEPRECATED RDW RBC AUTO: 13.4 % (ref 11.9–15.9)
EOSINOPHIL # BLD: 0.4 % (ref 0–4.5)
GLUCOSE SERPL-MCNC: 153 MG/DL (ref 74–106)
HCT VFR BLD CALC: 33.9 % (ref 35.4–49)
HCT VFR BLDV CALC: 37 % (ref 35.4–49)
HGB BLD-MCNC: 10.8 GM/DL (ref 11.7–16.9)
LYMPHOCYTES # BLD: 4 % (ref 8–40)
MAGNESIUM SERPL-MCNC: 2.3 MG/DL (ref 1.8–2.4)
MCH RBC QN AUTO: 29 PG (ref 25.7–33.7)
MCHC RBC AUTO-ENTMCNC: 31.9 G/DL (ref 32–35.9)
MCV RBC: 90.8 FL (ref 80–96)
MONOCYTES # BLD AUTO: 6.3 % (ref 3.8–10.2)
NEUTROPHILS # BLD: 88.9 % (ref 42.8–82.8)
PCO2 BLDA: 42.3 MMHG (ref 35–45)
PHOSPHATE SERPL-MCNC: 3.6 MG/DL (ref 2.5–4.9)
PLATELET # BLD AUTO: 409 10^3/UL (ref 134–434)
PMV BLD: 8.9 FL (ref 7.5–11.1)
PO2 BLDA: 92 MMHG (ref 80–100)
POTASSIUM SERPLBLD-SCNC: 4.5 MMOL/L (ref 3.5–5.1)
PROT SERPL-MCNC: 7.7 G/DL (ref 6.4–8.2)
RBC # BLD AUTO: 3.73 M/MM3 (ref 4–5.6)
SAO2 % BLDA: 97 % (ref 95–98)
SODIUM SERPL-SCNC: 134 MMOL/L (ref 136–145)
WBC # BLD AUTO: 10.2 K/MM3 (ref 4–10)

## 2023-07-04 RX ADMIN — INSULIN ASPART SCH UNITS: 100 INJECTION, SOLUTION INTRAVENOUS; SUBCUTANEOUS at 21:24

## 2023-07-04 RX ADMIN — INSULIN DETEMIR SCH UNIT: 100 INJECTION, SOLUTION SUBCUTANEOUS at 06:07

## 2023-07-04 RX ADMIN — INSULIN ASPART SCH UNITS: 100 INJECTION, SOLUTION INTRAVENOUS; SUBCUTANEOUS at 06:06

## 2023-07-04 RX ADMIN — ENOXAPARIN SODIUM SCH MG: 40 INJECTION SUBCUTANEOUS at 09:38

## 2023-07-04 RX ADMIN — CEFTRIAXONE SCH MLS/HR: 1 INJECTION, POWDER, FOR SOLUTION INTRAMUSCULAR; INTRAVENOUS at 09:38

## 2023-07-04 RX ADMIN — MULTIVITAMIN TABLET SCH TAB: TABLET at 09:38

## 2023-07-04 RX ADMIN — INSULIN ASPART SCH UNITS: 100 INJECTION, SOLUTION INTRAVENOUS; SUBCUTANEOUS at 12:18

## 2023-07-04 RX ADMIN — Medication SCH: at 09:42

## 2023-07-04 RX ADMIN — METHYLPREDNISOLONE SODIUM SUCCINATE SCH MG: 40 INJECTION, POWDER, FOR SOLUTION INTRAMUSCULAR; INTRAVENOUS at 18:02

## 2023-07-04 RX ADMIN — BUDESONIDE AND FORMOTEROL FUMARATE DIHYDRATE SCH PUFF: 160; 4.5 AEROSOL RESPIRATORY (INHALATION) at 12:33

## 2023-07-04 RX ADMIN — ACETAMINOPHEN PRN MG: 325 TABLET ORAL at 21:19

## 2023-07-04 RX ADMIN — AZITHROMYCIN DIHYDRATE SCH MLS/HR: 500 INJECTION, POWDER, LYOPHILIZED, FOR SOLUTION INTRAVENOUS at 12:17

## 2023-07-04 RX ADMIN — ATORVASTATIN CALCIUM SCH MG: 40 TABLET, FILM COATED ORAL at 21:19

## 2023-07-04 RX ADMIN — INSULIN ASPART SCH UNITS: 100 INJECTION, SOLUTION INTRAVENOUS; SUBCUTANEOUS at 14:46

## 2023-07-04 RX ADMIN — INSULIN DETEMIR SCH UNIT: 100 INJECTION, SOLUTION SUBCUTANEOUS at 21:25

## 2023-07-05 LAB
ALBUMIN SERPL-MCNC: 2 G/DL (ref 3.4–5)
ALP SERPL-CCNC: 141 U/L (ref 45–117)
ALT SERPL-CCNC: 21 U/L (ref 13–61)
ANION GAP SERPL CALC-SCNC: 7 MMOL/L (ref 8–16)
ANISOCYTOSIS BLD QL: 0
AST SERPL-CCNC: 19 U/L (ref 15–37)
BASO STIPL BLD QL SMEAR: 0
BILIRUB SERPL-MCNC: 0.5 MG/DL (ref 0.2–1)
BUN SERPL-MCNC: 20.6 MG/DL (ref 7–18)
CALCIUM SERPL-MCNC: 9.4 MG/DL (ref 8.5–10.1)
CHLORIDE SERPL-SCNC: 97 MMOL/L (ref 98–107)
CO2 SERPL-SCNC: 32 MMOL/L (ref 21–32)
CREAT SERPL-MCNC: 0.7 MG/DL (ref 0.55–1.3)
DACRYOCYTES BLD QL SMEAR: 0
DEPRECATED RDW RBC AUTO: 13.3 % (ref 11.9–15.9)
DOHLE BOD BLD QL SMEAR: 0
GLUCOSE SERPL-MCNC: 423 MG/DL (ref 74–106)
HCT VFR BLD CALC: 33.1 % (ref 35.4–49)
HELMET CELLS BLD QL SMEAR: 0
HGB BLD-MCNC: 10.5 GM/DL (ref 11.7–16.9)
HOWELL-JOLLY BOD BLD QL SMEAR: 0
MACROCYTES BLD QL: 0
MAGNESIUM SERPL-MCNC: 2.6 MG/DL (ref 1.8–2.4)
MCH RBC QN AUTO: 29.3 PG (ref 25.7–33.7)
MCHC RBC AUTO-ENTMCNC: 31.8 G/DL (ref 32–35.9)
MCV RBC: 92.2 FL (ref 80–96)
OVALOCYTES BLD QL SMEAR: 0
PLATELET # BLD AUTO: 418 10^3/UL (ref 134–434)
PMV BLD: 8.9 FL (ref 7.5–11.1)
POTASSIUM SERPLBLD-SCNC: 4.6 MMOL/L (ref 3.5–5.1)
PROT SERPL-MCNC: 7.3 G/DL (ref 6.4–8.2)
RBC # BLD AUTO: 3.59 M/MM3 (ref 4–5.6)
ROULEAUX BLD QL SMEAR: 0
SICKLE CELLS BLD QL SMEAR: 0
SODIUM SERPL-SCNC: 136 MMOL/L (ref 136–145)
TARGETS BLD QL SMEAR: 0
TOXIC GRANULES BLD QL SMEAR: 0
WBC # BLD AUTO: 14.8 K/MM3 (ref 4–10)

## 2023-07-05 RX ADMIN — BUDESONIDE AND FORMOTEROL FUMARATE DIHYDRATE SCH PUFF: 160; 4.5 AEROSOL RESPIRATORY (INHALATION) at 10:17

## 2023-07-05 RX ADMIN — INSULIN ASPART SCH UNITS: 100 INJECTION, SOLUTION INTRAVENOUS; SUBCUTANEOUS at 17:43

## 2023-07-05 RX ADMIN — Medication SCH ML: at 10:16

## 2023-07-05 RX ADMIN — INSULIN ASPART SCH UNITS: 100 INJECTION, SOLUTION INTRAVENOUS; SUBCUTANEOUS at 22:21

## 2023-07-05 RX ADMIN — AZITHROMYCIN DIHYDRATE SCH MLS/HR: 500 INJECTION, POWDER, LYOPHILIZED, FOR SOLUTION INTRAVENOUS at 11:31

## 2023-07-05 RX ADMIN — CEFTRIAXONE SCH MLS/HR: 1 INJECTION, POWDER, FOR SOLUTION INTRAMUSCULAR; INTRAVENOUS at 10:16

## 2023-07-05 RX ADMIN — ENOXAPARIN SODIUM SCH MG: 40 INJECTION SUBCUTANEOUS at 10:16

## 2023-07-05 RX ADMIN — INSULIN DETEMIR SCH UNITS: 100 INJECTION, SOLUTION SUBCUTANEOUS at 22:21

## 2023-07-05 RX ADMIN — MULTIVITAMIN TABLET SCH TAB: TABLET at 10:17

## 2023-07-05 RX ADMIN — ACETAMINOPHEN PRN MG: 325 TABLET ORAL at 17:43

## 2023-07-05 RX ADMIN — INSULIN ASPART SCH UNITS: 100 INJECTION, SOLUTION INTRAVENOUS; SUBCUTANEOUS at 11:31

## 2023-07-05 RX ADMIN — ATORVASTATIN CALCIUM SCH: 40 TABLET, FILM COATED ORAL at 22:20

## 2023-07-05 RX ADMIN — INSULIN DETEMIR SCH UNIT: 100 INJECTION, SOLUTION SUBCUTANEOUS at 06:30

## 2023-07-05 RX ADMIN — INSULIN ASPART SCH UNITS: 100 INJECTION, SOLUTION INTRAVENOUS; SUBCUTANEOUS at 06:30

## 2023-07-05 RX ADMIN — METHYLPREDNISOLONE SODIUM SUCCINATE SCH: 40 INJECTION, POWDER, FOR SOLUTION INTRAMUSCULAR; INTRAVENOUS at 01:58

## 2023-07-05 RX ADMIN — METHYLPREDNISOLONE SODIUM SUCCINATE SCH MG: 40 INJECTION, POWDER, FOR SOLUTION INTRAMUSCULAR; INTRAVENOUS at 10:17

## 2023-07-06 LAB
ALBUMIN SERPL-MCNC: 1.9 G/DL (ref 3.4–5)
ALP SERPL-CCNC: 124 U/L (ref 45–117)
ALT SERPL-CCNC: 36 U/L (ref 13–61)
ANION GAP SERPL CALC-SCNC: 4 MMOL/L (ref 8–16)
ANISOCYTOSIS BLD QL: 0
AST SERPL-CCNC: 48 U/L (ref 15–37)
BASO STIPL BLD QL SMEAR: 0
BILIRUB SERPL-MCNC: 0.5 MG/DL (ref 0.2–1)
BUN SERPL-MCNC: 18.5 MG/DL (ref 7–18)
CALCIUM SERPL-MCNC: 9.5 MG/DL (ref 8.5–10.1)
CHLORIDE SERPL-SCNC: 98 MMOL/L (ref 98–107)
CO2 SERPL-SCNC: 35 MMOL/L (ref 21–32)
CREAT SERPL-MCNC: 0.5 MG/DL (ref 0.55–1.3)
DACRYOCYTES BLD QL SMEAR: 0
DEPRECATED RDW RBC AUTO: 13.9 % (ref 11.9–15.9)
DOHLE BOD BLD QL SMEAR: 0
GLUCOSE SERPL-MCNC: 201 MG/DL (ref 74–106)
HCT VFR BLD CALC: 32.8 % (ref 35.4–49)
HELMET CELLS BLD QL SMEAR: 0
HGB BLD-MCNC: 10.2 GM/DL (ref 11.7–16.9)
HOWELL-JOLLY BOD BLD QL SMEAR: 0
MACROCYTES BLD QL: 0
MAGNESIUM SERPL-MCNC: 2.4 MG/DL (ref 1.8–2.4)
MCH RBC QN AUTO: 28.6 PG (ref 25.7–33.7)
MCHC RBC AUTO-ENTMCNC: 31.2 G/DL (ref 32–35.9)
MCV RBC: 91.8 FL (ref 80–96)
OVALOCYTES BLD QL SMEAR: 0
PLATELET # BLD AUTO: 401 10^3/UL (ref 134–434)
PLATELET BLD QL SMEAR: ADEQUATE
PMV BLD: 9.4 FL (ref 7.5–11.1)
POTASSIUM SERPLBLD-SCNC: 5.3 MMOL/L (ref 3.5–5.1)
PROT SERPL-MCNC: 7 G/DL (ref 6.4–8.2)
RBC # BLD AUTO: 3.58 M/MM3 (ref 4–5.6)
ROULEAUX BLD QL SMEAR: 0
SICKLE CELLS BLD QL SMEAR: 0
SODIUM SERPL-SCNC: 136 MMOL/L (ref 136–145)
TARGETS BLD QL SMEAR: 0
TOXIC GRANULES BLD QL SMEAR: 0
WBC # BLD AUTO: 22.1 K/MM3 (ref 4–10)

## 2023-07-06 RX ADMIN — AZITHROMYCIN DIHYDRATE SCH MLS/HR: 500 INJECTION, POWDER, LYOPHILIZED, FOR SOLUTION INTRAVENOUS at 10:24

## 2023-07-06 RX ADMIN — INSULIN DETEMIR SCH UNITS: 100 INJECTION, SOLUTION SUBCUTANEOUS at 06:34

## 2023-07-06 RX ADMIN — Medication SCH ML: at 10:24

## 2023-07-06 RX ADMIN — ENOXAPARIN SODIUM SCH MG: 40 INJECTION SUBCUTANEOUS at 10:24

## 2023-07-06 RX ADMIN — BUDESONIDE AND FORMOTEROL FUMARATE DIHYDRATE SCH PUFF: 160; 4.5 AEROSOL RESPIRATORY (INHALATION) at 10:24

## 2023-07-06 RX ADMIN — CEFTRIAXONE SCH MLS/HR: 1 INJECTION, POWDER, FOR SOLUTION INTRAMUSCULAR; INTRAVENOUS at 10:24

## 2023-07-06 RX ADMIN — MULTIVITAMIN TABLET SCH TAB: TABLET at 10:24

## 2023-07-06 RX ADMIN — AZITHROMYCIN DIHYDRATE SCH: 500 INJECTION, POWDER, LYOPHILIZED, FOR SOLUTION INTRAVENOUS at 13:05

## 2023-07-06 RX ADMIN — INSULIN DETEMIR SCH UNITS: 100 INJECTION, SOLUTION SUBCUTANEOUS at 22:09

## 2023-07-06 RX ADMIN — INSULIN ASPART SCH UNITS: 100 INJECTION, SOLUTION INTRAVENOUS; SUBCUTANEOUS at 22:09

## 2023-07-06 RX ADMIN — INSULIN ASPART SCH UNITS: 100 INJECTION, SOLUTION INTRAVENOUS; SUBCUTANEOUS at 06:35

## 2023-07-06 RX ADMIN — INSULIN ASPART SCH UNITS: 100 INJECTION, SOLUTION INTRAVENOUS; SUBCUTANEOUS at 11:39

## 2023-07-06 RX ADMIN — CEFTRIAXONE SCH: 1 INJECTION, POWDER, FOR SOLUTION INTRAMUSCULAR; INTRAVENOUS at 13:05

## 2023-07-06 RX ADMIN — ATORVASTATIN CALCIUM SCH MG: 40 TABLET, FILM COATED ORAL at 22:07

## 2023-07-06 RX ADMIN — ACETAMINOPHEN PRN MG: 325 TABLET ORAL at 22:07

## 2023-07-06 RX ADMIN — ACETAMINOPHEN PRN MG: 325 TABLET ORAL at 16:34

## 2023-07-06 RX ADMIN — INSULIN ASPART SCH: 100 INJECTION, SOLUTION INTRAVENOUS; SUBCUTANEOUS at 16:45

## 2023-07-07 LAB
ALBUMIN SERPL-MCNC: 2.1 G/DL (ref 3.4–5)
ALP SERPL-CCNC: 127 U/L (ref 45–117)
ALT SERPL-CCNC: 38 U/L (ref 13–61)
ANION GAP SERPL CALC-SCNC: 9 MMOL/L (ref 8–16)
AST SERPL-CCNC: 33 U/L (ref 15–37)
BILIRUB SERPL-MCNC: 0.5 MG/DL (ref 0.2–1)
BUN SERPL-MCNC: 17.2 MG/DL (ref 7–18)
CALCIUM SERPL-MCNC: 8.9 MG/DL (ref 8.5–10.1)
CHLORIDE SERPL-SCNC: 96 MMOL/L (ref 98–107)
CO2 SERPL-SCNC: 30 MMOL/L (ref 21–32)
CREAT SERPL-MCNC: 0.5 MG/DL (ref 0.55–1.3)
GLUCOSE SERPL-MCNC: 161 MG/DL (ref 74–106)
MAGNESIUM SERPL-MCNC: 2 MG/DL (ref 1.8–2.4)
POTASSIUM SERPLBLD-SCNC: 4.9 MMOL/L (ref 3.5–5.1)
PROT SERPL-MCNC: 7.2 G/DL (ref 6.4–8.2)
SODIUM SERPL-SCNC: 135 MMOL/L (ref 136–145)

## 2023-07-07 RX ADMIN — MULTIVITAMIN TABLET SCH TAB: TABLET at 09:00

## 2023-07-07 RX ADMIN — ATORVASTATIN CALCIUM SCH MG: 40 TABLET, FILM COATED ORAL at 22:18

## 2023-07-07 RX ADMIN — INSULIN ASPART SCH: 100 INJECTION, SOLUTION INTRAVENOUS; SUBCUTANEOUS at 16:43

## 2023-07-07 RX ADMIN — BUDESONIDE AND FORMOTEROL FUMARATE DIHYDRATE SCH PUFF: 160; 4.5 AEROSOL RESPIRATORY (INHALATION) at 09:01

## 2023-07-07 RX ADMIN — INSULIN ASPART SCH UNITS: 100 INJECTION, SOLUTION INTRAVENOUS; SUBCUTANEOUS at 06:12

## 2023-07-07 RX ADMIN — INSULIN ASPART SCH UNITS: 100 INJECTION, SOLUTION INTRAVENOUS; SUBCUTANEOUS at 22:19

## 2023-07-07 RX ADMIN — INSULIN ASPART SCH: 100 INJECTION, SOLUTION INTRAVENOUS; SUBCUTANEOUS at 11:54

## 2023-07-07 RX ADMIN — INSULIN DETEMIR SCH UNITS: 100 INJECTION, SOLUTION SUBCUTANEOUS at 06:12

## 2023-07-07 RX ADMIN — ENOXAPARIN SODIUM SCH MG: 40 INJECTION SUBCUTANEOUS at 09:00

## 2023-07-07 RX ADMIN — Medication SCH ML: at 09:00

## 2023-07-07 RX ADMIN — INSULIN DETEMIR SCH UNITS: 100 INJECTION, SOLUTION SUBCUTANEOUS at 22:18

## 2023-07-08 RX ADMIN — INSULIN DETEMIR SCH UNITS: 100 INJECTION, SOLUTION SUBCUTANEOUS at 06:11

## 2023-07-08 RX ADMIN — ACETAMINOPHEN PRN MG: 160 SOLUTION ORAL at 23:23

## 2023-07-08 RX ADMIN — Medication SCH ML: at 08:51

## 2023-07-08 RX ADMIN — ACETAMINOPHEN PRN MG: 325 TABLET ORAL at 22:16

## 2023-07-08 RX ADMIN — ATORVASTATIN CALCIUM SCH MG: 40 TABLET, FILM COATED ORAL at 21:02

## 2023-07-08 RX ADMIN — INSULIN ASPART SCH UNITS: 100 INJECTION, SOLUTION INTRAVENOUS; SUBCUTANEOUS at 17:32

## 2023-07-08 RX ADMIN — IPRATROPIUM BROMIDE AND ALBUTEROL SULFATE SCH AMP: .5; 3 SOLUTION RESPIRATORY (INHALATION) at 20:37

## 2023-07-08 RX ADMIN — INSULIN ASPART SCH UNITS: 100 INJECTION, SOLUTION INTRAVENOUS; SUBCUTANEOUS at 06:12

## 2023-07-08 RX ADMIN — IPRATROPIUM BROMIDE AND ALBUTEROL SULFATE SCH AMP: .5; 3 SOLUTION RESPIRATORY (INHALATION) at 11:55

## 2023-07-08 RX ADMIN — INSULIN ASPART SCH UNITS: 100 INJECTION, SOLUTION INTRAVENOUS; SUBCUTANEOUS at 21:02

## 2023-07-08 RX ADMIN — BUDESONIDE AND FORMOTEROL FUMARATE DIHYDRATE SCH PUFF: 160; 4.5 AEROSOL RESPIRATORY (INHALATION) at 09:54

## 2023-07-08 RX ADMIN — ACETAMINOPHEN PRN MG: 325 TABLET ORAL at 12:41

## 2023-07-08 RX ADMIN — MULTIVITAMIN TABLET SCH TAB: TABLET at 09:54

## 2023-07-08 RX ADMIN — INSULIN DETEMIR SCH UNITS: 100 INJECTION, SOLUTION SUBCUTANEOUS at 21:03

## 2023-07-08 RX ADMIN — INSULIN ASPART SCH: 100 INJECTION, SOLUTION INTRAVENOUS; SUBCUTANEOUS at 11:42

## 2023-07-08 RX ADMIN — ENOXAPARIN SODIUM SCH MG: 40 INJECTION SUBCUTANEOUS at 09:54

## 2023-07-08 RX ADMIN — IPRATROPIUM BROMIDE AND ALBUTEROL SULFATE SCH: .5; 3 SOLUTION RESPIRATORY (INHALATION) at 16:35

## 2023-07-09 RX ADMIN — IPRATROPIUM BROMIDE AND ALBUTEROL SULFATE SCH AMP: .5; 3 SOLUTION RESPIRATORY (INHALATION) at 07:20

## 2023-07-09 RX ADMIN — ATORVASTATIN CALCIUM SCH: 40 TABLET, FILM COATED ORAL at 21:04

## 2023-07-09 RX ADMIN — INSULIN ASPART SCH UNITS: 100 INJECTION, SOLUTION INTRAVENOUS; SUBCUTANEOUS at 21:05

## 2023-07-09 RX ADMIN — IPRATROPIUM BROMIDE AND ALBUTEROL SULFATE SCH: .5; 3 SOLUTION RESPIRATORY (INHALATION) at 15:01

## 2023-07-09 RX ADMIN — ACETAMINOPHEN PRN MG: 160 SOLUTION ORAL at 13:06

## 2023-07-09 RX ADMIN — ACETAMINOPHEN PRN MG: 160 SOLUTION ORAL at 06:57

## 2023-07-09 RX ADMIN — BUDESONIDE AND FORMOTEROL FUMARATE DIHYDRATE SCH PUFF: 160; 4.5 AEROSOL RESPIRATORY (INHALATION) at 10:09

## 2023-07-09 RX ADMIN — MULTIVITAMIN TABLET SCH TAB: TABLET at 10:08

## 2023-07-09 RX ADMIN — ENOXAPARIN SODIUM SCH MG: 40 INJECTION SUBCUTANEOUS at 10:08

## 2023-07-09 RX ADMIN — INSULIN DETEMIR SCH UNITS: 100 INJECTION, SOLUTION SUBCUTANEOUS at 21:04

## 2023-07-09 RX ADMIN — INSULIN ASPART SCH UNITS: 100 INJECTION, SOLUTION INTRAVENOUS; SUBCUTANEOUS at 17:07

## 2023-07-09 RX ADMIN — INSULIN ASPART SCH UNITS: 100 INJECTION, SOLUTION INTRAVENOUS; SUBCUTANEOUS at 11:55

## 2023-07-09 RX ADMIN — INSULIN ASPART SCH: 100 INJECTION, SOLUTION INTRAVENOUS; SUBCUTANEOUS at 06:28

## 2023-07-09 RX ADMIN — IPRATROPIUM BROMIDE AND ALBUTEROL SULFATE SCH AMP: .5; 3 SOLUTION RESPIRATORY (INHALATION) at 11:19

## 2023-07-09 RX ADMIN — Medication SCH ML: at 07:56

## 2023-07-09 RX ADMIN — PREDNISONE SCH MG: 20 TABLET ORAL at 10:08

## 2023-07-09 RX ADMIN — IPRATROPIUM BROMIDE AND ALBUTEROL SULFATE SCH: .5; 3 SOLUTION RESPIRATORY (INHALATION) at 20:14

## 2023-07-10 RX ADMIN — BUDESONIDE AND FORMOTEROL FUMARATE DIHYDRATE SCH PUFF: 160; 4.5 AEROSOL RESPIRATORY (INHALATION) at 10:35

## 2023-07-10 RX ADMIN — IPRATROPIUM BROMIDE AND ALBUTEROL SULFATE SCH: .5; 3 SOLUTION RESPIRATORY (INHALATION) at 12:55

## 2023-07-10 RX ADMIN — MULTIVITAMIN TABLET SCH TAB: TABLET at 09:37

## 2023-07-10 RX ADMIN — INSULIN ASPART SCH UNITS: 100 INJECTION, SOLUTION INTRAVENOUS; SUBCUTANEOUS at 21:00

## 2023-07-10 RX ADMIN — ENOXAPARIN SODIUM SCH MG: 40 INJECTION SUBCUTANEOUS at 09:37

## 2023-07-10 RX ADMIN — INSULIN ASPART SCH UNITS: 100 INJECTION, SOLUTION INTRAVENOUS; SUBCUTANEOUS at 11:21

## 2023-07-10 RX ADMIN — ATORVASTATIN CALCIUM SCH MG: 40 TABLET, FILM COATED ORAL at 21:01

## 2023-07-10 RX ADMIN — IPRATROPIUM BROMIDE AND ALBUTEROL SULFATE SCH AMP: .5; 3 SOLUTION RESPIRATORY (INHALATION) at 16:35

## 2023-07-10 RX ADMIN — INSULIN DETEMIR SCH UNITS: 100 INJECTION, SOLUTION SUBCUTANEOUS at 21:00

## 2023-07-10 RX ADMIN — IPRATROPIUM BROMIDE AND ALBUTEROL SULFATE SCH: .5; 3 SOLUTION RESPIRATORY (INHALATION) at 20:47

## 2023-07-10 RX ADMIN — PREDNISONE SCH MG: 20 TABLET ORAL at 09:37

## 2023-07-10 RX ADMIN — Medication SCH ML: at 09:37

## 2023-07-10 RX ADMIN — IPRATROPIUM BROMIDE AND ALBUTEROL SULFATE SCH AMP: .5; 3 SOLUTION RESPIRATORY (INHALATION) at 08:56

## 2023-07-10 RX ADMIN — INSULIN ASPART SCH UNITS: 100 INJECTION, SOLUTION INTRAVENOUS; SUBCUTANEOUS at 06:37

## 2023-07-10 RX ADMIN — INSULIN ASPART SCH UNITS: 100 INJECTION, SOLUTION INTRAVENOUS; SUBCUTANEOUS at 16:47

## 2023-07-11 LAB
ALBUMIN SERPL-MCNC: 2.1 G/DL (ref 3.4–5)
ALP SERPL-CCNC: 160 U/L (ref 45–117)
ALT SERPL-CCNC: 33 U/L (ref 13–61)
ANION GAP SERPL CALC-SCNC: 3 MMOL/L (ref 8–16)
AST SERPL-CCNC: 23 U/L (ref 15–37)
BASOPHILS # BLD: 0.3 % (ref 0–2)
BILIRUB SERPL-MCNC: 0.4 MG/DL (ref 0.2–1)
BUN SERPL-MCNC: 15.2 MG/DL (ref 7–18)
CALCIUM SERPL-MCNC: 9 MG/DL (ref 8.5–10.1)
CHLORIDE SERPL-SCNC: 100 MMOL/L (ref 98–107)
CO2 SERPL-SCNC: 36 MMOL/L (ref 21–32)
CREAT SERPL-MCNC: 0.5 MG/DL (ref 0.55–1.3)
DEPRECATED RDW RBC AUTO: 14.1 % (ref 11.9–15.9)
EOSINOPHIL # BLD: 0.6 % (ref 0–4.5)
GLUCOSE SERPL-MCNC: 42 MG/DL (ref 74–106)
HCT VFR BLD CALC: 30.5 % (ref 35.4–49)
HGB BLD-MCNC: 9.8 GM/DL (ref 11.7–16.9)
LYMPHOCYTES # BLD: 7.9 % (ref 8–40)
MAGNESIUM SERPL-MCNC: 2.1 MG/DL (ref 1.8–2.4)
MCH RBC QN AUTO: 29.1 PG (ref 25.7–33.7)
MCHC RBC AUTO-ENTMCNC: 32.2 G/DL (ref 32–35.9)
MCV RBC: 90.3 FL (ref 80–96)
MONOCYTES # BLD AUTO: 10.9 % (ref 3.8–10.2)
NEUTROPHILS # BLD: 80.3 % (ref 42.8–82.8)
PLATELET # BLD AUTO: 657 10^3/UL (ref 134–434)
PMV BLD: 7.7 FL (ref 7.5–11.1)
POTASSIUM SERPLBLD-SCNC: 4.2 MMOL/L (ref 3.5–5.1)
PROT SERPL-MCNC: 7.3 G/DL (ref 6.4–8.2)
RBC # BLD AUTO: 3.38 M/MM3 (ref 4–5.6)
SODIUM SERPL-SCNC: 138 MMOL/L (ref 136–145)
WBC # BLD AUTO: 14.2 K/MM3 (ref 4–10)

## 2023-07-11 RX ADMIN — IPRATROPIUM BROMIDE AND ALBUTEROL SULFATE SCH AMP: .5; 3 SOLUTION RESPIRATORY (INHALATION) at 16:53

## 2023-07-11 RX ADMIN — PREDNISONE SCH MG: 20 TABLET ORAL at 09:45

## 2023-07-11 RX ADMIN — INSULIN ASPART SCH UNITS: 100 INJECTION, SOLUTION INTRAVENOUS; SUBCUTANEOUS at 16:50

## 2023-07-11 RX ADMIN — MULTIVITAMIN TABLET SCH TAB: TABLET at 09:45

## 2023-07-11 RX ADMIN — ENOXAPARIN SODIUM SCH MG: 40 INJECTION SUBCUTANEOUS at 09:45

## 2023-07-11 RX ADMIN — IPRATROPIUM BROMIDE AND ALBUTEROL SULFATE SCH: .5; 3 SOLUTION RESPIRATORY (INHALATION) at 16:52

## 2023-07-11 RX ADMIN — Medication SCH: at 09:45

## 2023-07-11 RX ADMIN — INSULIN ASPART SCH UNITS: 100 INJECTION, SOLUTION INTRAVENOUS; SUBCUTANEOUS at 06:06

## 2023-07-11 RX ADMIN — INSULIN ASPART SCH UNITS: 100 INJECTION, SOLUTION INTRAVENOUS; SUBCUTANEOUS at 21:17

## 2023-07-11 RX ADMIN — INSULIN ASPART SCH: 100 INJECTION, SOLUTION INTRAVENOUS; SUBCUTANEOUS at 11:59

## 2023-07-11 RX ADMIN — IPRATROPIUM BROMIDE AND ALBUTEROL SULFATE SCH: .5; 3 SOLUTION RESPIRATORY (INHALATION) at 08:57

## 2023-07-11 RX ADMIN — ATORVASTATIN CALCIUM SCH MG: 40 TABLET, FILM COATED ORAL at 21:18

## 2023-07-11 RX ADMIN — IPRATROPIUM BROMIDE AND ALBUTEROL SULFATE SCH AMP: .5; 3 SOLUTION RESPIRATORY (INHALATION) at 20:19

## 2023-07-11 RX ADMIN — BUDESONIDE AND FORMOTEROL FUMARATE DIHYDRATE SCH PUFF: 160; 4.5 AEROSOL RESPIRATORY (INHALATION) at 11:06

## 2023-07-11 RX ADMIN — INSULIN DETEMIR SCH UNITS: 100 INJECTION, SOLUTION SUBCUTANEOUS at 21:17

## 2023-07-12 RX ADMIN — ENOXAPARIN SODIUM SCH MG: 40 INJECTION SUBCUTANEOUS at 09:22

## 2023-07-12 RX ADMIN — MULTIVITAMIN TABLET SCH TAB: TABLET at 09:22

## 2023-07-12 RX ADMIN — INSULIN ASPART SCH UNITS: 100 INJECTION, SOLUTION INTRAVENOUS; SUBCUTANEOUS at 06:00

## 2023-07-12 RX ADMIN — IPRATROPIUM BROMIDE AND ALBUTEROL SULFATE SCH AMP: .5; 3 SOLUTION RESPIRATORY (INHALATION) at 07:45

## 2023-07-12 RX ADMIN — IPRATROPIUM BROMIDE AND ALBUTEROL SULFATE SCH: .5; 3 SOLUTION RESPIRATORY (INHALATION) at 11:30

## 2023-07-12 RX ADMIN — INSULIN ASPART SCH UNITS: 100 INJECTION, SOLUTION INTRAVENOUS; SUBCUTANEOUS at 11:30

## 2023-07-12 RX ADMIN — INSULIN ASPART SCH UNITS: 100 INJECTION, SOLUTION INTRAVENOUS; SUBCUTANEOUS at 23:09

## 2023-07-12 RX ADMIN — INSULIN ASPART SCH: 100 INJECTION, SOLUTION INTRAVENOUS; SUBCUTANEOUS at 16:31

## 2023-07-12 RX ADMIN — BUDESONIDE AND FORMOTEROL FUMARATE DIHYDRATE SCH PUFF: 160; 4.5 AEROSOL RESPIRATORY (INHALATION) at 09:23

## 2023-07-12 RX ADMIN — Medication SCH: at 09:22

## 2023-07-12 RX ADMIN — BUDESONIDE AND FORMOTEROL FUMARATE DIHYDRATE SCH PUFF: 160; 4.5 AEROSOL RESPIRATORY (INHALATION) at 23:14

## 2023-07-13 VITALS — HEART RATE: 126 BPM | DIASTOLIC BLOOD PRESSURE: 50 MMHG | TEMPERATURE: 99 F | SYSTOLIC BLOOD PRESSURE: 104 MMHG

## 2023-07-13 VITALS — RESPIRATION RATE: 22 BRPM

## 2023-07-13 RX ADMIN — INSULIN ASPART SCH: 100 INJECTION, SOLUTION INTRAVENOUS; SUBCUTANEOUS at 07:10

## 2023-07-13 RX ADMIN — BUDESONIDE AND FORMOTEROL FUMARATE DIHYDRATE SCH PUFF: 160; 4.5 AEROSOL RESPIRATORY (INHALATION) at 11:08

## 2023-07-13 RX ADMIN — INSULIN ASPART SCH UNITS: 100 INJECTION, SOLUTION INTRAVENOUS; SUBCUTANEOUS at 16:21

## 2023-07-13 RX ADMIN — INSULIN ASPART SCH UNITS: 100 INJECTION, SOLUTION INTRAVENOUS; SUBCUTANEOUS at 11:45
